# Patient Record
Sex: MALE | Race: WHITE | NOT HISPANIC OR LATINO | Employment: OTHER | URBAN - METROPOLITAN AREA
[De-identification: names, ages, dates, MRNs, and addresses within clinical notes are randomized per-mention and may not be internally consistent; named-entity substitution may affect disease eponyms.]

---

## 2018-04-26 ENCOUNTER — HOSPITAL ENCOUNTER (EMERGENCY)
Facility: HOSPITAL | Age: 63
Discharge: HOME | End: 2018-04-26
Attending: EMERGENCY MEDICINE | Admitting: EMERGENCY MEDICINE
Payer: COMMERCIAL

## 2018-04-26 ENCOUNTER — APPOINTMENT (EMERGENCY)
Dept: RADIOLOGY | Facility: HOSPITAL | Age: 63
End: 2018-04-26
Payer: COMMERCIAL

## 2018-04-26 VITALS
SYSTOLIC BLOOD PRESSURE: 124 MMHG | DIASTOLIC BLOOD PRESSURE: 67 MMHG | BODY MASS INDEX: 21.81 KG/M2 | TEMPERATURE: 98.4 F | WEIGHT: 152.34 LBS | OXYGEN SATURATION: 95 % | RESPIRATION RATE: 17 BRPM | HEIGHT: 70 IN | HEART RATE: 77 BPM

## 2018-04-26 DIAGNOSIS — S52.509A DISTAL RADIAL FRACTURE: ICD-10-CM

## 2018-04-26 DIAGNOSIS — M25.532 LEFT WRIST PAIN: Primary | ICD-10-CM

## 2018-04-26 PROCEDURE — 99283 EMERGENCY DEPT VISIT LOW MDM: CPT

## 2018-04-26 PROCEDURE — 73110 X-RAY EXAM OF WRIST: CPT

## 2018-04-26 RX ORDER — ACETAMINOPHEN 325 MG/1
975 TABLET ORAL ONCE
Status: DISCONTINUED | OUTPATIENT
Start: 2018-04-26 | End: 2018-04-26

## 2018-04-26 RX ORDER — NAPROXEN 500 MG/1
500 TABLET ORAL 2 TIMES DAILY PRN
Qty: 20 TABLET | Refills: 0 | Status: SHIPPED | OUTPATIENT
Start: 2018-04-26

## 2018-04-26 NOTE — DISCHARGE INSTRUCTIONS
Wrist Fracture in Adults   WHAT YOU NEED TO KNOW:   A wrist fracture is a break in one or more of the bones in your wrist    DISCHARGE INSTRUCTIONS:   Return to the emergency department if:   · Your pain gets worse or does not get better after you take pain medicine  · Your cast or splint breaks, gets wet, or is damaged  · Your hand or fingers feel numb or cold  · Your hand or fingers turn white or blue  · Your splint or cast feels too tight  · You have more pain or swelling after the cast or splint is put on  Contact your healthcare provider if:   · You have a fever  · There is a foul smell or blood coming from under the cast     · You have questions or concerns about your condition or care  Medicines:   · Prescription pain medicine  may be given  Ask your healthcare provider how to take this medicine safely  Some prescription pain medicines contain acetaminophen  Do not take other medicines that contain acetaminophen without talking to your healthcare provider  Too much acetaminophen may cause liver damage  Prescription pain medicine may cause constipation  Ask your healthcare provider how to prevent or treat constipation  · NSAIDs , such as ibuprofen, help decrease swelling, pain, and fever  NSAIDs can cause stomach bleeding or kidney problems in certain people  If you take blood thinner medicine, always ask your healthcare provider if NSAIDs are safe for you  Always read the medicine label and follow directions  · Acetaminophen  decreases pain and fever  It is available without a doctor's order  Ask how much to take and how often to take it  Follow directions  Read the labels of all other medicines you are using to see if they also contain acetaminophen, or ask your doctor or pharmacist  Acetaminophen can cause liver damage if not taken correctly  Do not use more than 4 grams (4,000 milligrams) total of acetaminophen in one day  · Take your medicine as directed    Contact your healthcare provider if you think your medicine is not helping or if you have side effects  Tell him or her if you are allergic to any medicine  Keep a list of the medicines, vitamins, and herbs you take  Include the amounts, and when and why you take them  Bring the list or the pill bottles to follow-up visits  Carry your medicine list with you in case of an emergency  Self-care:   · Rest  as much as possible  Do not play contact sports until the healthcare provider says it is okay  · Apply ice  on your wrist for 15 to 20 minutes every hour or as directed  Use an ice pack, or put crushed ice in a plastic bag  Cover it with a towel before you place it on your skin  Ice helps prevent tissue damage and decreases swelling and pain  · Elevate  your wrist above the level of your heart as often as possible  This will help decrease swelling and pain  Prop your wrist on pillows or blankets to keep it elevated comfortably  Cast or splint care:   · You may take a bath or shower as directed  Do not let your cast or splint get wet  Before bathing, cover the cast or splint with 2 plastic trash bags  Tape the bags to your skin above the cast or splint to seal out the water  Keep your arm out of the water in case the bag breaks  If a plaster cast gets wet and soft, call your healthcare provider  · Check the skin around the cast or splint every day  You may put lotion on any red or sore areas  · Do not push down or lean on the cast or brace because it may break  · Do not  scratch the skin under the cast by putting a sharp or pointed object inside the cast   Go to physical therapy as directed: You may need physical therapy after your wrist heals and the cast is removed  A physical therapist can teach you exercises to help improve movement and strength and to decrease pain  Follow up with your healthcare provider or bone specialist as directed: You may need to return to have your cast removed   You may also need an x-ray to check how well the bone has healed  Write down your questions so you remember to ask them during your visits  © 2017 [address] [employer] Information is for End User's use only and may not be sold, redistributed or otherwise used for commercial purposes  All illustrations and images included in CareNotes® are the copyrighted property of A D [employer] M [employer]  or [employer]  The above information is an  only  It is not intended as medical advice for individual conditions or treatments  Talk to your doctor, nurse or pharmacist before following any medical regimen to see if it is safe and effective for you  Splint Care   WHAT YOU NEED TO KNOW:   Splint care is important to help protect your splint until it comes off  Some splints are made of fiberglass or plaster that will need to dry and harden  Splint care will help the splint dry and harden correctly  Even after your splint hardens, it can be damaged  DISCHARGE INSTRUCTIONS:   Return to the emergency department if:   · You have increased pain  · Your fingers or toes are numb or tingling  · You feel burning or stinging around your injury  · Your nails, fingers, or toes turn pale, blue, or gray, and feel cold  · You have new or increased trouble moving your fingers or toes  · Your swelling gets worse  · The skin under your splint is bleeding or leaking pus  Contact your healthcare provider if:   · Your hard splint gets wet or is damaged  · You have a fever  · Your splint feels tighter  · You have itchy, dry skin under your splint that is getting worse  · The skin under your splint is red, or you have a new sore  · You notice a bad smell coming from your splint  · You have questions or concerns about your condition or care  How to care for your splint:   · Wait for your hard splint to harden completely    You may have to wait up to 3 days before you can walk on a plaster splint  · Check your splint and the skin around it each day  Check your splint for damage, such as cracks and breaks  Check your skin for redness, increased swelling, and sores  Loosen the elastic bandage around your splint if it feels too tight  · Keep your splint clean and dry  Keep dirt out of your splint  Before you bathe, wrap your hard splint with 2 layers of plastic  Then put a plastic bag over it  Keep the plastic bag tightly sealed  You can also ask your healthcare provider about waterproof shields  Do not put your hard splint in the water , even with a plastic bag over it  A wet splint can make your skin itchy, and may lead to infection  · Do not put powders or deodorants inside your splint  These can dry your skin and increase itching  · Do not try to scratch the skin inside your hard splint with sharp objects  Sharp objects can break off inside your splint or hurt your skin  · Do not pull the padding out of your splint  The padding inside your splint protects your skin  You may develop a sore on your skin if you take out the padding  Follow up with your healthcare provider as directed within 1 to 2 weeks:  Write down your questions so you remember to ask them during your visits  © 2017 [address] [employer] Information is for End User's use only and may not be sold, redistributed or otherwise used for commercial purposes  All illustrations and images included in CareNotes® are the copyrighted property of A D [employer] M [employer] Inc  or [employer] Jones  The above information is an  only  It is not intended as medical advice for individual conditions or treatments  Talk to your doctor, nurse or pharmacist before following any medical regimen to see if it is safe and effective for you

## 2018-04-26 NOTE — ED PROVIDER NOTES
History  Chief Complaint   Patient presents with    Wrist Injury     Pt presents to ED after injuring wrist after dog pulled while on leash  Some swelling noted  History of Present Illness   58 y o  male presents to the ED after his dog pulled on the leash, injuring his left wrist   Patient notes lateral pain at the base of the 1st digit  Patient denies striking his head and denies any loss of consciousness  Patient states the injury occurred yesterday (about 30 hours ago)  Patient currently complains of only left wrist pain  ROS: Patient denies any headache, abdominal pain, chest pain, other extremity pain, fever/chils, nausea/vomiting, visual changes, diarrhea, dyspnea, cough, arthralgia, dysuria  All other systems reviewed and are negative  PHYSICAL EXAM:   Constitutional: No acute distress  HENT: Normocephalic and atraumatic  Normal pharyngeal exam  No raccoon eyes or Abad sign  Eyes: No hyphema  PERRL  Neck: No midline tenderness, supple  CV: Regular rate  Respiratory: Normal effort  Abdomen: non-distended  Back: No vertebral tenderness  Skin: Normal color, warm and dry   Extremities (other than left wrist): Non-tender, no deformities  Left wrist:   Contusion noted at the posterior aspect of the 1st digit  There is tenderness to palpation in this area  Neuro: Awake, alert, no gross sensory or motor deficits     Medical Decision Making     Left wrist pain with a broad differential   Considering patient's potential traumatic injury, will obtain plain film imaging to evaluate for potential fracture  Patient has declined analgesia, he took naproxen prior to coming to the emergency room and states that his pain has substantially improved since then  I discussed continued analgesia with the naproxen and following with Orthopedics        X-ray imaging was concerning for potential dislocation of the ulna however I discussed with Radiology feels more likely the angle of film as it is only in 1 view  Concerns for potential nondisplaced distal radius fracture  Patient placed in volar splint by technician, post check neurovascular exam is  Intact as completed by myself  I discussed again follow-up with Orthopedics, splint care, symptomatic management and return precautions  Wrist Injury - Major   Location:  Wrist  Wrist location:  L wrist  Injury: yes    Time since incident:  1 day  Pain details:     Radiates to:  Does not radiate    Severity:  Moderate    Onset quality:  Gradual    Duration:  1 day    Timing:  Constant    Progression:  Worsening  Handedness:  Right-handed  Prior injury to area:  Yes  Relieved by:  NSAIDs  Worsened by:  Nothing  Ineffective treatments:  None tried  Associated symptoms: decreased range of motion    Associated symptoms: no back pain, no fatigue, no fever, no muscle weakness, no neck pain, no numbness, no stiffness, no swelling and no tingling        None       History reviewed  No pertinent past medical history  History reviewed  No pertinent surgical history  History reviewed  No pertinent family history  I have reviewed and agree with the history as documented  Social History   Substance Use Topics    Smoking status: Current Every Day Smoker     Packs/day: 1 00    Smokeless tobacco: Never Used    Alcohol use No        Review of Systems   Constitutional: Negative for fatigue and fever  Musculoskeletal: Negative for back pain, neck pain and stiffness  All other systems reviewed and are negative        Physical Exam  ED Triage Vitals [04/26/18 0223]   Temperature Pulse Respirations Blood Pressure SpO2   98 4 °F (36 9 °C) 77 17 124/67 95 %      Temp Source Heart Rate Source Patient Position - Orthostatic VS BP Location FiO2 (%)   Oral Monitor Sitting Right arm --      Pain Score       5           Orthostatic Vital Signs  Vitals:    04/26/18 0223   BP: 124/67   Pulse: 77   Patient Position - Orthostatic VS: Sitting       Physical Exam    ED Medications  Medications - No data to display    Diagnostic Studies  Results Reviewed     None                 XR wrist 3+ views LEFT   ED Interpretation by Nickie Moreau MD (04/26 3723)   Abnormal   Isolated dislocation of DRUJ? Final Result by Helena Weeks DO (04/26 9875)      Questionable nondisplaced fracture of the distal radius  Workstation performed: [id number]                    Procedures  Procedures       Phone Contacts  ED Phone Contact    ED Course                               MDM  CritCare Time    Disposition  Final diagnoses:   Left wrist pain   Distal radial fracture     Time reflects when diagnosis was documented in both MDM as applicable and the Disposition within this note     Time User Action Codes Description Comment    4/26/2018  3:41 AM Ino Maxon Add [M25 532] Left wrist pain     4/26/2018  3:41 AM Ino Maxon Add [L66 028L] Distal radial fracture     4/26/2018  3:41 AM Ino Maxon Remove [U33 601G] Distal radial fracture     4/26/2018  3:42 AM Ino Maxon Add [K19 188J] Distal radial fracture       ED Disposition     ED Disposition Condition Comment    Discharge  Jose Mendoza discharge to home/self care  Condition at discharge: Stable        Follow-up Information     Follow up With Specialties Details Why [address] Orthopedic Surgery Schedule an appointment as soon as possible for a visit in 1 week   Follow-up on left wrist fracture  [address] [facility] [address]  [phone]        Patient's Medications   Discharge Prescriptions    NAPROXEN (NAPROSYN) 500 MG TABLET    Take 1 tablet (500 mg total) by mouth 2 (two) times a day as needed for mild pain (take with food) for up to 20 doses       Start Date: 4/26/2018 End Date: --       Order Dose: 500 mg       Quantity: 20 tablet    Refills: 0     No discharge procedures on file      ED Provider  Electronically Signed by           Tiki Connors MD  04/26/18 0777

## 2018-05-01 NOTE — TELEPHONE ENCOUNTER
I called patient to schedule appt for L wrist fx and he stated that he was good he no longer needed an appt

## 2018-05-27 ENCOUNTER — APPOINTMENT (EMERGENCY)
Dept: RADIOLOGY | Facility: HOSPITAL | Age: 63
End: 2018-05-27
Payer: COMMERCIAL

## 2018-05-27 ENCOUNTER — HOSPITAL ENCOUNTER (EMERGENCY)
Facility: HOSPITAL | Age: 63
Discharge: HOME | End: 2018-05-27
Payer: COMMERCIAL

## 2018-05-27 VITALS
DIASTOLIC BLOOD PRESSURE: 74 MMHG | TEMPERATURE: 98.3 F | WEIGHT: 165 LBS | HEART RATE: 70 BPM | OXYGEN SATURATION: 99 % | HEIGHT: 71 IN | BODY MASS INDEX: 23.1 KG/M2 | SYSTOLIC BLOOD PRESSURE: 146 MMHG | RESPIRATION RATE: 20 BRPM

## 2018-05-27 DIAGNOSIS — S52.209A ULNAR FRACTURE: ICD-10-CM

## 2018-05-27 DIAGNOSIS — S41.151A DOG BITE OF RIGHT ARM, INITIAL ENCOUNTER: Primary | ICD-10-CM
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
## 2018-05-27 PROCEDURE — 90471 IMMUNIZATION ADMIN: CPT

## 2018-05-27 PROCEDURE — 73110 X-RAY EXAM OF WRIST: CPT

## 2018-05-27 PROCEDURE — 99283 EMERGENCY DEPT VISIT LOW MDM: CPT

## 2018-05-27 PROCEDURE — 90715 TDAP VACCINE 7 YRS/> IM: CPT | Performed by: PHYSICIAN ASSISTANT

## 2018-05-27 PROCEDURE — 96365 THER/PROPH/DIAG IV INF INIT: CPT

## 2018-05-27 RX ORDER — OXYCODONE HYDROCHLORIDE 5 MG/1
5 TABLET ORAL EVERY 4 HOURS PRN
Qty: 5 TABLET | Refills: 0 | Status: SHIPPED | OUTPATIENT
Start: 2018-05-27

## 2018-05-27 RX ORDER — AMOXICILLIN AND CLAVULANATE POTASSIUM 875; 125 MG/1; MG/1
1 TABLET, FILM COATED ORAL EVERY 12 HOURS SCHEDULED
Qty: 14 TABLET | Refills: 0 | Status: SHIPPED | OUTPATIENT
Start: 2018-05-27 | End: 2018-06-03

## 2018-05-27 RX ADMIN — CEFAZOLIN SODIUM 2000 MG: 2 SOLUTION INTRAVENOUS at 16:29

## 2018-05-27 RX ADMIN — TETANUS TOXOID, REDUCED DIPHTHERIA TOXOID AND ACELLULAR PERTUSSIS VACCINE, ADSORBED 0.5 ML: 5; 2.5; 8; 8; 2.5 SUSPENSION INTRAMUSCULAR at 14:49

## 2018-05-27 NOTE — ED PROVIDER NOTES
History  Chief Complaint   Patient presents with    Dog Bite     pt presents with a dog bite on right arm  Rajani Alicia is a 58 y o  male w PMH none significant who presents for evaluation of dog bite  Patient presents with his wife  He was bit by a dog today  Bit on the right arm  It was the patient's and off you recently underwent procedure at the vet  The patient's wife is trying to dress the wound and the patient was trying to assist with this when the dog got scared, was in pain with the wound care and subsequently but the patient  His tetanus shot is not up-to-date  He has no numbness or paresthesias of the arm  He noticed swelling to the under aspect of the upper arm  No AC / AP  Prior to Admission Medications   Prescriptions Last Dose Informant Patient Reported? Taking?   naproxen (NAPROSYN) 500 mg tablet   No No   Sig: Take 1 tablet (500 mg total) by mouth 2 (two) times a day as needed for mild pain (take with food) for up to 20 doses      Facility-Administered Medications: None       History reviewed  No pertinent past medical history  History reviewed  No pertinent surgical history  History reviewed  No pertinent family history  I have reviewed and agree with the history as documented  Social History   Substance Use Topics    Smoking status: Current Every Day Smoker     Packs/day: 1 00    Smokeless tobacco: Never Used    Alcohol use No      Comment: occ        Review of Systems   Constitutional: Negative for activity change, chills, diaphoresis, fatigue and fever  HENT: Negative for congestion and rhinorrhea  Eyes: Negative for pain  Respiratory: Negative for cough, chest tightness, shortness of breath and wheezing  Cardiovascular: Negative for chest pain and palpitations  Gastrointestinal: Negative for abdominal distention, constipation, diarrhea, nausea and vomiting  Genitourinary: Negative for difficulty urinating and dysuria  Musculoskeletal: Negative for arthralgias and myalgias  Skin: Positive for wound  Neurological: Negative for dizziness, weakness, light-headedness and headaches  Psychiatric/Behavioral: The patient is not nervous/anxious  Physical Exam  Physical Exam   Constitutional: He is oriented to person, place, and time  He appears well-developed and well-nourished  No distress  HENT:   Head: Normocephalic and atraumatic  Eyes: Pupils are equal, round, and reactive to light  Neck: Neck supple  No tracheal deviation present  Cardiovascular: Normal rate, regular rhythm and intact distal pulses  Exam reveals no gallop and no friction rub  No murmur heard  Pulmonary/Chest: Effort normal and breath sounds normal  No respiratory distress  He has no wheezes  He has no rales  Abdominal: Soft  Bowel sounds are normal  He exhibits no distension and no mass  There is no tenderness  There is no guarding  Musculoskeletal: He exhibits no edema or deformity  Neurological: He is alert and oriented to person, place, and time  Skin: Skin is warm and dry  He is not diaphoretic  Multiple punctate puncture wounds to the right arm  There is an area of hematoma to the right upper arm but the compartments are soft  Distal motor nerve function and motor sensation is intact and strength is 5/5 with normal cap refill in distal pulses  Only slight oozing from wounds  Psychiatric: He has a normal mood and affect  His behavior is normal    Nursing note and vitals reviewed        Vital Signs  ED Triage Vitals [05/27/18 1338]   Temperature Pulse Respirations Blood Pressure SpO2   98 3 °F (36 8 °C) 70 20 146/74 99 %      Temp Source Heart Rate Source Patient Position - Orthostatic VS BP Location FiO2 (%)   Oral Monitor Sitting Left arm --      Pain Score       6           Vitals:    05/27/18 1338   BP: 146/74   Pulse: 70   Patient Position - Orthostatic VS: Sitting       Visual Acuity      ED Medications  Medications ceFAZolin (ANCEF) IVPB (premix) 2,000 mg (not administered)   tetanus-diphtheria-acellular pertussis (BOOSTRIX) IM injection 0 5 mL (0 5 mL Intramuscular Given 5/27/18 1449)       Diagnostic Studies  Results Reviewed     None                 XR wrist 3+ views RIGHT   Final Result by Audie Freeman MD (05/27 8323)      Ulnar fracture  Puncture wounds  Soft tissue swelling            Workstation performed: [id number]                    Procedures  Procedures       Phone Contacts  ED Phone Contact    ED Course  ED Course as of May 27 1626   Sun May 27, 2018   1624 Ulnar gutter splint was placed by ED tech Sarah Rack  Examined by me post splint placement  Splint is in good position and neurovascular exam intact after splint placement  1625 After fx detected on xr did give dose of ancef IV here  MDM  Number of Diagnoses or Management Options  Dog bite of right arm, initial encounter:   Ulnar fracture:   Diagnosis management comments: DDX includes but not ltd to:   Dog bite   All punctate lacs, no lacerations which necessitate suture repair   Does not need rabies series - can monitor dog throughout the week but it seems 2/2 to dogs anxiety w wound care    Plan is to obtain:  XR of affected joint(s) for acute osseous abnormality   Animal bite paperwork  Tetanus   Wound cleansing  augmentin     Based on results:  fx of distal ulna  Cleaned wounds copiously w normal sterile saline and hydrogen peroxide  The pucnture wounds were bandaged w surgifoam for some continued oozing and then vaseline gauze, kerlix and splint placed over this  Pain controlled here and offered meds but denied need for them  Return parameters discussed  Pt requires f/u as an outpt  Pt expresses understanding w above treatment plan  All questions answered prior to d/c      Portions of the record may have been created with voice recognition software   Occasional wrong word or "sound a like" substitutions may have occurred due to the inherent limitations of voice recognition software   Read the chart carefully and recognize, using context, where substitutions have occurred  Return parameters provided regarding infecetion / compartment syndrome development        CritCare Time    Disposition  Final diagnoses:   Dog bite of right arm, initial encounter   Ulnar fracture     Time reflects when diagnosis was documented in both MDM as applicable and the Disposition within this note     Time User Action Codes Description Comment    5/27/2018  2:29 PM Genevie Angel Luis Add [employer]  0XXA] Dog bite of right arm, initial encounter     5/27/2018  4:18 PM Genevie Angel Luis Add [S52 209A] Ulnar fracture       ED Disposition     ED Disposition Condition Comment    Discharge  Doherty Mersmith discharge to home/self care      Condition at discharge: Good        Follow-up Information     Follow up With Specialties Details Why Contact Info Additional Information    [address] [facility] Emergency Medicine  If symptoms worsen [address] [facility]  [phone] [place] ED, [address] [place] [address], [address] [facility] Orthopedic Surgery Call in 1 day  [address] [place] [address] Khadijah Baltazar [address]  [phone]           Patient's Medications   Discharge Prescriptions    AMOXICILLIN-CLAVULANATE (AUGMENTIN) 875-125 MG PER TABLET    Take 1 tablet by mouth every 12 (twelve) hours for 7 days       Start Date: 5/27/2018 End Date: 6/3/2018       Order Dose: 1 tablet       Quantity: 14 tablet    Refills: 0    OXYCODONE (ROXICODONE) 5 MG IMMEDIATE RELEASE TABLET    Take 1 tablet (5 mg total) by mouth every 4 (four) hours as needed for moderate pain for up to 5 doses Max Daily Amount: 30 mg       Start Date: 5/27/2018 End Date: --       Order Dose: 5 mg       Quantity: 5 tablet    Refills: 0     No discharge procedures on file      ED Provider  Electronically Signed by           Sridhar Alanis PA-C  05/27/18 3826

## 2018-05-27 NOTE — DISCHARGE INSTRUCTIONS
Animal Bite   WHAT YOU NEED TO KNOW:   Animal bite injuries range from shallow cuts to deep, life-threatening wounds  An animal can cut or puncture the skin when it bites  Your skin may be torn from your body  Your skin may swell or bruise even if the bite does not break the skin  Animal bites occur more often on the hands, arms, legs, and face  Bites from dogs and cats are the most common injuries  DISCHARGE INSTRUCTIONS:   Return to the emergency department if:   · You have a fever  · Your wound is red, swollen, and draining pus  · You see red streaks on the skin around the wound  · You can no longer move the bitten area  · Your heartbeat and breathing are much faster than usual     · You feel dizzy and confused  Contact your healthcare provider if:   · Your pain does not get better, even after you take pain medicine  · You have nightmares or flashbacks about the animal bite  · You have questions or concerns about your condition or care  Medicines: You may need any of the following:  · Antibiotics  prevent or treat a bacterial infection  · Prescription pain medicine  may be given  Ask how to take this medicine safely  · A tetanus vaccine  may be needed to prevent tetanus  Tetanus is a life-threatening bacterial infection that affects the nerves and muscles  The bacteria can be spread through animal bites  · A rabies vaccine  may be needed to prevent rabies  Rabies is a life-threatening viral infection  The virus can be spread through animal bites  · Take your medicine as directed  Contact your healthcare provider if you think your medicine is not helping or if you have side effects  Tell him of her if you are allergic to any medicine  Keep a list of the medicines, vitamins, and herbs you take  Include the amounts, and when and why you take them  Bring the list or the pill bottles to follow-up visits  Carry your medicine list with you in case of an emergency    Follow up with your healthcare provider in 1 to 2 days: You may need to return to have your stitches removed  Write down your questions so you remember to ask them during your visits  Self-care:   · Apply antibiotic ointment as directed  This helps prevent infection in minor skin wounds  It is available without a doctor's order  · Keep the wound clean and covered  Wash the wound every day with soap and water or germ-killing cleanser  Ask your healthcare provider about the kinds of bandages to use  · Apply ice on your wound  Ice helps decrease swelling and pain  Ice may also help prevent tissue damage  Use an ice pack, or put crushed ice in a plastic bag  Cover it with a towel and place it on your wound for 15 to 20 minutes every hour or as directed  · Elevate the wound area  Raise your wound above the level of your heart as often as you can  This will help decrease swelling and pain  Prop your wound on pillows or blankets to keep it elevated comfortably  Prevent another animal bite:   · Learn to recognize the signs of a scared or angry pet  Avoid quick, sudden movements  · Do not step between animals that are fighting  · Do not leave a pet alone with a young child  · Do not disturb an animal while it eats, sleeps, or cares for its young  · Do not approach an animal you do not know, especially one that is tied up or caged  · Stay away from animals that seem sick or act strangely  · Do not feed or capture wild animals  © 2017 [address] Information is for End User's use only and may not be sold, redistributed or otherwise used for commercial purposes  All illustrations and images included in CareNotes® are the copyrighted property of [employer] , [employer] properties and services  or Jean Pierre Jones  The above information is an  only  It is not intended as medical advice for individual conditions or treatments   Talk to your doctor, nurse or pharmacist before following any medical regimen to see if it is safe and effective for you

## 2020-03-11 ENCOUNTER — APPOINTMENT (OUTPATIENT)
Dept: LAB | Facility: HOSPITAL | Age: 65
End: 2020-03-11
Attending: INTERNAL MEDICINE
Payer: COMMERCIAL

## 2020-03-11 ENCOUNTER — TRANSCRIBE ORDERS (OUTPATIENT)
Dept: ADMINISTRATIVE | Facility: HOSPITAL | Age: 65
End: 2020-03-11

## 2020-03-11 ENCOUNTER — HOSPITAL ENCOUNTER (OUTPATIENT)
Dept: RADIOLOGY | Facility: HOSPITAL | Age: 65
Discharge: HOME | End: 2020-03-11
Attending: INTERNAL MEDICINE
Payer: COMMERCIAL

## 2020-03-11 DIAGNOSIS — E55.9 VITAMIN D DEFICIENCY: ICD-10-CM

## 2020-03-11 DIAGNOSIS — Z91.89 CARDIOVASCULAR RISK FACTOR: ICD-10-CM

## 2020-03-11 DIAGNOSIS — E11.9 DIABETES MELLITUS WITHOUT COMPLICATION (HCC): ICD-10-CM

## 2020-03-11 DIAGNOSIS — R09.89 ABNORMAL CHEST SOUNDS: ICD-10-CM

## 2020-03-11 DIAGNOSIS — E78.2 MIXED HYPERLIPIDEMIA: ICD-10-CM

## 2020-03-11 DIAGNOSIS — Z11.59 SPECIAL SCREENING EXAMINATION FOR UNSPECIFIED VIRAL DISEASE: ICD-10-CM

## 2020-03-11 DIAGNOSIS — E78.2 MIXED HYPERLIPIDEMIA: Primary | ICD-10-CM

## 2020-03-11 DIAGNOSIS — Z11.59 NEED FOR HEPATITIS C SCREENING TEST: Primary | ICD-10-CM

## 2020-03-11 DIAGNOSIS — D64.9 ANEMIA, UNSPECIFIED TYPE: ICD-10-CM

## 2020-03-11 LAB
25(OH)D3 SERPL-MCNC: 19.7 NG/ML (ref 30–100)
ALBUMIN SERPL BCP-MCNC: 3.5 G/DL (ref 3.5–5)
ALP SERPL-CCNC: 89 U/L (ref 46–116)
ALT SERPL W P-5'-P-CCNC: 17 U/L (ref 12–78)
ANION GAP SERPL CALCULATED.3IONS-SCNC: 3 MMOL/L (ref 4–13)
AST SERPL W P-5'-P-CCNC: 10 U/L (ref 5–45)
ATRIAL RATE: 73 BPM
BILIRUB SERPL-MCNC: 0.5 MG/DL (ref 0.2–1)
BUN SERPL-MCNC: 16 MG/DL (ref 5–25)
CALCIUM SERPL-MCNC: 8.8 MG/DL (ref 8.3–10.1)
CHLORIDE SERPL-SCNC: 105 MMOL/L (ref 100–108)
CHOLEST SERPL-MCNC: 149 MG/DL (ref 50–200)
CO2 SERPL-SCNC: 32 MMOL/L (ref 21–32)
CREAT SERPL-MCNC: 0.82 MG/DL (ref 0.6–1.3)
ERYTHROCYTE [DISTWIDTH] IN BLOOD BY AUTOMATED COUNT: 13.7 % (ref 11.6–15.1)
EST. AVERAGE GLUCOSE BLD GHB EST-MCNC: 117 MG/DL
GFR SERPL CREATININE-BSD FRML MDRD: 93 ML/MIN/1.73SQ M
GLUCOSE P FAST SERPL-MCNC: 90 MG/DL (ref 65–99)
HBA1C MFR BLD: 5.7 %
HCT VFR BLD AUTO: 50.7 % (ref 36.5–49.3)
HCV AB SER QL: NORMAL
HDLC SERPL-MCNC: 45 MG/DL
HGB BLD-MCNC: 16.4 G/DL (ref 12–17)
LDLC SERPL CALC-MCNC: 93 MG/DL (ref 0–100)
MCH RBC QN AUTO: 30.8 PG (ref 26.8–34.3)
MCHC RBC AUTO-ENTMCNC: 32.3 G/DL (ref 31.4–37.4)
MCV RBC AUTO: 95 FL (ref 82–98)
NONHDLC SERPL-MCNC: 104 MG/DL
P AXIS: 86 DEGREES
PLATELET # BLD AUTO: 321 THOUSANDS/UL (ref 149–390)
PMV BLD AUTO: 10.5 FL (ref 8.9–12.7)
POTASSIUM SERPL-SCNC: 4.5 MMOL/L (ref 3.5–5.3)
PR INTERVAL: 164 MS
PROT SERPL-MCNC: 7.1 G/DL (ref 6.4–8.2)
QRS AXIS: -85 DEGREES
QRSD INTERVAL: 112 MS
QT INTERVAL: 400 MS
QTC INTERVAL: 440 MS
RBC # BLD AUTO: 5.32 MILLION/UL (ref 3.88–5.62)
SODIUM SERPL-SCNC: 140 MMOL/L (ref 136–145)
T WAVE AXIS: 35 DEGREES
TRIGL SERPL-MCNC: 55 MG/DL
VENTRICULAR RATE: 73 BPM
WBC # BLD AUTO: 12.29 THOUSAND/UL (ref 4.31–10.16)

## 2020-03-11 PROCEDURE — 80053 COMPREHEN METABOLIC PANEL: CPT

## 2020-03-11 PROCEDURE — 36415 COLL VENOUS BLD VENIPUNCTURE: CPT

## 2020-03-11 PROCEDURE — 93010 ELECTROCARDIOGRAM REPORT: CPT | Performed by: INTERNAL MEDICINE

## 2020-03-11 PROCEDURE — 93005 ELECTROCARDIOGRAM TRACING: CPT

## 2020-03-11 PROCEDURE — 85027 COMPLETE CBC AUTOMATED: CPT

## 2020-03-11 PROCEDURE — 82306 VITAMIN D 25 HYDROXY: CPT

## 2020-03-11 PROCEDURE — 86803 HEPATITIS C AB TEST: CPT

## 2020-03-11 PROCEDURE — 71046 X-RAY EXAM CHEST 2 VIEWS: CPT

## 2020-03-11 PROCEDURE — 83036 HEMOGLOBIN GLYCOSYLATED A1C: CPT

## 2020-03-11 PROCEDURE — 80061 LIPID PANEL: CPT

## 2021-01-13 ENCOUNTER — TRANSCRIBE ORDERS (OUTPATIENT)
Dept: ADMINISTRATIVE | Facility: HOSPITAL | Age: 66
End: 2021-01-13

## 2021-01-13 DIAGNOSIS — R41.82 ALTERED MENTAL STATUS, UNSPECIFIED ALTERED MENTAL STATUS TYPE: Primary | ICD-10-CM

## 2021-01-27 ENCOUNTER — TRANSCRIBE ORDERS (OUTPATIENT)
Dept: ADMINISTRATIVE | Facility: HOSPITAL | Age: 66
End: 2021-01-27

## 2021-01-27 ENCOUNTER — LAB (OUTPATIENT)
Dept: LAB | Facility: HOSPITAL | Age: 66
End: 2021-01-27
Attending: INTERNAL MEDICINE
Payer: COMMERCIAL

## 2021-01-27 DIAGNOSIS — E03.9 HYPOTHYROIDISM, ADULT: ICD-10-CM

## 2021-01-27 DIAGNOSIS — E11.9 DIABETES MELLITUS WITHOUT COMPLICATION (HCC): ICD-10-CM

## 2021-01-27 DIAGNOSIS — R41.3 MEMORY LOSS: ICD-10-CM

## 2021-01-27 DIAGNOSIS — D64.9 ANEMIA, UNSPECIFIED TYPE: ICD-10-CM

## 2021-01-27 DIAGNOSIS — E11.9 DIABETES MELLITUS WITHOUT COMPLICATION (HCC): Primary | ICD-10-CM

## 2021-01-27 LAB
ALBUMIN SERPL BCP-MCNC: 3.6 G/DL (ref 3.5–5)
ALP SERPL-CCNC: 95 U/L (ref 46–116)
ALT SERPL W P-5'-P-CCNC: 28 U/L (ref 12–78)
AMMONIA PLAS-SCNC: 43 UMOL/L (ref 11–35)
ANION GAP SERPL CALCULATED.3IONS-SCNC: 4 MMOL/L (ref 4–13)
AST SERPL W P-5'-P-CCNC: 16 U/L (ref 5–45)
BILIRUB SERPL-MCNC: 0.7 MG/DL (ref 0.2–1)
BUN SERPL-MCNC: 15 MG/DL (ref 5–25)
CALCIUM SERPL-MCNC: 9 MG/DL (ref 8.3–10.1)
CHLORIDE SERPL-SCNC: 102 MMOL/L (ref 100–108)
CO2 SERPL-SCNC: 34 MMOL/L (ref 21–32)
CREAT SERPL-MCNC: 0.9 MG/DL (ref 0.6–1.3)
ERYTHROCYTE [DISTWIDTH] IN BLOOD BY AUTOMATED COUNT: 13.9 % (ref 11.6–15.1)
EST. AVERAGE GLUCOSE BLD GHB EST-MCNC: 117 MG/DL
GFR SERPL CREATININE-BSD FRML MDRD: 89 ML/MIN/1.73SQ M
GLUCOSE P FAST SERPL-MCNC: 104 MG/DL (ref 65–99)
HBA1C MFR BLD: 5.7 %
HCT VFR BLD AUTO: 50.1 % (ref 36.5–49.3)
HGB BLD-MCNC: 16.1 G/DL (ref 12–17)
MCH RBC QN AUTO: 29.9 PG (ref 26.8–34.3)
MCHC RBC AUTO-ENTMCNC: 32.1 G/DL (ref 31.4–37.4)
MCV RBC AUTO: 93 FL (ref 82–98)
PLATELET # BLD AUTO: 323 THOUSANDS/UL (ref 149–390)
PMV BLD AUTO: 10.1 FL (ref 8.9–12.7)
POTASSIUM SERPL-SCNC: 4.4 MMOL/L (ref 3.5–5.3)
PROT SERPL-MCNC: 7.5 G/DL (ref 6.4–8.2)
RBC # BLD AUTO: 5.39 MILLION/UL (ref 3.88–5.62)
RPR SER QL: NORMAL
SODIUM SERPL-SCNC: 140 MMOL/L (ref 136–145)
TSH SERPL DL<=0.05 MIU/L-ACNC: 1.81 UIU/ML (ref 0.36–3.74)
VIT B12 SERPL-MCNC: 403 PG/ML (ref 100–900)
WBC # BLD AUTO: 9.28 THOUSAND/UL (ref 4.31–10.16)

## 2021-01-27 PROCEDURE — 86618 LYME DISEASE ANTIBODY: CPT

## 2021-01-27 PROCEDURE — 84443 ASSAY THYROID STIM HORMONE: CPT

## 2021-01-27 PROCEDURE — 85027 COMPLETE CBC AUTOMATED: CPT

## 2021-01-27 PROCEDURE — 86592 SYPHILIS TEST NON-TREP QUAL: CPT

## 2021-01-27 PROCEDURE — 86617 LYME DISEASE ANTIBODY: CPT

## 2021-01-27 PROCEDURE — 80053 COMPREHEN METABOLIC PANEL: CPT

## 2021-01-27 PROCEDURE — 36415 COLL VENOUS BLD VENIPUNCTURE: CPT

## 2021-01-27 PROCEDURE — 82140 ASSAY OF AMMONIA: CPT

## 2021-01-27 PROCEDURE — 83036 HEMOGLOBIN GLYCOSYLATED A1C: CPT

## 2021-01-27 PROCEDURE — 82607 VITAMIN B-12: CPT

## 2021-01-28 LAB — B BURGDOR IGG+IGM SER-ACNC: 224

## 2021-01-29 LAB

## 2021-02-11 ENCOUNTER — HOSPITAL ENCOUNTER (OUTPATIENT)
Dept: MRI IMAGING | Facility: HOSPITAL | Age: 66
Discharge: HOME | End: 2021-02-11
Attending: INTERNAL MEDICINE
Payer: COMMERCIAL

## 2021-02-11 DIAGNOSIS — R41.82 ALTERED MENTAL STATUS, UNSPECIFIED ALTERED MENTAL STATUS TYPE: ICD-10-CM

## 2021-02-11 PROCEDURE — G1004 CDSM NDSC: HCPCS

## 2021-02-11 PROCEDURE — 70553 MRI BRAIN STEM W/O & W/DYE: CPT

## 2021-02-11 PROCEDURE — A9585 GADOBUTROL INJECTION: HCPCS | Performed by: RADIOLOGY

## 2021-02-11 RX ADMIN — GADOBUTROL 7 ML: 604.72 INJECTION INTRAVENOUS at 08:11

## 2021-02-25 ENCOUNTER — TELEPHONE (OUTPATIENT)
Dept: NEUROLOGY | Facility: CLINIC | Age: 66
End: 2021-02-25

## 2021-02-25 NOTE — TELEPHONE ENCOUNTER
Best contact number for patient:  Carlota Diop at [phone]  Not on comm cons    Emergency Contact name and number:  same    Referring provider and telephone number:  Dr Angie Gregorio     Primary Care Provider Name and if affiliated with [facility]'s:   Same - not  - private    Reason for Appointment/Dx:  Abn MRI and Degenerative changes    Have you seen and followed up with a pediatric Neurologist for this disease in the past?    n/a   (If yes ok to schedule with Dr Kacy Faria)    Neurology Location patient would like to be seen:  [place] [facility] [place]    Order received? Yes - ref in media from 2/15                                                Records Received? NO    Have you ever seen another Neurologist?      Not sure    Insurance Information    Insurance Name:     Isaura Crenshaw W Cheikh Luz PPO    ID/Policy #:    Secondary Insurance:    ID/Policy#: Workman's Comp/ Accident/ School  Information      Workman's Comp/Accident/School related?          NO    If yes name of Insurance company:    Date of Injury:    Type of Injury:    [address] Name and Telephone Number:    Notes:                   Appointment date:     Sched for   Monday 4/26/21  1p  Dr Ruth Fisher NP packet    Put on [facility] for all 3 [place] rosalba

## 2021-03-02 ENCOUNTER — CONSULT (OUTPATIENT)
Dept: NEUROLOGY | Facility: CLINIC | Age: 66
End: 2021-03-02
Payer: COMMERCIAL

## 2021-03-02 VITALS
DIASTOLIC BLOOD PRESSURE: 60 MMHG | HEIGHT: 70 IN | SYSTOLIC BLOOD PRESSURE: 112 MMHG | BODY MASS INDEX: 23.79 KG/M2 | HEART RATE: 67 BPM | WEIGHT: 166.2 LBS

## 2021-03-02 DIAGNOSIS — F02.80 EARLY ONSET ALZHEIMER'S DEMENTIA WITHOUT BEHAVIORAL DISTURBANCE (HCC): ICD-10-CM

## 2021-03-02 DIAGNOSIS — G30.0 EARLY ONSET ALZHEIMER'S DEMENTIA WITHOUT BEHAVIORAL DISTURBANCE (HCC): ICD-10-CM

## 2021-03-02 DIAGNOSIS — R90.89 ABNORMAL BRAIN MRI: Primary | ICD-10-CM

## 2021-03-02 PROCEDURE — 99204 OFFICE O/P NEW MOD 45 MIN: CPT | Performed by: PSYCHIATRY & NEUROLOGY

## 2021-03-02 RX ORDER — DONEPEZIL HYDROCHLORIDE 10 MG/1
10 TABLET, FILM COATED ORAL
Qty: 30 TABLET | Refills: 5 | Status: SHIPPED | OUTPATIENT
Start: 2021-03-02 | End: 2021-07-06 | Stop reason: SDUPTHER

## 2021-03-02 RX ORDER — DONEPEZIL HYDROCHLORIDE 5 MG/1
5 TABLET, FILM COATED ORAL DAILY
COMMUNITY
Start: 2021-02-15 | End: 2021-07-06 | Stop reason: ALTCHOICE

## 2021-03-02 NOTE — PROGRESS NOTES
Ceci Kern is a 76 y o  male  Chief Complaint   Patient presents with    Advice Only     Abnormal MRI, degenerative changes       Assessment:  1  Abnormal brain MRI    2  Early onset Alzheimer's dementia without behavioral disturbance ([address] )        Plan:   increase Aricept to 10 mg once a day after finishing Aricept 5 mg once a day for 1 month    continue with mentally stimulating exercises  to be under constant supervision  neuropsych testing  could refer patient for a 2nd opinion to [facility]  follow-up in 2 months    Discussion:   differential diagnosis discussed with the patient and his wife MRI findings were discussed with them,  His MRI of the brain was reported as volume loss slightly greater than would be expected for age and prominent brain iron accumulation suggestive of neuro degenerative processes, he does not have any signs and symptoms of Parkinson's disease or MS, possibilities include early Alzheimer's dementia, he also had family history of dementia and his dad in his early 76s, I did discuss with family that we should have him go for a detailed neuropsych testing his recent blood work was unremarkable, he could go for a 2nd opinion to [facility] would like to hold off on that for now, he has been started on Aricept 5 mg once a day by his family physician 2 weeks back I have advised him to take that for a month and then a if he is tolerating it to increase it to 10 mg a day, he may need to go on Namenda also in the future, he was advised to be under constant supervision to take fall and safety precautions, to keep blood pressure cholesterol and sugar under control, to go to the hospital if has any worsening symptoms and call me otherwise to see me back in 2months and follow up with the other physicians      Subjective:    HPI    patient is here for evaluation of memory difficulties, he is accompanied with his wife,  According to her he has been having difficulty with memory for the last 1 year initially he noticed that he was having issues with his driving and then with short-term memory issues, denies having any headaches there is no dizziness no vision or speech difficulty, no focal weakness, he is able to do his ADLs his wife takes care of the finances and he is not driving, his mood is good, sleep is good, no focal weakness, appetite is good weight has been stable, he was recently started on Aricept 5 mg once a day 2 weeks back and is tolerating it well, his dad had dementia and his late 62s and early 76s, no tremor, no freezing episodes, no other complaints  Vitals:    03/02/21 1323   BP: 112/60   BP Location: Left arm   Patient Position: Sitting   Cuff Size: Standard   Pulse: 67   Weight: 75 4 kg (166 lb 3 2 oz)   Height: 5' 10" (1 778 m)       Current Medications    Current Outpatient Medications:     donepezil (ARICEPT) 5 mg tablet, Take 5 mg by mouth daily, Disp: , Rfl:     naproxen (NAPROSYN) 500 mg tablet, Take 1 tablet (500 mg total) by mouth 2 (two) times a day as needed for mild pain (take with food) for up to 20 doses, Disp: 20 tablet, Rfl: 0    oxyCODONE (ROXICODONE) 5 mg immediate release tablet, Take 1 tablet (5 mg total) by mouth every 4 (four) hours as needed for moderate pain for up to 5 doses Max Daily Amount: 30 mg, Disp: 5 tablet, Rfl: 0      Allergies  Patient has no known allergies  Past Medical History  No past medical history on file  Past Surgical History:  No past surgical history on file  Family History:  No family history on file  Social History:   reports that he has been smoking  He has been smoking about 1 00 pack per day  He has never used smokeless tobacco  He reports that he does not drink alcohol or use drugs  I have reviewed the past medical history, surgical history, social and family history, current medications, allergies vitals, review of systems, and updated this information as appropriate today  Objective:    Physical Exam    Neurological Exam      GENERAL:  Cooperative in no acute distress  Well-developed and well-nourished    HEAD and NECK   Head is atraumatic normocephalic with no lesions or masses  Neck is supple with full range of motion    CARDIOVASCULAR  Carotid Arteries-no carotid bruits  NEUROLOGIC:  Mental Status-the patient is awake alert and oriented without aphasia or apraxia, [place] is 13/30  Cranial Nerves: Visual fields are full to confrontation  Visual acuity is 20/20 with hand-held chart Extraocular movements are full without nystagmus  Pupils are 2-1/2 mm and reactive  Face is symmetrical to light touch  Movements of facial expression move symmetrically  Hearing is normal to finger rub bilaterally  Soft palate lifts symmetrically  Shoulder shrug is symmetrical  Tongue is midline without atrophy  Motor: No drift is noted on arm extension  Strength is full in the upper and lower extremities with normal bulk and tone  Sensory: Intact to temperature and vibratory sensation in the upper and lower extremities bilaterally  Cortical function is intact  Coordination: Finger to nose testing is performed accurately  Romberg is negative  Gait reveals a normal base with symmetrical arm swing  Tandem walk is normal   Reflexes:       2+ and symmetrical Toes are downgoing        ROS:  Review of Systems   Constitutional: Negative  Negative for appetite change and fever  HENT: Negative  Negative for hearing loss, tinnitus, trouble swallowing and voice change  Eyes: Negative  Negative for photophobia and pain  Respiratory: Negative  Negative for shortness of breath  Cardiovascular: Negative  Negative for palpitations  Gastrointestinal: Negative  Negative for nausea and vomiting  Endocrine: Negative  Negative for cold intolerance  Genitourinary: Negative  Negative for dysuria, frequency and urgency  Musculoskeletal: Negative  Negative for myalgias and neck pain     Skin: Negative  Negative for rash  Neurological: Negative  Negative for dizziness, tremors, seizures, syncope, facial asymmetry, speech difficulty, weakness, light-headedness, numbness and headaches  Hematological: Bruises/bleeds easily (Bruises easily)  Psychiatric/Behavioral: Positive for confusion (Sometimes)  Negative for hallucinations and sleep disturbance

## 2021-03-03 ENCOUNTER — LAB (OUTPATIENT)
Dept: LAB | Facility: HOSPITAL | Age: 66
End: 2021-03-03
Attending: INTERNAL MEDICINE
Payer: COMMERCIAL

## 2021-03-03 ENCOUNTER — TRANSCRIBE ORDERS (OUTPATIENT)
Dept: ADMINISTRATIVE | Facility: HOSPITAL | Age: 66
End: 2021-03-03

## 2021-03-03 DIAGNOSIS — D75.1 POLYCYTHEMIA, SECONDARY: ICD-10-CM

## 2021-03-03 DIAGNOSIS — D75.1 POLYCYTHEMIA, SECONDARY: Primary | ICD-10-CM

## 2021-03-03 LAB — FERRITIN SERPL-MCNC: 205 NG/ML (ref 8–388)

## 2021-03-03 PROCEDURE — 36415 COLL VENOUS BLD VENIPUNCTURE: CPT

## 2021-03-03 PROCEDURE — 82728 ASSAY OF FERRITIN: CPT

## 2021-03-11 ENCOUNTER — TELEPHONE (OUTPATIENT)
Dept: NEUROLOGY | Facility: CLINIC | Age: 66
End: 2021-03-11

## 2021-05-25 ENCOUNTER — TELEPHONE (OUTPATIENT)
Dept: NEUROLOGY | Facility: CLINIC | Age: 66
End: 2021-05-25

## 2021-05-25 NOTE — TELEPHONE ENCOUNTER
Pt's wife called in asking for results of pt's memory test  We do not have these results  I asked her to call back with the information of where pt had the neuropsych testing done and we can obtain the records for Dr Colette Marie to review

## 2021-05-26 NOTE — TELEPHONE ENCOUNTER
Patient's wife called with the number for nelson Kemp  [phone]  Had to leave a  requesting their office call us back and provided our fax number to send the report to us

## 2021-05-27 ENCOUNTER — TELEPHONE (OUTPATIENT)
Dept: NEUROLOGY | Facility: CLINIC | Age: 66
End: 2021-05-27

## 2021-05-27 NOTE — TELEPHONE ENCOUNTER
KAVIN ji's neuropsychology report can now be found in the media tab   Pt has an appt scheduled for tomorrow with Dr Carl Wing

## 2021-05-28 ENCOUNTER — OFFICE VISIT (OUTPATIENT)
Dept: NEUROLOGY | Facility: CLINIC | Age: 66
End: 2021-05-28
Payer: COMMERCIAL

## 2021-05-28 VITALS
HEIGHT: 70 IN | HEART RATE: 76 BPM | WEIGHT: 166 LBS | DIASTOLIC BLOOD PRESSURE: 68 MMHG | SYSTOLIC BLOOD PRESSURE: 118 MMHG | BODY MASS INDEX: 23.77 KG/M2

## 2021-05-28 DIAGNOSIS — G30.0 EARLY ONSET ALZHEIMER'S DEMENTIA WITHOUT BEHAVIORAL DISTURBANCE (HCC): Primary | ICD-10-CM

## 2021-05-28 DIAGNOSIS — F02.80 EARLY ONSET ALZHEIMER'S DEMENTIA WITHOUT BEHAVIORAL DISTURBANCE (HCC): Primary | ICD-10-CM

## 2021-05-28 PROCEDURE — 99214 OFFICE O/P EST MOD 30 MIN: CPT | Performed by: PSYCHIATRY & NEUROLOGY

## 2021-05-28 RX ORDER — MEMANTINE HYDROCHLORIDE 10 MG/1
10 TABLET ORAL 2 TIMES DAILY
Qty: 60 TABLET | Refills: 5 | Status: SHIPPED | OUTPATIENT
Start: 2021-05-28 | End: 2021-10-29

## 2021-05-28 RX ORDER — MEMANTINE HYDROCHLORIDE 5 MG-10 MG
KIT ORAL
Qty: 1 TABLET | Refills: 0 | Status: SHIPPED | OUTPATIENT
Start: 2021-05-28 | End: 2021-07-06 | Stop reason: ALTCHOICE

## 2021-05-28 NOTE — PROGRESS NOTES
Johann Byrne is a 78 y o  male  Assessment:  1  Early onset Alzheimer's dementia without behavioral disturbance (HCC)        Plan:  Continue with Aricept 10 mg once a day  Namenda titration pack followed by 10 mg twice a day  continue with mentally stimulating exercises  follow-up in 4 months    Discussion:     patient has early onset Alzheimer's dementia, his last [place] was 13/30, he recently went for neuropsych testing and they  Confirmed  that patient does have dementia, he is tolerating Aricept well, I advised them that he should go on Namenda titration pack followed by 10 mg twice a day side effects of the medication discussed with them in detail and they will call me and stop the medication if experiences any side effects, he was advised to be under constant supervision continue with mentally stimulating exercises to keep his blood pressure cholesterol and sugar under control, I am going to check a vitamin D level and an ammonia level as is vitamin D in the past was low and ammonia was slightly elevated, they will call me after the test to discuss the results, I have advised him to quit smoking, to go to the hospital if has any worsening symptoms and call me otherwise to see me back in 4 months and follow up with the other physicians  Subjective:    HPI    patient is here accompanied with his wife in follow-up for his memory difficulties and dementia, he has been having the symptoms for more than a year, since his last visit he seems to be doing okay he denies having any headache there is no dizziness no vision or speech difficulty no motor or sensory symptoms in upper or lower extremity, his appetite is good weight has been stable his mood and sleep is good, no history of seizures, he is tolerating the Aricept well, he recently had neuropsych testing that did confirm that he has  early-onset Alzheimer's dementia, no other complaints      Vitals:    05/28/21 1349   BP: 118/68   Pulse: 76   Weight: 75 3 kg (166 lb)   Height: 5' 10" (1 778 m)       Current Medications    Current Outpatient Medications:     donepezil (ARICEPT) 10 mg tablet, Take 1 tablet (10 mg total) by mouth daily at bedtime, Disp: 30 tablet, Rfl: 5    donepezil (ARICEPT) 5 mg tablet, Take 5 mg by mouth daily, Disp: , Rfl:     naproxen (NAPROSYN) 500 mg tablet, Take 1 tablet (500 mg total) by mouth 2 (two) times a day as needed for mild pain (take with food) for up to 20 doses (Patient not taking: Reported on 3/2/2021), Disp: 20 tablet, Rfl: 0    oxyCODONE (ROXICODONE) 5 mg immediate release tablet, Take 1 tablet (5 mg total) by mouth every 4 (four) hours as needed for moderate pain for up to 5 doses Max Daily Amount: 30 mg (Patient not taking: Reported on 3/2/2021), Disp: 5 tablet, Rfl: 0      Allergies  Patient has no known allergies  Past Medical History  History reviewed  No pertinent past medical history  Past Surgical History:  History reviewed  No pertinent surgical history  Family History:  History reviewed  No pertinent family history  Social History:   reports that he has been smoking  He has been smoking about 1 00 pack per day  He has never used smokeless tobacco  He reports that he does not drink alcohol or use drugs  I have reviewed the past medical history, surgical history, social and family history, current medications, allergies vitals, review of systems, and updated this information as appropriate today  Objective:    Physical Exam    Neurological Exam    GENERAL:  Cooperative in no acute distress  Well-developed and well-nourished    HEAD and NECK   Head is atraumatic normocephalic with no lesions or masses  Neck is supple with full range of motion    CARDIOVASCULAR  Carotid Arteries-no carotid bruits  NEUROLOGIC:  Mental Status-the patient is awake alert and oriented without aphasia or apraxia  Cranial Nerves: Visual fields are full to confrontation     Extraocular movements are full without nystagmus  Pupils are 2-1/2 mm and reactive  Face is symmetrical to light touch  Movements of facial expression move symmetrically  Hearing is normal to finger rub bilaterally  Soft palate lifts symmetrically  Shoulder shrug is symmetrical  Tongue is midline without atrophy  Motor: No drift is noted on arm extension  Strength is full in the upper and lower extremities with normal bulk and tone  Coordination: Finger to nose testing is performed accurately    Gait reveals a normal base with symmetrical arm swing  ROS:  Review of Systems   Constitutional: Negative  Negative for appetite change and fever  HENT: Negative  Negative for hearing loss, tinnitus, trouble swallowing and voice change  Eyes: Negative  Negative for photophobia and pain  Respiratory: Negative  Negative for shortness of breath  Cardiovascular: Negative  Negative for palpitations  Gastrointestinal: Negative  Negative for nausea and vomiting  Endocrine: Negative  Negative for cold intolerance  Genitourinary: Negative  Negative for dysuria, frequency and urgency  Musculoskeletal: Negative  Negative for myalgias and neck pain  Skin: Negative  Negative for rash  Neurological: Negative  Negative for dizziness, tremors, seizures, syncope, facial asymmetry, speech difficulty, weakness, light-headedness, numbness and headaches  Hematological: Negative  Does not bruise/bleed easily  Psychiatric/Behavioral: Negative  Negative for confusion, hallucinations and sleep disturbance

## 2021-06-10 ENCOUNTER — TELEPHONE (OUTPATIENT)
Dept: NEUROLOGY | Facility: CLINIC | Age: 66
End: 2021-06-10

## 2021-06-10 NOTE — TELEPHONE ENCOUNTER
Patient's wife questioning if the patient is supposed to be on both the donepezil and namenda  Per office notes:  Plan:  Continue with Aricept 10 mg once a day  Namenda titration pack followed by 10 mg twice a day  continue with mentally stimulating exercises  follow-up in 4 months    Reviewed the plan with her and she verbalized understanding that patient is supposed to be on both meds

## 2021-07-06 DIAGNOSIS — G30.0 EARLY ONSET ALZHEIMER'S DEMENTIA WITHOUT BEHAVIORAL DISTURBANCE (HCC): ICD-10-CM

## 2021-07-06 DIAGNOSIS — F02.80 EARLY ONSET ALZHEIMER'S DEMENTIA WITHOUT BEHAVIORAL DISTURBANCE (HCC): ICD-10-CM

## 2021-07-06 RX ORDER — DONEPEZIL HYDROCHLORIDE 10 MG/1
10 TABLET, FILM COATED ORAL
Qty: 30 TABLET | Refills: 3 | Status: SHIPPED | OUTPATIENT
Start: 2021-07-06 | End: 2021-11-30

## 2021-09-29 ENCOUNTER — TELEPHONE (OUTPATIENT)
Dept: NEUROLOGY | Facility: CLINIC | Age: 66
End: 2021-09-29

## 2021-09-29 NOTE — TELEPHONE ENCOUNTER
RUI for patient to start face time visit, asked him to return call    Tried calling patient again DANA

## 2021-10-29 DIAGNOSIS — F02.80 EARLY ONSET ALZHEIMER'S DEMENTIA WITHOUT BEHAVIORAL DISTURBANCE (HCC): ICD-10-CM

## 2021-10-29 DIAGNOSIS — G30.0 EARLY ONSET ALZHEIMER'S DEMENTIA WITHOUT BEHAVIORAL DISTURBANCE (HCC): ICD-10-CM

## 2021-10-29 RX ORDER — MEMANTINE HYDROCHLORIDE 10 MG/1
TABLET ORAL
Qty: 180 TABLET | Refills: 2 | Status: SHIPPED | OUTPATIENT
Start: 2021-10-29 | End: 2022-01-14 | Stop reason: SDUPTHER

## 2021-11-30 DIAGNOSIS — F02.80 EARLY ONSET ALZHEIMER'S DEMENTIA WITHOUT BEHAVIORAL DISTURBANCE (HCC): ICD-10-CM

## 2021-11-30 DIAGNOSIS — G30.0 EARLY ONSET ALZHEIMER'S DEMENTIA WITHOUT BEHAVIORAL DISTURBANCE (HCC): ICD-10-CM

## 2021-11-30 RX ORDER — DONEPEZIL HYDROCHLORIDE 10 MG/1
TABLET, FILM COATED ORAL
Qty: 90 TABLET | Refills: 2 | Status: SHIPPED | OUTPATIENT
Start: 2021-11-30 | End: 2022-01-14 | Stop reason: SDUPTHER

## 2022-01-14 ENCOUNTER — TELEMEDICINE (OUTPATIENT)
Dept: NEUROLOGY | Facility: CLINIC | Age: 67
End: 2022-01-14
Payer: COMMERCIAL

## 2022-01-14 DIAGNOSIS — F02.80 EARLY ONSET ALZHEIMER'S DEMENTIA WITHOUT BEHAVIORAL DISTURBANCE (HCC): ICD-10-CM

## 2022-01-14 DIAGNOSIS — G30.0 EARLY ONSET ALZHEIMER'S DEMENTIA WITHOUT BEHAVIORAL DISTURBANCE (HCC): ICD-10-CM

## 2022-01-14 PROCEDURE — 99442 PR PHYS/QHP TELEPHONE EVALUATION 11-20 MIN: CPT | Performed by: PSYCHIATRY & NEUROLOGY

## 2022-01-14 RX ORDER — MEMANTINE HYDROCHLORIDE 10 MG/1
10 TABLET ORAL 2 TIMES DAILY
Qty: 180 TABLET | Refills: 2 | Status: SHIPPED | OUTPATIENT
Start: 2022-01-14 | End: 2022-06-06 | Stop reason: SDUPTHER

## 2022-01-14 RX ORDER — DONEPEZIL HYDROCHLORIDE 10 MG/1
10 TABLET, FILM COATED ORAL
Qty: 90 TABLET | Refills: 2 | Status: SHIPPED | OUTPATIENT
Start: 2022-01-14 | End: 2022-06-06 | Stop reason: SDUPTHER

## 2022-01-14 NOTE — PROGRESS NOTES
Virtual Regular Visit    Verification of patient location:    Patient is located in the following state in which I hold an active license [place]      Assessment/Plan:  1  Early onset Alzheimer's dementia without behavioral disturbance [facility])          Patient has early-onset Alzheimer's dementia, last [place] was 13/30, has had neuropsych testing in the past and if confirm the patient does have dementia, he is on Aricept and Namenda and is tolerating it well, patient and the wife was advised that he should continue with same to keep his blood pressure cholesterol and sugar under control to continue with mentally stimulating exercises to be under constant supervision he has already quit smoking to go to the hospital if has any worsening symptoms and call me otherwise to see me back in office in 3-4 months and follow up with his other physicians  They did not have video capability and hence a telephone conversation was done  Problem List Items Addressed This Visit        Nervous and Auditory    Early onset Alzheimer's dementia without behavioral disturbance ([facility] [address] )               Reason for visit is   Chief Complaint   Patient presents with    Virtual Regular Visit        Encounter provider Lorena Da Silva MD    Provider located at [address] [address]      Recent Visits  No visits were found meeting these conditions  Showing recent visits within past 7 days and meeting all other requirements  Future Appointments  No visits were found meeting these conditions  Showing future appointments within next 150 days and meeting all other requirements       The patient was identified by name and date of birth  Lexx Samaniego was informed that this is a telemedicine visit and that the visit is being conducted through Telephone  My office door was closed  No one else was in the room    He acknowledged consent and understanding of privacy and security of the video platform  The patient has agreed to participate and understands they can discontinue the visit at any time  Patient is aware this is a billable service  Subjective  Theresa Quiñones is a 78 y o  male in follow-up for his dementia, according to the wife since his last visit he is doing good he is on Aricept and Namenda and is tolerating well he denies having any headaches is no dizziness no vision or speech difficulty no motor or sensory symptoms in upper or lower extremities his appetite is good weight has been stable his mood and sleep is good no history of seizures no hallucinations he is able to do his ADLs he is under constant supervision and lives with his wife, his wife has the power of   , both the wife and the  were on the speak phone  Prabha Griffin HPI     History reviewed  No pertinent past medical history  History reviewed  No pertinent surgical history  Current Outpatient Medications   Medication Sig Dispense Refill    donepezil (ARICEPT) 10 mg tablet TAKE 1 TABLET BY MOUTH DAILY AT BEDTIME 90 tablet 2    memantine (NAMENDA) 10 mg tablet TAKE 1 TABLET BY MOUTH TWICE A  tablet 2    naproxen (NAPROSYN) 500 mg tablet Take 1 tablet (500 mg total) by mouth 2 (two) times a day as needed for mild pain (take with food) for up to 20 doses (Patient not taking: Reported on 1/14/2022 ) 20 tablet 0    oxyCODONE (ROXICODONE) 5 mg immediate release tablet Take 1 tablet (5 mg total) by mouth every 4 (four) hours as needed for moderate pain for up to 5 doses Max Daily Amount: 30 mg (Patient not taking: Reported on 1/14/2022 ) 5 tablet 0     No current facility-administered medications for this visit  No Known Allergies    Review of Systems   Constitutional: Negative  Negative for appetite change and fever  HENT: Negative  Negative for hearing loss, tinnitus, trouble swallowing and voice change  Eyes: Negative    Negative for photophobia and pain    Respiratory: Negative  Negative for shortness of breath  Cardiovascular: Negative  Negative for palpitations  Gastrointestinal: Negative  Negative for nausea and vomiting  Endocrine: Negative  Negative for cold intolerance  Genitourinary: Negative  Negative for dysuria, frequency and urgency  Musculoskeletal: Negative  Negative for myalgias and neck pain  Skin: Negative  Negative for rash  Neurological: Negative  Negative for dizziness, tremors, seizures, syncope, facial asymmetry, speech difficulty, weakness, light-headedness, numbness and headaches  Hematological: Negative  Does not bruise/bleed easily  Psychiatric/Behavioral: Negative  Negative for confusion, hallucinations and sleep disturbance  Video Exam    There were no vitals filed for this visit  Physical Exam     I spent 10 minutes directly with the patient during this visit    VIRTUAL VISIT Lauri Solo verbally agrees to participate in [employer]  Pt is aware that [employer] could be limited without vital signs or the ability to perform a full hands-on physical Lexx Sandhu understands he or the provider may request at any time to terminate the video visit and request the patient to seek care or treatment in person

## 2022-06-06 ENCOUNTER — OFFICE VISIT (OUTPATIENT)
Dept: NEUROLOGY | Facility: CLINIC | Age: 67
End: 2022-06-06
Payer: COMMERCIAL

## 2022-06-06 VITALS
OXYGEN SATURATION: 96 % | HEART RATE: 79 BPM | BODY MASS INDEX: 25.91 KG/M2 | TEMPERATURE: 98.4 F | DIASTOLIC BLOOD PRESSURE: 80 MMHG | SYSTOLIC BLOOD PRESSURE: 120 MMHG | WEIGHT: 181 LBS | HEIGHT: 70 IN

## 2022-06-06 DIAGNOSIS — F02.80 EARLY ONSET ALZHEIMER'S DEMENTIA WITHOUT BEHAVIORAL DISTURBANCE (HCC): Primary | ICD-10-CM

## 2022-06-06 DIAGNOSIS — G30.0 EARLY ONSET ALZHEIMER'S DEMENTIA WITHOUT BEHAVIORAL DISTURBANCE (HCC): Primary | ICD-10-CM

## 2022-06-06 PROCEDURE — 99214 OFFICE O/P EST MOD 30 MIN: CPT | Performed by: PSYCHIATRY & NEUROLOGY

## 2022-06-06 RX ORDER — DONEPEZIL HYDROCHLORIDE 10 MG/1
10 TABLET, FILM COATED ORAL
Qty: 90 TABLET | Refills: 2 | Status: SHIPPED | OUTPATIENT
Start: 2022-06-06

## 2022-06-06 RX ORDER — MEMANTINE HYDROCHLORIDE 10 MG/1
10 TABLET ORAL 2 TIMES DAILY
Qty: 180 TABLET | Refills: 2 | Status: SHIPPED | OUTPATIENT
Start: 2022-06-06

## 2022-06-06 NOTE — PROGRESS NOTES
Cornelia Weaver is a 75 y o  male  Chief Complaint   Patient presents with    Early onset Alzheimer's dementia without behavioral disturb       Assessment:  1  Early onset Alzheimer's dementia without behavioral disturbance ([facility] [address] )        Plan:  Continue with Aricept and Namenda  Follow-up in 6 months  Discussion:  Patient has early-onset Alzheimer's dementia, last Moore was 13/30 his Moore has decreased and is now 6/30 he is currently on Aricept and Namenda and is tolerating it well I have advised him to continue with same he was also advised to take a multivitamin to continue with mentally stimulating exercises I did offer the family in the past for patient to be seen at a [address] [place] [address] they are not too keen, he was advised mentally stimulating exercises to keep his blood pressure cholesterol and sugar under control to be under constant supervision to go to the hospital if has any worsening symptoms and call me he was advised to walk with his wife so that he can get some exercise, to go to the hospital if has any worsening symptoms and call me otherwise to see me back in 6 months and follow up with his other physicians    Subjective:    HPI   Patient is here in follow-up for his dementia, according to the wife since his last visit he is doing good, he is on Aricept and Namenda and is tolerating it well he denies having any headaches there is no dizziness no vision or speech difficulty no motor or sensory symptoms in upper or lower extremities his appetite is good he is able to do his ADLs his weight has been stable mood is good sleep is good no hallucinations no history of seizures he is under constant supervision and lives with his wife his wife has the power of , no other complaints      Vitals:    06/06/22 1533   BP: 120/80   BP Location: Right arm   Patient Position: Sitting   Cuff Size: Adult   Pulse: 79   Temp: 98 4 °F (36 9 °C)   TempSrc: Temporal   SpO2: 96%   Height: 5' 10" (1 778 m) Current Medications    Current Outpatient Medications:     donepezil (ARICEPT) 10 mg tablet, Take 1 tablet (10 mg total) by mouth daily at bedtime, Disp: 90 tablet, Rfl: 2    memantine (NAMENDA) 10 mg tablet, Take 1 tablet (10 mg total) by mouth 2 (two) times a day, Disp: 180 tablet, Rfl: 2    naproxen (NAPROSYN) 500 mg tablet, Take 1 tablet (500 mg total) by mouth 2 (two) times a day as needed for mild pain (take with food) for up to 20 doses (Patient not taking: Reported on 6/6/2022), Disp: 20 tablet, Rfl: 0    oxyCODONE (ROXICODONE) 5 mg immediate release tablet, Take 1 tablet (5 mg total) by mouth every 4 (four) hours as needed for moderate pain for up to 5 doses Max Daily Amount: 30 mg (Patient not taking: No sig reported), Disp: 5 tablet, Rfl: 0      Allergies  Patient has no known allergies  Past Medical History  History reviewed  No pertinent past medical history  Past Surgical History:  History reviewed  No pertinent surgical history  Family History:  History reviewed  No pertinent family history  Social History:   reports that he has been smoking  He has been smoking about 1 00 pack per day  He has never used smokeless tobacco  He reports that he does not drink alcohol and does not use drugs  I have reviewed the past medical history, surgical history, social and family history, current medications, allergies vitals, review of systems, and updated this information as appropriate today  Objective:    Physical Exam    Neurological Exam      GENERAL:  Cooperative in no acute distress  Well-developed and well-nourished    HEAD and NECK   Head is atraumatic normocephalic with no lesions or masses  Neck is supple with full range of motion    CARDIOVASCULAR  Carotid Arteries-no carotid bruits  NEUROLOGIC:  Mental Status-the patient is awake alert and oriented without aphasia or apraxia,W[place] is 6/30  Cranial Nerves: Visual fields are full to confrontation    Extraocular movements are full without nystagmus  Pupils are 2-1/2 mm and reactive  Face is symmetrical to light touch  Movements of facial expression move symmetrically  Hearing is normal to finger rub bilaterally  Soft palate lifts symmetrically  Shoulder shrug is symmetrical  Tongue is midline without atrophy  Motor: No drift is noted on arm extension  Strength is full in the upper and lower extremities with normal bulk and tone  Gait is unremarkable        ROS:  Review of Systems   Constitutional: Negative  Negative for appetite change and fever  HENT: Negative  Negative for hearing loss, tinnitus, trouble swallowing and voice change  Eyes: Negative  Negative for photophobia and pain  Respiratory: Negative  Negative for shortness of breath  Cardiovascular: Negative  Negative for palpitations  Gastrointestinal: Negative  Negative for nausea and vomiting  Endocrine: Negative  Negative for cold intolerance  Genitourinary: Positive for frequency and urgency  Negative for dysuria  Musculoskeletal: Negative  Negative for myalgias and neck pain  Skin: Negative  Negative for rash  Neurological: Negative  Negative for dizziness, tremors, seizures, syncope, facial asymmetry, speech difficulty, weakness, light-headedness, numbness and headaches  Hematological: Bruises/bleeds easily  Psychiatric/Behavioral: Negative  Negative for confusion, hallucinations and sleep disturbance

## 2022-12-06 ENCOUNTER — TELEPHONE (OUTPATIENT)
Dept: NEUROLOGY | Facility: CLINIC | Age: 67
End: 2022-12-06

## 2022-12-06 NOTE — TELEPHONE ENCOUNTER
Add on- 12/08/22 AT 2 PM with Lizzette Aj    Pt has called back and re-scheduled today appt to Thursday      E-Verified    Thank you,     Sherre Scheuermann

## 2022-12-06 NOTE — TELEPHONE ENCOUNTER
Left message informing that appt needs to be rs due to provider being out of the office     If pt calls back please r s

## 2022-12-07 ENCOUNTER — TELEPHONE (OUTPATIENT)
Dept: NEUROLOGY | Facility: CLINIC | Age: 67
End: 2022-12-07

## 2022-12-07 NOTE — TELEPHONE ENCOUNTER
LVM for patient to call office to reschedule Dec 8th appointment with provider as he is out of office sick

## 2023-01-10 ENCOUNTER — TELEPHONE (OUTPATIENT)
Dept: NEUROLOGY | Facility: CLINIC | Age: 68
End: 2023-01-10

## 2023-01-10 NOTE — TELEPHONE ENCOUNTER
patient wife called for a sooner appt  I offered her 1-11-23 at 1 pm with Dr Prema Cantrell and she accepted

## 2023-01-11 ENCOUNTER — OFFICE VISIT (OUTPATIENT)
Dept: NEUROLOGY | Facility: CLINIC | Age: 68
End: 2023-01-11

## 2023-01-11 VITALS
HEIGHT: 70 IN | BODY MASS INDEX: 25.77 KG/M2 | DIASTOLIC BLOOD PRESSURE: 74 MMHG | OXYGEN SATURATION: 98 % | SYSTOLIC BLOOD PRESSURE: 118 MMHG | HEART RATE: 76 BPM | RESPIRATION RATE: 18 BRPM | WEIGHT: 180 LBS

## 2023-01-11 DIAGNOSIS — G30.0 EARLY ONSET ALZHEIMER'S DEMENTIA WITHOUT BEHAVIORAL DISTURBANCE (HCC): Primary | ICD-10-CM

## 2023-01-11 DIAGNOSIS — F02.80 EARLY ONSET ALZHEIMER'S DEMENTIA WITHOUT BEHAVIORAL DISTURBANCE (HCC): Primary | ICD-10-CM

## 2023-01-11 RX ORDER — DONEPEZIL HYDROCHLORIDE 10 MG/1
10 TABLET, FILM COATED ORAL
Qty: 90 TABLET | Refills: 2 | Status: SHIPPED | OUTPATIENT
Start: 2023-01-11

## 2023-01-11 RX ORDER — MEMANTINE HYDROCHLORIDE 10 MG/1
10 TABLET ORAL 2 TIMES DAILY
Qty: 180 TABLET | Refills: 2 | Status: SHIPPED | OUTPATIENT
Start: 2023-01-11

## 2023-01-11 NOTE — PROGRESS NOTES
Marcellus Stone is a 75 y o  male  Chief Complaint   Patient presents with   • Dementia       Assessment:  1  Early onset Alzheimer's dementia without behavioral disturbance ([address] )        Plan:  MRI of the brain with neuro quant,  Refer to occupational therapy for cognitive behavioral therapy  Continue with Aricept and Namenda  Patient was given the option to be followed at the [facility] for second opinion  Fall and safety precautions  Follow-up in 6 months  Discussion:  Patient has early onset Alzheimer's dementia, his last MoCA was 6/30, he is on Aricept and Namenda, he was advised to continue with same in the past also patient was advised that he he could follow-up at [facility], patient's family is going to look into that, they are not very keen in the past, they will look into that, also would recommend patient to be seen by Occupational Therapy for cognitive behavioral therapy and was advised mentally stimulating exercises to keep his blood pressure cholesterol and sugar under control to be under constant supervision, we will repeat an MRI of the brain with neuro quant and blood work, family to call me after the test to discuss the results, to go to the hospital if has any worsening symptoms and call me otherwise to see me back in 4 months and follow-up with his other physicians      Subjective:    HPI   Patient is here in follow-up for his dementia, he is accompanied with his son and his wife, since the last visit he is about the same he is on Aricept and Namenda and is tolerating it well according to the wife he still continues to have short-term memory issues he denies having any headaches there is no dizziness no vision or speech difficulty no motor or sensory symptoms in upper or lower extremities his appetite is good he is able to do his ADLs his weight has been stable his mood is good sleep is reasonably good no hallucinations no history of seizures he is under constant supervision and lives with his wife and son wife has a power of , denies any alcohol use, his dad had dementia at a later age  He has not had any falls   no other complaints  Vitals:    01/11/23 1253   BP: 118/74   BP Location: Right arm   Patient Position: Sitting   Cuff Size: Standard   Pulse: 76   Resp: 18   SpO2: 98%   Weight: 81 6 kg (180 lb)   Height: 5' 10" (1 778 m)       Current Medications    Current Outpatient Medications:   •  donepezil (ARICEPT) 10 mg tablet, Take 1 tablet (10 mg total) by mouth daily at bedtime, Disp: 90 tablet, Rfl: 2  •  memantine (NAMENDA) 10 mg tablet, Take 1 tablet (10 mg total) by mouth 2 (two) times a day, Disp: 180 tablet, Rfl: 2  •  naproxen (NAPROSYN) 500 mg tablet, Take 1 tablet (500 mg total) by mouth 2 (two) times a day as needed for mild pain (take with food) for up to 20 doses (Patient not taking: Reported on 6/6/2022), Disp: 20 tablet, Rfl: 0  •  oxyCODONE (ROXICODONE) 5 mg immediate release tablet, Take 1 tablet (5 mg total) by mouth every 4 (four) hours as needed for moderate pain for up to 5 doses Max Daily Amount: 30 mg (Patient not taking: Reported on 1/14/2022), Disp: 5 tablet, Rfl: 0      Allergies  Patient has no known allergies  Past Medical History  History reviewed  No pertinent past medical history  Past Surgical History:  History reviewed  No pertinent surgical history  Family History:  History reviewed  No pertinent family history  Social History:   reports that he has quit smoking  His smoking use included cigarettes  He smoked an average of 1 pack per day  He has never used smokeless tobacco  He reports that he does not drink alcohol and does not use drugs  I have reviewed the past medical history, surgical history, social and family history, current medications, allergies vitals, review of systems, and updated this information as appropriate today     Objective:    Physical Exam    Neurological Exam     GENERAL:  Cooperative in no acute distress  Well-developed and well-nourished     HEAD and NECK   Head is atraumatic normocephalic with no lesions or masses  Neck is supple with full range of motion     CARDIOVASCULAR  Carotid Arteries-no carotid bruits  NEUROLOGIC:  Mental Status-the patient is awake alert and oriented to 2 and three-step commands, was unable to tell me the month year or the president but he was able to name simple objects like a person the pen but had difficulty in naming a phone without aphasia or apraxia  Cranial Nerves: Visual fields are full to confrontation  Extraocular movements are full without nystagmus  Pupils are 2-1/2 mm and reactive  Face is symmetrical to light touch  Movements of facial expression move symmetrically  Hearing is normal to finger rub bilaterally  Soft palate lifts symmetrically  Shoulder shrug is symmetrical  Tongue is midline without atrophy  Motor: No drift is noted on arm extension  Strength is full in the upper and lower extremities with normal bulk and tone  Gait is unremarkable  ROS:  Review of Systems   Constitutional: Negative  Negative for appetite change and fever  HENT: Negative  Negative for hearing loss, tinnitus, trouble swallowing and voice change  Eyes: Negative  Negative for photophobia, pain and visual disturbance  Respiratory: Negative  Negative for shortness of breath  Cardiovascular: Negative  Negative for palpitations  Gastrointestinal: Negative  Negative for nausea and vomiting  Endocrine: Negative  Negative for cold intolerance  Genitourinary: Negative  Negative for dysuria, frequency and urgency  Musculoskeletal: Negative  Negative for gait problem, myalgias and neck pain  Skin: Negative  Negative for rash  Allergic/Immunologic: Negative  Neurological: Negative  Negative for dizziness, tremors, seizures, syncope, facial asymmetry, speech difficulty, weakness, light-headedness, numbness and headaches     Hematological: Negative  Does not bruise/bleed easily  Psychiatric/Behavioral: Positive for confusion  Negative for hallucinations and sleep disturbance  Patients wife states there's days where he's more confused than others

## 2023-01-18 ENCOUNTER — EVALUATION (OUTPATIENT)
Dept: OCCUPATIONAL THERAPY | Facility: MEDICAL CENTER | Age: 68
End: 2023-01-18

## 2023-01-18 DIAGNOSIS — G30.0 EARLY ONSET ALZHEIMER'S DEMENTIA WITHOUT BEHAVIORAL DISTURBANCE (HCC): Primary | ICD-10-CM

## 2023-01-18 DIAGNOSIS — F02.80 EARLY ONSET ALZHEIMER'S DEMENTIA WITHOUT BEHAVIORAL DISTURBANCE (HCC): Primary | ICD-10-CM

## 2023-01-18 NOTE — PROGRESS NOTES
OCCUPATIONAL THERAPY INITIAL EVALUATION    1/18/2023  Flakitaloni Roche  1955  [id number]  Carla Tim MD   Diagnosis ICD-10-CM Associated Orders   1  Early onset Alzheimer's dementia without behavioral disturbance ([facility] [address] )  G30 0 Ambulatory Referral to Occupational Therapy    F02 80         POC START DATE: 1/18/2023  POC END DATE: 4/12/2023  NEXT PN DUE: 2/18/2023  FREQUENCY: 1-2x/wk for 12 weeks    Assessment/Plan    SKILLED ANALYSIS:    Pt presents to OT evaluation on 01/18/20203 secondary to Alzheimer's Diagnosis with caregiver and son presents at time of evaluation  Wife reports pt has had memory problems over the past two years and had to retire early due to difficulty with memory  Pt is independent with ADLs  Requires transportation and medical management fro wife  Pt was not oriented to day, place, time today  Correctly stated birthday  RBANS used to assess attention, memory, delayed recall, visuospatial/constructional  Pt falls within the extremely low range for all subtests today with <0 1 percentile  Pt requires OT services to address attention, memory, delayed recall, visuospatial/constructional  Will work on compensatory memory strategies, OA x4, and caregiver education  Pt was a MRI brain scheduled for February 4th  Pt would benefit from OT skilled therapy to address above deficits  Pt will be seen for OT services 1x a week for 8 weeks  POC was reviewed with with pt, caregiver and pt and caregiver understands and agrees with POC  SUBJECTIVE:    Pt presents to OT evaluation on 1/18/2023 secondary to Alzheimer's Diagnosis  Pt's wife and son are present at time of evaluation and provide some subjective information  Difficulty with memory over the past two years  MRI of the brain two years ago and has another scheduled for February 4th  Does occasional drifts out of conversation when gathering medical history  Month: states its January (correct)  Day of the week: Sunday (incorrect)   Date: 13th (incorrect)  Able to state birthday  Episodes of "in" and "out" cognitively  Wife reports that long term memory is intact  Remembers friends, family, and events from the past  Short term memory is difficult  "He won't remember yesterday " Wife reports that he gets frustrated  Wife reports, "He is good with numbers "    OT PROFILE:    ADLs: No difficulty with ADLs  Able to dress, bathe, shower  Standing in the shower  IADLS: No longer driving  Wife does medication management  Work: Retired (two years ago)  Department of [employer] works  Difficulty with memory so had to retire  Leisure: [employer]  Plays horse racing on the [employer]  Sleep: No issues  Pt's Goal: Caregiver's goal is making things easier at home  PAIN:    No      Assessments  The Repeatable Battery for the Assessment of Neuropsychological Status (RBANS) is a brief, individually-administered assessment which measures attention, language, visuospatial/constructional abilities, and immediate & delayed memory  The RBANS is intended for use with adolescents to adults, ages 15 to 80 years  The following results were obtained during the administration of the assessment  Form: A    Cognitive Domain/Subtest: Index Score: Percentile Rank: Classification: IE: Status:   IMMEDIATE MEMORY 40 <0 1%ile EXTREMELY LOW          1  List Learning (6/40)          2  Story Memory (0/24)           VISUOSPATIAL/  CONSTRUCTIONAL 50 <0 1%ile EXTREMELY LOW          3  Figure Copy (0/20)          4  Line Orientation (4/20)           LANGUAGE 40 <0 1%ile EXTREMELY LOW          5  Picture Naming (4/10)          6  Semantic Fluency (2/40)           ATTENTION 40 <0 1%ile EXTREMELY LOW          7  Digit Span (8/16)          8  Coding (0/89)           DELAYED MEMORY 40 <0 1%ile EXTREMELY LOW          9  List Recall (0/10)          10  List Recognition (12/20)          11  Story Recall (1/12)          12   Figure Recall (0/20)       Sum of Index Scores:  210      Total Score:  40      Percentile: <0 1%ile      Classification: EXTREMELY LOW        “IE” indicates the scores from the initial evaluation (1/18/2023)  Form: A    SHORT TERM GOALS: (within 4 weeks)     Pt will consistently recall telephone number, address, and birthday upon treatment sessions within 4 weeks  Pt will demonstrate OA x 4 upon treatment sessions with use of compensatory memory strategies (lists, calender, etc ) within 4 weeks    Pt and Caregiver will demonstrate G carryover of use of external memory strategy aides for improved independence of daily events and accuracy     Pt will increase immediate list memory recall being able to recall > 8/40 items on RBANS     Pt will increase immediate story memory recall being able to recall > 2/24 on RBANs    Pt will increase delayed list memory recall being able to recall > 2/10 on RBANS    Pt will increase delayed story memory recall being able to recall > 3/12 on RBANS    Pt will maintain attention to task for 5 minutes in semi modal environment for baseline performance,  improved role performance and to improve learning and to simulate return to IADLs and complete cooking/cleaning tasks  LONG TERM GOALS (at time of discharge)    Pt will consistently recall telephone number, address, and birthday upon treatment sessions within 12 weeks      Pt and Caregiver will demonstrate G carryover of use of external memory strategy aides for improved independence of daily events and accuracy     Pt will demonstrate OA x 4 upon treatment sessions with use of compensatory memory strategies (lists, calender, etc ) within 12 weeks    Pt will increase immediate list memory recall being able to recall > 9/40 items on RBANS     Pt will increase immediate story memory recall being able to recall >3/24 on RBANs    Pt will increase delayed list memory recall being able to recall >3/10 on RBANS    Pt will increase delayed story memory recall being able to recall >4/12 on RBANS    Pt will maintain attention to task for 10 minutes in semi modal environment for baseline performance,  improved role performance and to improve learning and to simulate return to IADLs and complete cooking/cleaning tasks       TIME SPENT 60 min for administration, documentation, interpretation, scoring adn POC development RBANS    PLANNED THERAPY INTERVENTIONS:  Internal and external memory aides  Hypersensitivity strategies education  Multi-modal environment  Sustained/alternating/divided attention  Temporal Awareness: Organize the Hour activities  Memory and mental manipulation  Auditory processing with immediate recall  Memory retention with immediate and delayed recall  Edu on cog/vision apps

## 2023-01-27 ENCOUNTER — APPOINTMENT (OUTPATIENT)
Dept: OCCUPATIONAL THERAPY | Facility: MEDICAL CENTER | Age: 68
End: 2023-01-27

## 2023-01-30 ENCOUNTER — OFFICE VISIT (OUTPATIENT)
Dept: OCCUPATIONAL THERAPY | Facility: MEDICAL CENTER | Age: 68
End: 2023-01-30

## 2023-01-30 DIAGNOSIS — F02.80 EARLY ONSET ALZHEIMER'S DEMENTIA WITHOUT BEHAVIORAL DISTURBANCE (HCC): Primary | ICD-10-CM

## 2023-01-30 DIAGNOSIS — G30.0 EARLY ONSET ALZHEIMER'S DEMENTIA WITHOUT BEHAVIORAL DISTURBANCE (HCC): Primary | ICD-10-CM

## 2023-01-30 NOTE — PROGRESS NOTES
Occupational Therapy Daily Note:    Today's date: 2023  Patient name: Cassandra Stoll  : 1955  MRN: [id number]  Referring provider: Mikal Barragan MD  Dx:   Encounter Diagnosis   Name Primary? • Early onset Alzheimer's dementia without behavioral disturbance ([facility] [address] ) Yes        POC START DATE: 2023  POC END DATE: 2023  NEXT PN DUE: 2023  FREQUENCY: 1-2x/wk for 12 weeks    Subjective: "I'm doing ok today "    Objective: Therapeutic Activity -     Caregiver & Pt Education  Memory Strategies  External, Attention & Compensatory   Time: 15 minutes     Attention, Visual Scanning  Visuospatial   40 Card Sort Matching  Max vc for accuracy and completion   Time: 15 minutes     Attention, Visual Scanning, Pattern Recognition  52 Cards - Suit Sorting  Max vc for accuracy   Time: 10 minutes     Assessment: Pt tolerated treatment well  OT session started with pt and family education about orientation strategies with calendar by bedside or in kitchen  Strategies to include crossing off each day on the calendar  Pt education about safety for orientation by keeping address and phone number on person in pocket for reference  Pt exhibited confusion with telling time on analog clock  Recommendation of digital clock for home  Pt and caregiver education about compensatory memory strategies to include external aids, and attention strategies  Pt demonstrated impaired awareness with delayed initiation of task during 40 Card Sort Matching after instructions were provided  Pt unable to follow 2-step directions  Downgraded task for success by handing pt one card at a time  Pt demonstrated impaired pattern recognition and visual scanning during activity  Required Max vc for accuracy and completion  Pt demonstrated impaired attention and visuospaitial awareness with 52 card suit sorting  Required max vc for accuracy   Pt was able to recall correct month after clue, "First month of the year," but unable to recall day or time stated at start of session  Patient would benefit from continued skilled OT  Plan: Future OT session to include strategies for orientation, attention, safety, and simple 2 step directions  Continued skilled OT per POC  Treatment conducted by student supervised and directed by Brook Tuttle MS, OTR/L, CSRS, ISI, DCAT  I agree with all treatment methods performed during this session by SHANA Mathew  Student was directly supervised by me during the entirety of session  Patient consent given to be treated by student

## 2023-02-03 ENCOUNTER — APPOINTMENT (OUTPATIENT)
Dept: OCCUPATIONAL THERAPY | Facility: MEDICAL CENTER | Age: 68
End: 2023-02-03

## 2023-02-03 ENCOUNTER — TELEPHONE (OUTPATIENT)
Dept: NEUROLOGY | Facility: CLINIC | Age: 68
End: 2023-02-03

## 2023-02-03 DIAGNOSIS — F02.80 EARLY ONSET ALZHEIMER'S DEMENTIA, UNSPECIFIED DEMENTIA SEVERITY, UNSPECIFIED WHETHER BEHAVIORAL, PSYCHOTIC, OR MOOD DISTURBANCE OR ANXIETY (HCC): Primary | ICD-10-CM

## 2023-02-03 DIAGNOSIS — G30.0 EARLY ONSET ALZHEIMER'S DEMENTIA, UNSPECIFIED DEMENTIA SEVERITY, UNSPECIFIED WHETHER BEHAVIORAL, PSYCHOTIC, OR MOOD DISTURBANCE OR ANXIETY (HCC): Primary | ICD-10-CM

## 2023-02-03 RX ORDER — LORAZEPAM 1 MG/1
TABLET ORAL
Qty: 1 TABLET | Refills: 0 | Status: SHIPPED | OUTPATIENT
Start: 2023-02-03

## 2023-02-03 NOTE — TELEPHONE ENCOUNTER
Ruthy Felty, MD Richardo Lame; Neurology [place] Clinical Team 6 1 hour ago (2:35 PM)     Ordered in the computer

## 2023-02-04 ENCOUNTER — HOSPITAL ENCOUNTER (OUTPATIENT)
Dept: MRI IMAGING | Facility: HOSPITAL | Age: 68
Discharge: HOME | End: 2023-02-04
Attending: PSYCHIATRY & NEUROLOGY

## 2023-02-04 DIAGNOSIS — F02.80 EARLY ONSET ALZHEIMER'S DEMENTIA WITHOUT BEHAVIORAL DISTURBANCE (HCC): ICD-10-CM

## 2023-02-04 DIAGNOSIS — G30.0 EARLY ONSET ALZHEIMER'S DEMENTIA WITHOUT BEHAVIORAL DISTURBANCE (HCC): ICD-10-CM

## 2023-02-06 ENCOUNTER — OFFICE VISIT (OUTPATIENT)
Dept: OCCUPATIONAL THERAPY | Facility: MEDICAL CENTER | Age: 68
End: 2023-02-06

## 2023-02-06 DIAGNOSIS — F02.80 EARLY ONSET ALZHEIMER'S DEMENTIA WITHOUT BEHAVIORAL DISTURBANCE (HCC): Primary | ICD-10-CM

## 2023-02-06 DIAGNOSIS — G30.0 EARLY ONSET ALZHEIMER'S DEMENTIA WITHOUT BEHAVIORAL DISTURBANCE (HCC): Primary | ICD-10-CM

## 2023-02-06 NOTE — PROGRESS NOTES
Occupational Therapy Daily Note:    Today's date: 2023  Patient name: Sarah Obregon  : 1955  MRN: [id number]  Referring provider: Scot Pettit MD  Dx:   Encounter Diagnosis   Name Primary? • Early onset Alzheimer's dementia without behavioral disturbance ([facility] [address] ) Yes     POC START DATE: 2023  POC END DATE: 2023  NEXT PN DUE: 2023  FREQUENCY: 1-2x/wk for 12 weeks    Subjective: "I'm doing ok today "    Objective: Therapeutic Activity -     Orientation, Problem Solving  Temporal Orientation Skills  Answered seven questions with mod vc throughout  Time: 10 minutes    Auditory Recall, Immediate Memory Task  Recall two digits, three digits, and four digits  Time: 5 minutes    Neuromuscular Re-education -     Sustained Attention, Sequencing, Visual scanning  Multimatrix Sequencing  Letters A-X, mod vc  Numbers 1-24, max vc  Time: 30 minutes    Assessment: Pt tolerated treatment well  OT session began with discussion with family on implementation of calender for orientation of day of the week, month, date, and year  Pt unable to recall date today when asked  Able to recall birthday, did not recall age  Pt session focused on attention, sequencing, and visual scanning with multimatrix cubes  Sequenced alphabet and numbers in order with max vc for numbers and mod vc for letters  Improved performance with sequencing multimatrix cubes towards end of task, as indicated by less vc required  Provided with letters and numbers for practice with sequencing of letters and numbers for HEP  Patient would benefit from continued skilled OT  Plan: Future OT session to include strategies for orientation, attention, safety, and simple 2 step directions  Continued skilled OT per POC  Treatment conducted by student supervised and directed by Greg Mccray MS, OTR/L, CSRS, ISI, DCAT  I agree with all treatment methods performed during this session by SHANA Amin   Student was directly supervised by me during the entirety of session  Patient consent given to be treated by student

## 2023-02-07 ENCOUNTER — TELEPHONE (OUTPATIENT)
Dept: NEUROLOGY | Facility: CLINIC | Age: 68
End: 2023-02-07

## 2023-02-07 NOTE — TELEPHONE ENCOUNTER
Dianne,     Pt's wife and pt have called requesting a call back with his MRI Brain results      Thank you,     Citlali Farah

## 2023-02-08 NOTE — TELEPHONE ENCOUNTER
Marline Jorgensen MD  You 8 minutes ago (1:57 PM)     Discussed with patient's wife MRI scan of the brain results, does show evidence of neurodegenerative etiology patient is already on Aricept and Namenda

## 2023-02-10 ENCOUNTER — OFFICE VISIT (OUTPATIENT)
Dept: OCCUPATIONAL THERAPY | Facility: MEDICAL CENTER | Age: 68
End: 2023-02-10

## 2023-02-10 DIAGNOSIS — F02.80 EARLY ONSET ALZHEIMER'S DEMENTIA WITHOUT BEHAVIORAL DISTURBANCE (HCC): Primary | ICD-10-CM

## 2023-02-10 DIAGNOSIS — G30.0 EARLY ONSET ALZHEIMER'S DEMENTIA WITHOUT BEHAVIORAL DISTURBANCE (HCC): Primary | ICD-10-CM

## 2023-02-10 NOTE — PROGRESS NOTES
Occupational Therapy Daily Note:    Today's date: 2/10/2023  Patient name: Erik Preston  : 1955  MRN: [id number]  Referring provider: Sandie Morton MD  Dx:   Encounter Diagnosis   Name Primary? • Early onset Alzheimer's dementia without behavioral disturbance ([facility] [address] ) Yes     POC START DATE: 2023  POC END DATE: 2023  NEXT PN DUE: 2023  FREQUENCY: 1-2x/wk for 12 weeks    Subjective: Reported by son: "We did the card activity at home "    Objective: Therapeutic Activity -     Pattern Recognition, Sustained Attention  Pegs into Pegboard   Max vc   Completed x 2  Time: 15 minutes    Sustained Attention, Visual Scanning  Bananagram Tiles   Max vc for visual scanning  Time: 15 minutes    Sustained Attention, Sorting  Sorting Colored Marbles into Tegile Systems  Max vc   Time: 15 minutes    Assessment: Pt tolerated treatment well  OT session began with discussion with son regarding completing card sorting task at home  Pt required max vc for all therapeutic activities today  Decreased understanding for vc words "under" "over" "after" "before" with pattern recognition and colored marble sorting  Max vc and downgrading task to few letters for visually scanning for letters of first and last name  Patient would benefit from continued skilled OT  Plan: Future OT session to include strategies for orientation, attention, safety, and simple 2 step directions  Continued skilled OT per POC  Treatment conducted by student supervised and directed by Janet He, MS, OTR/L, CSRS, ISI, DCAT  I agree with all treatment methods performed during this session by SHANA Prince  Student was directly supervised by me during the entirety of session  Patient consent given to be treated by student

## 2023-02-17 ENCOUNTER — OFFICE VISIT (OUTPATIENT)
Dept: OCCUPATIONAL THERAPY | Facility: MEDICAL CENTER | Age: 68
End: 2023-02-17

## 2023-02-17 DIAGNOSIS — F02.80 EARLY ONSET ALZHEIMER'S DEMENTIA WITHOUT BEHAVIORAL DISTURBANCE (HCC): Primary | ICD-10-CM

## 2023-02-17 DIAGNOSIS — G30.0 EARLY ONSET ALZHEIMER'S DEMENTIA WITHOUT BEHAVIORAL DISTURBANCE (HCC): Primary | ICD-10-CM

## 2023-02-17 NOTE — PROGRESS NOTES
Occupational Therapy Daily Note:    Today's date: 2023  Patient name: Oliver Willams  : 1955  MRN: [id number]  Referring provider: Maryellen Meigs, MD  Dx:   Encounter Diagnosis   Name Primary? • Early onset Alzheimer's dementia without behavioral disturbance ([facility] [address] ) Yes     POC START DATE: 2023  POC END DATE: 2023  NEXT PN DUE: 2023  FREQUENCY: 1-2x/wk for 12 weeks    Subjective: "He is getting good at the cards at home "    Objective: Therapeutic Activity -     Orientation Questions  Unable to identify date, day of the week, or year  Able to recall name and breed of dog  Time: 5 minutes    Visual Perception, Visual Scanning  Tangram Puzzle   Max A, task terminated  Time: 5 minutes    Ispy Dig In   Naming of Objects with category clues   Mod A for naming of objects  Poor visual scanning abilities  Time: 20 minutes    Assessment: Pt tolerated treatment well  Pt was unable to identify date, day of week  Able to recall name and breed of dog  Began with tangram puzzle, however, pt required max A so task was terminated  Mod A for category clues of naming objects  Poor visual scanning abilities  Patient would benefit from continued skilled OT  Plan: Future OT session to include strategies for orientation, attention, safety, and simple 2 step directions  Continued skilled OT per POC  Treatment conducted by student supervised and directed by Neil Bowden MS, OTR/L, CSRS, ISI, DCAT  I agree with all treatment methods performed during this session by SHANA Grimm  Student was directly supervised by me during the entirety of session  Patient consent given to be treated by student

## 2023-03-03 ENCOUNTER — EVALUATION (OUTPATIENT)
Dept: OCCUPATIONAL THERAPY | Facility: MEDICAL CENTER | Age: 68
End: 2023-03-03

## 2023-03-03 DIAGNOSIS — F02.80 EARLY ONSET ALZHEIMER'S DEMENTIA WITHOUT BEHAVIORAL DISTURBANCE (HCC): Primary | ICD-10-CM

## 2023-03-03 DIAGNOSIS — G30.0 EARLY ONSET ALZHEIMER'S DEMENTIA WITHOUT BEHAVIORAL DISTURBANCE (HCC): Primary | ICD-10-CM

## 2023-03-03 NOTE — PROGRESS NOTES
OCCUPATIONAL THERAPY DISCHARGE    1/18/2023  Bea Larose  [birth year]  [id number]  Kim East MD   Diagnosis ICD-10-CM Associated Orders   1  Early onset Alzheimer's dementia without behavioral disturbance ([facility] [address] )  G30 0     F02 80            POC START DATE: 1/18/2023  POC END DATE: 4/12/2023  NEXT PN DUE: 2/18/2023  FREQUENCY: 1-2x/wk for 12 weeks    Assessment/Plan    SKILLED ANALYSIS:    Pt presents to OT evaluation on 01/18/20203 secondary to Alzheimer's Diagnosis with caregiver and son presents at time of evaluation  Wife reports pt has had memory problems over the past two years and had to retire early due to difficulty with memory  Pt is independent with ADLs  Requires transportation and medical management fro wife  Pt was not oriented to day, place, time today  Correctly stated birthday  RBANS used to assess attention, memory, delayed recall, visuospatial/constructional  Pt falls within the extremely low range for all subtests today with <0 1 percentile  Pt requires OT services to address attention, memory, delayed recall, visuospatial/constructional  Will work on compensatory memory strategies, OA x4, and caregiver education  Pt was a MRI brain scheduled for February 4th  Pt would benefit from OT skilled therapy to address above deficits  Pt will be seen for OT services 1x a week for 8 weeks  POC was reviewed with with pt, caregiver and pt and caregiver understands and agrees with POC  Pt presents to OT discharge on 3/3/2023 secondary to Alzheimer's Diagnosis with wife and son present at time of re-evaluation  RBANS assessment performed for evaluation of attention, immediate memory, delayed memory, language and visuospatial skills indicates deficits  Pt scored 257 overall falling into the extremely low category <0 1% percentile compared to norms   For immediate memory, pt scored in the < 0 1% percentile falling into the extremely low category delayed memory score falls into the extremely low category < 0 1% percentile compared to norms  Pt improved in the area of attention with scoring falling into the borderline category 2% percentile compared to norms  Scores for language fell into the extremely low category, < 0 1% percentile compared to norms  Pt scored in the extremely low category <0 1% percentile for visuospatial/constructional skills compared to norms  Pt will be discharged at this time and transitioning and an HEP  Encouraged to contact therapist or doctor is cognitive skills decline  POC was reviewed with the pt and caregiver, caregiver agrees with POC  SUBJECTIVE:    Pt presents to OT evaluation on 1/18/2023 secondary to Alzheimer's Diagnosis  Pt's wife and son are present at time of evaluation and provide some subjective information  Difficulty with memory over the past two years  MRI of the brain two years ago and has another scheduled for February 4th  Does occasional drifts out of conversation when gathering medical history  Month: states its January (correct)  Day of the week: Sunday (incorrect)  Date: 13th (incorrect)  Able to state birthday  Episodes of "in" and "out" cognitively  Wife reports that long term memory is intact  Remembers friends, family, and events from the past  Short term memory is difficult  "He won't remember yesterday " Wife reports that he gets frustrated  Wife reports, "He is good with numbers "    Pt presents to OT re-evaluation on 3/3/2023 secondary to Alzheimer's Diagnosis  Therapist had conversation with caregivers today  States that they are continuing with cognitive exercises at home  They agree with taking a break from therapy and contacting therapist/doctor if skills are not maintaining  OT PROFILE:    ADLs: No difficulty with ADLs  Able to dress, bathe, shower  Standing in the shower  IADLS: No longer driving  Wife does medication management  Work: Retired (two years ago)  [employer]   Difficulty with memory so had to retire  Leisure: [employer]  Plays horse racing on the computer  Sleep: No issues  Pt's Goal: Caregiver's goal is making things easier at home  PAIN:    No      Assessments  The Repeatable Battery for the Assessment of Neuropsychological Status (RBANS) is a brief, individually-administered assessment which measures attention, language, visuospatial/constructional abilities, and immediate & delayed memory  The RBANS is intended for use with adolescents to adults, ages 15 to 80 years  The following results were obtained during the administration of the assessment  Form: B    Cognitive Domain/Subtest: Index Score: Percentile Rank: Classification: IE: Status:   IMMEDIATE MEMORY 44 <0 1%ile EXTREMELY LOW 40         1  List Learning (3/40)          2  Story Memory (6/24)           VISUOSPATIAL/  CONSTRUCTIONAL 50 <0 1%ile EXTREMELY LOW 50         3  Figure Copy (1/20)          4  Line Orientation (5/20)           LANGUAGE 51 <0 1%ile EXTREMELY LOW 40         5  Picture Naming (5/10)          6  Semantic Fluency (9/40)           ATTENTION 72 2%ile Borderline 40         7  Digit Span (10/16)          8  Coding (0/89)           DELAYED MEMORY 40 <0 1%ile EXTREMELY LOW 40         9  List Recall (0/10)          10  List Recognition (11/20)          11  Story Recall (1/12)          12  Figure Recall (1/20)       Sum of Index Scores:  257   210    Total Score:  47      Percentile: <0 1%ile      Classification: EXTREMELY LOW        “IE” indicates the scores from the initial evaluation (3/3/2023)  Form: B    SHORT TERM GOALS: (within 4 weeks)     Pt will consistently recall telephone number, address, and birthday upon treatment sessions within 4 weeks   - PROGRESSING    Pt will demonstrate OA x 4 upon treatment sessions with use of compensatory memory strategies (lists, calender, etc ) within 4 weeks - NOT MET (inconsistent)    Pt and Caregiver will demonstrate G carryover of use of external memory strategy aides for improved independence of daily events and accuracy - MET    Pt will increase immediate list memory recall being able to recall > 8/40 items on RBANS - NOT MET    Pt will increase immediate story memory recall being able to recall > 2/24 on RBANs - MET    Pt will increase delayed list memory recall being able to recall > 2/10 on RBANS - NOT MET    Pt will increase delayed story memory recall being able to recall > 3/12 on RBANS - NOT MET    Pt will maintain attention to task for 5 minutes in semi modal environment for baseline performance,  improved role performance and to improve learning and to simulate return to IADLs and complete cooking/cleaning tasks  - MET    LONG TERM GOALS (at time of discharge)    Pt will consistently recall telephone number, address, and birthday upon treatment sessions within 12 weeks  - NOT MET    Pt and Caregiver will demonstrate G carryover of use of external memory strategy aides for improved independence of daily events and accuracy - NOT MET    Pt will demonstrate OA x 4 upon treatment sessions with use of compensatory memory strategies (lists, calender, etc ) within 12 weeks - NOT MET    Pt will increase immediate list memory recall being able to recall > 9/40 items on RBANS - NOT MET    Pt will increase immediate story memory recall being able to recall >3/24 on RBANs - NOT MET    Pt will increase delayed list memory recall being able to recall >3/10 on RBANS - NOT MET    Pt will increase delayed story memory recall being able to recall >4/12 on RBANS - NOT MET    Pt will maintain attention to task for 10 minutes in semi modal environment for baseline performance,  improved role performance and to improve learning and to simulate return to IADLs and complete cooking/cleaning tasks   - MET    TIME SPENT 90 min for administration, documentation, interpretation, scoring adn POC development RBANS    PLANNED THERAPY INTERVENTIONS:  Internal and external memory aides  Hypersensitivity strategies education  Multi-modal environment  Sustained/alternating/divided attention  Temporal Awareness: Organize the Hour activities  Memory and mental manipulation  Auditory processing with immediate recall  Memory retention with immediate and delayed recall  Edu on cog/vision apps    Treatment conducted by student supervised and directed by Isaías Chisholm MS, OTR/L, CSRS, ISI, DCAT  I agree with all treatment methods performed during this session by SHANA Chavez  Student was directly supervised by me during the entirety of session  Patient consent given to be treated by student

## 2023-07-10 ENCOUNTER — TELEPHONE (OUTPATIENT)
Dept: NEUROLOGY | Facility: CLINIC | Age: 68
End: 2023-07-10

## 2023-07-10 NOTE — TELEPHONE ENCOUNTER
LVM for patient's wife to confirm upcoming appointment and reminded to get labs done prior to appointment.

## 2023-09-05 DIAGNOSIS — F02.80 EARLY ONSET ALZHEIMER'S DEMENTIA WITHOUT BEHAVIORAL DISTURBANCE (HCC): ICD-10-CM

## 2023-09-05 DIAGNOSIS — G30.0 EARLY ONSET ALZHEIMER'S DEMENTIA WITHOUT BEHAVIORAL DISTURBANCE (HCC): ICD-10-CM

## 2023-09-05 RX ORDER — DONEPEZIL HYDROCHLORIDE 10 MG/1
10 TABLET, FILM COATED ORAL
Qty: 90 TABLET | Refills: 2 | Status: SHIPPED | OUTPATIENT
Start: 2023-09-05

## 2023-09-15 ENCOUNTER — TELEPHONE (OUTPATIENT)
Dept: NEUROLOGY | Facility: CLINIC | Age: 68
End: 2023-09-15

## 2023-09-15 NOTE — TELEPHONE ENCOUNTER
Pts wife called asking about the Medical Records she requested and when she will be getting them. I do not see any notes about it but there is a MRO in scanned media that was signed on 9/8.

## 2023-10-06 ENCOUNTER — TELEPHONE (OUTPATIENT)
Dept: NEUROLOGY | Facility: CLINIC | Age: 68
End: 2023-10-06

## 2023-10-06 NOTE — TELEPHONE ENCOUNTER
I spoke with the patients wife and confirmed the upcoming appointment. I also reminded her of the labs that needs to be completed.

## 2023-10-17 ENCOUNTER — APPOINTMENT (OUTPATIENT)
Dept: LAB | Facility: HOSPITAL | Age: 68
End: 2023-10-17
Attending: PSYCHIATRY & NEUROLOGY
Payer: COMMERCIAL

## 2023-10-17 DIAGNOSIS — G30.0 EARLY ONSET ALZHEIMER'S DEMENTIA WITHOUT BEHAVIORAL DISTURBANCE (HCC): ICD-10-CM

## 2023-10-17 DIAGNOSIS — F02.80 EARLY ONSET ALZHEIMER'S DEMENTIA WITHOUT BEHAVIORAL DISTURBANCE (HCC): ICD-10-CM

## 2023-10-17 LAB
ALBUMIN SERPL BCP-MCNC: 4.2 G/DL (ref 3.5–5)
ALP SERPL-CCNC: 102 U/L (ref 34–104)
ALT SERPL W P-5'-P-CCNC: 13 U/L (ref 7–52)
ANION GAP SERPL CALCULATED.3IONS-SCNC: 5 MMOL/L
AST SERPL W P-5'-P-CCNC: 13 U/L (ref 13–39)
BASOPHILS # BLD AUTO: 0.03 THOUSANDS/ÂΜL (ref 0–0.1)
BASOPHILS NFR BLD AUTO: 0 % (ref 0–1)
BILIRUB SERPL-MCNC: 0.58 MG/DL (ref 0.2–1)
BUN SERPL-MCNC: 13 MG/DL (ref 5–25)
CALCIUM SERPL-MCNC: 9.6 MG/DL (ref 8.4–10.2)
CHLORIDE SERPL-SCNC: 101 MMOL/L (ref 96–108)
CO2 SERPL-SCNC: 33 MMOL/L (ref 21–32)
CREAT SERPL-MCNC: 0.85 MG/DL (ref 0.6–1.3)
EOSINOPHIL # BLD AUTO: 0.32 THOUSAND/ÂΜL (ref 0–0.61)
EOSINOPHIL NFR BLD AUTO: 4 % (ref 0–6)
ERYTHROCYTE [DISTWIDTH] IN BLOOD BY AUTOMATED COUNT: 13.6 % (ref 11.6–15.1)
FOLATE SERPL-MCNC: 19.5 NG/ML
GFR SERPL CREATININE-BSD FRML MDRD: 88 ML/MIN/1.73SQ M
GLUCOSE P FAST SERPL-MCNC: 98 MG/DL (ref 65–99)
HCT VFR BLD AUTO: 47.2 % (ref 36.5–49.3)
HGB BLD-MCNC: 15.7 G/DL (ref 12–17)
IMM GRANULOCYTES # BLD AUTO: 0.02 THOUSAND/UL (ref 0–0.2)
IMM GRANULOCYTES NFR BLD AUTO: 0 % (ref 0–2)
LYMPHOCYTES # BLD AUTO: 2 THOUSANDS/ÂΜL (ref 0.6–4.47)
LYMPHOCYTES NFR BLD AUTO: 22 % (ref 14–44)
MCH RBC QN AUTO: 30.2 PG (ref 26.8–34.3)
MCHC RBC AUTO-ENTMCNC: 33.3 G/DL (ref 31.4–37.4)
MCV RBC AUTO: 91 FL (ref 82–98)
MONOCYTES # BLD AUTO: 0.84 THOUSAND/ÂΜL (ref 0.17–1.22)
MONOCYTES NFR BLD AUTO: 9 % (ref 4–12)
NEUTROPHILS # BLD AUTO: 5.99 THOUSANDS/ÂΜL (ref 1.85–7.62)
NEUTS SEG NFR BLD AUTO: 65 % (ref 43–75)
NRBC BLD AUTO-RTO: 0 /100 WBCS
PLATELET # BLD AUTO: 319 THOUSANDS/UL (ref 149–390)
PMV BLD AUTO: 9.9 FL (ref 8.9–12.7)
POTASSIUM SERPL-SCNC: 3.8 MMOL/L (ref 3.5–5.3)
PROT SERPL-MCNC: 7.4 G/DL (ref 6.4–8.4)
RBC # BLD AUTO: 5.2 MILLION/UL (ref 3.88–5.62)
SODIUM SERPL-SCNC: 139 MMOL/L (ref 135–147)
TREPONEMA PALLIDUM IGG+IGM AB [PRESENCE] IN SERUM OR PLASMA BY IMMUNOASSAY: NORMAL
TSH SERPL DL<=0.05 MIU/L-ACNC: 2.8 UIU/ML (ref 0.45–4.5)
VIT B12 SERPL-MCNC: 566 PG/ML (ref 180–914)
WBC # BLD AUTO: 9.2 THOUSAND/UL (ref 4.31–10.16)

## 2023-10-17 PROCEDURE — 85025 COMPLETE CBC W/AUTO DIFF WBC: CPT

## 2023-10-17 PROCEDURE — 82607 VITAMIN B-12: CPT

## 2023-10-17 PROCEDURE — 84443 ASSAY THYROID STIM HORMONE: CPT

## 2023-10-17 PROCEDURE — 80053 COMPREHEN METABOLIC PANEL: CPT

## 2023-10-17 PROCEDURE — 86780 TREPONEMA PALLIDUM: CPT

## 2023-10-17 PROCEDURE — 36415 COLL VENOUS BLD VENIPUNCTURE: CPT

## 2023-10-17 PROCEDURE — 82746 ASSAY OF FOLIC ACID SERUM: CPT

## 2023-10-20 ENCOUNTER — OFFICE VISIT (OUTPATIENT)
Dept: NEUROLOGY | Facility: CLINIC | Age: 68
End: 2023-10-20

## 2023-10-20 VITALS
HEART RATE: 68 BPM | WEIGHT: 158.4 LBS | BODY MASS INDEX: 22.68 KG/M2 | OXYGEN SATURATION: 94 % | DIASTOLIC BLOOD PRESSURE: 60 MMHG | SYSTOLIC BLOOD PRESSURE: 112 MMHG | HEIGHT: 70 IN

## 2023-10-20 DIAGNOSIS — G30.0 EARLY ONSET ALZHEIMER'S DEMENTIA WITHOUT BEHAVIORAL DISTURBANCE (HCC): Primary | ICD-10-CM

## 2023-10-20 DIAGNOSIS — F02.80 EARLY ONSET ALZHEIMER'S DEMENTIA WITHOUT BEHAVIORAL DISTURBANCE (HCC): Primary | ICD-10-CM

## 2023-10-20 RX ORDER — MULTIVITAMIN
1 CAPSULE ORAL DAILY
COMMUNITY

## 2023-10-20 NOTE — PROGRESS NOTES
Jimenez Russell is a 70 y.o. male. Chief Complaint   Patient presents with    Early onset Alzheimer's dementia without behavioral disturba       Assessment:  1. Early onset Alzheimer's dementia without behavioral disturbance ([address])        Plan:  MRI scan of the brain suggesting mesial temporal related neurodegenerative process without any acute pathology  Continue with Aricept and Namenda. Continue with mentally stimulating exercises. To remain physically active as tolerated  To be under constant supervision. Patient's family was advised that they could take a second opinion at the [facility] they are going to think about that,. Also was advised to follow-up with an ophthalmologist  We discussed about Leqembi side effect profile family and  would like to wait for now  Follow-up in 6 months.         Subjective:    HPI   Patient is here in follow-up for dementia, according to the wife since her last visit patient is doing good he is on Aricept and Namenda and is tolerating it well he denies having any headaches there is no dizziness no speech difficulty, no motor or sensory symptoms in upper or lower extremities his appetite is good weight has been stable he is able to do his ADLs mood is stable sleep is good no hallucinations no history of seizures he is under constant supervision lives with his wife and his wife has a power of  no other complaints,    Vitals:    10/20/23 1239   BP: 112/60   BP Location: Left arm   Patient Position: Sitting   Cuff Size: Large   Pulse: 68   SpO2: 94%   Weight: 71.8 kg (158 lb 6.4 oz)   Height: 5' 10" (1.778 m)       Current Medications    Current Outpatient Medications:     Cholecalciferol (VITAMIN D-3 PO), Take by mouth in the morning, Disp: , Rfl:     donepezil (ARICEPT) 10 mg tablet, TAKE 1 TABLET BY MOUTH DAILY AT BEDTIME, Disp: 90 tablet, Rfl: 2    memantine (NAMENDA) 10 mg tablet, Take 1 tablet (10 mg total) by mouth 2 (two) times a day, Disp: 180 tablet, Rfl: 2    Multiple Vitamin (multivitamin) capsule, Take 1 capsule by mouth daily, Disp: , Rfl:     Omega-3 Fatty Acids (FISH OIL PO), Take by mouth in the morning, Disp: , Rfl:     TURMERIC PO, Take by mouth in the morning, Disp: , Rfl:     LORazepam (ATIVAN) 1 mg tablet, 1Tablet half an hour prior to the MRI (Patient not taking: Reported on 10/20/2023), Disp: 1 tablet, Rfl: 0    naproxen (NAPROSYN) 500 mg tablet, Take 1 tablet (500 mg total) by mouth 2 (two) times a day as needed for mild pain (take with food) for up to 20 doses, Disp: 20 tablet, Rfl: 0    oxyCODONE (ROXICODONE) 5 mg immediate release tablet, Take 1 tablet (5 mg total) by mouth every 4 (four) hours as needed for moderate pain for up to 5 doses Max Daily Amount: 30 mg, Disp: 5 tablet, Rfl: 0      Allergies  Patient has no known allergies. Past Medical History  History reviewed. No pertinent past medical history. Past Surgical History:  History reviewed. No pertinent surgical history. Family History:  History reviewed. No pertinent family history. Social History:   reports that he has quit smoking. His smoking use included cigarettes. He smoked an average of 1 pack per day. He has never used smokeless tobacco. He reports that he does not drink alcohol and does not use drugs. I have reviewed the past medical history, surgical history, social and family history, current medications, allergies vitals, review of systems, and updated this information as appropriate today. Objective:    Physical Exam    Neurological Exam     GENERAL:  Cooperative in no acute distress. Well-developed and well-nourished     HEAD and NECK   Head is atraumatic normocephalic with no lesions or masses. Neck is supple with full range of motion     CARDIOVASCULAR  Carotid Arteries-no carotid bruits.      NEUROLOGIC:  Mental Status-the patient is awake alert and oriented without aphasia or apraxia, patient follows simple commands unable to do complex commands, last Banner was 6/30  Cranial Nerves: Visual fields are full to confrontation. Extraocular movements are full without nystagmus. Pupils are 2-1/2 mm and reactive. Face is symmetrical to light touch. Movements of facial expression move symmetrically. Hearing is normal to finger rub bilaterally. Soft palate lifts symmetrically. Shoulder shrug is symmetrical. Tongue is midline without atrophy. Motor: No drift is noted on arm extension. Strength is full in the upper and lower extremities with normal bulk and tone. Gait is unremarkable. ROS:  Review of Systems   Constitutional:  Negative for appetite change, fatigue and fever. HENT: Negative. Negative for hearing loss, tinnitus, trouble swallowing and voice change. Eyes: Negative. Negative for photophobia, pain and visual disturbance. Respiratory: Negative. Negative for shortness of breath. Cardiovascular: Negative. Negative for palpitations. Gastrointestinal: Negative. Negative for nausea and vomiting. Endocrine: Negative. Negative for cold intolerance. Genitourinary: Negative. Negative for dysuria, frequency and urgency. Musculoskeletal:  Negative for back pain, gait problem, myalgias and neck pain. Skin: Negative. Negative for rash. Allergic/Immunologic: Negative. Neurological: Negative. Negative for dizziness, tremors, seizures, syncope, facial asymmetry, speech difficulty, weakness, light-headedness, numbness and headaches. Hematological: Negative. Does not bruise/bleed easily. Psychiatric/Behavioral:  Positive for confusion. Negative for hallucinations and sleep disturbance.

## 2023-11-06 DIAGNOSIS — G30.0 EARLY ONSET ALZHEIMER'S DEMENTIA WITHOUT BEHAVIORAL DISTURBANCE (HCC): ICD-10-CM

## 2023-11-06 DIAGNOSIS — F02.80 EARLY ONSET ALZHEIMER'S DEMENTIA WITHOUT BEHAVIORAL DISTURBANCE (HCC): ICD-10-CM

## 2023-11-06 RX ORDER — MEMANTINE HYDROCHLORIDE 10 MG/1
10 TABLET ORAL 2 TIMES DAILY
Qty: 180 TABLET | Refills: 2 | Status: SHIPPED | OUTPATIENT
Start: 2023-11-06

## 2023-11-07 NOTE — TELEPHONE ENCOUNTER
Pt's wife left a voice mail on 11/6/23 regarding this refill request. A refill was sent to the pharmacy yesterday. Called her back and made her aware.

## 2024-04-17 ENCOUNTER — TELEPHONE (OUTPATIENT)
Dept: NEUROLOGY | Facility: CLINIC | Age: 69
End: 2024-04-17

## 2024-04-17 NOTE — TELEPHONE ENCOUNTER
Spoke with patients wife to reschedule 5/24 appointment as provider will not be in the office. I offered tomorrow 4/18 @ 2PM and she accepted.

## 2024-04-18 ENCOUNTER — OFFICE VISIT (OUTPATIENT)
Dept: NEUROLOGY | Facility: CLINIC | Age: 69
End: 2024-04-18
Payer: COMMERCIAL

## 2024-04-18 VITALS
HEIGHT: 70 IN | HEART RATE: 70 BPM | WEIGHT: 155.2 LBS | DIASTOLIC BLOOD PRESSURE: 70 MMHG | SYSTOLIC BLOOD PRESSURE: 110 MMHG | BODY MASS INDEX: 22.22 KG/M2

## 2024-04-18 DIAGNOSIS — G30.0 EARLY ONSET ALZHEIMER'S DEMENTIA WITHOUT BEHAVIORAL DISTURBANCE (HCC): Primary | ICD-10-CM

## 2024-04-18 DIAGNOSIS — F02.80 EARLY ONSET ALZHEIMER'S DEMENTIA WITHOUT BEHAVIORAL DISTURBANCE (HCC): Primary | ICD-10-CM

## 2024-04-18 PROCEDURE — 99214 OFFICE O/P EST MOD 30 MIN: CPT | Performed by: PSYCHIATRY & NEUROLOGY

## 2024-04-18 RX ORDER — MEMANTINE HYDROCHLORIDE 10 MG/1
10 TABLET ORAL 2 TIMES DAILY
Qty: 180 TABLET | Refills: 2 | Status: SHIPPED | OUTPATIENT
Start: 2024-04-18

## 2024-04-18 RX ORDER — DONEPEZIL HYDROCHLORIDE 10 MG/1
10 TABLET, FILM COATED ORAL
Qty: 90 TABLET | Refills: 2 | Status: SHIPPED | OUTPATIENT
Start: 2024-04-18

## 2024-04-18 NOTE — PROGRESS NOTES
Jadon Ann is a 69 y.o. male.   Chief Complaint   Patient presents with    Early onset Alzheimer's dementia without behavioral disturb       Assessment:  1. Early onset Alzheimer's dementia without behavioral disturbance (HCC)         Plan:  Patient has an appointment to be seen at [facility] and was advised by them to have a repeat MRI scan of the brain, will order an MRI of the brain neuro quant without contrast patient's wife to call me after the test to discuss the results.    He was advised to continue with Aricept and Namenda and refills were given.    He was advised to go for cognitive therapy and to remain physically and mentally active and to be under constant supervision and to eat a healthy nutritious diet and to take multivitamin every day.    He was advised to follow-up with an audiologist to check his hearing and also to follow-up with his ophthalmologist.    To go to the hospital if has any worsening symptoms and call me otherwise to see me back in 6 months or sooner if needed and follow-up with the other physicians.          Subjective:    HPI     Patient is here in follow-up for dementia, accompanied with his wife since the last visit according to the wife he is about the same he is able to do his stuff at home taking a shower needs little help with his dressing, he is tolerating the Aricept and Namenda well denies having any denies having any side effects with no headaches no dizziness no speech difficulty no focal weakness he has not had any falls his appetite is good weight has been stable his mood is good sleep is good he had a cataract surgery done for his left eye he does not drive, he has an upcoming appointment in the next several months to be seen at [facility] for a second opinion regarding his dementia, no other complaints.,  Vitals:    04/18/24 1317   BP: 110/70   BP Location: Right arm   Patient Position: Sitting   Cuff Size: Large   Pulse: 70   Weight: 70.4 kg (155 lb 3.2  "oz)   Height: 5' 10\" (1.778 m)       Current Medications    Current Outpatient Medications:     Cholecalciferol (VITAMIN D-3 PO), Take by mouth in the morning, Disp: , Rfl:     donepezil (ARICEPT) 10 mg tablet, TAKE 1 TABLET BY MOUTH DAILY AT BEDTIME, Disp: 90 tablet, Rfl: 2    memantine (NAMENDA) 10 mg tablet, TAKE 1 TABLET BY MOUTH TWICE A DAY, Disp: 180 tablet, Rfl: 2    Multiple Vitamin (multivitamin) capsule, Take 1 capsule by mouth daily, Disp: , Rfl:     Omega-3 Fatty Acids (FISH OIL PO), Take by mouth in the morning, Disp: , Rfl:     TURMERIC PO, Take by mouth in the morning, Disp: , Rfl:     LORazepam (ATIVAN) 1 mg tablet, 1Tablet half an hour prior to the MRI (Patient not taking: Reported on 10/20/2023), Disp: 1 tablet, Rfl: 0    naproxen (NAPROSYN) 500 mg tablet, Take 1 tablet (500 mg total) by mouth 2 (two) times a day as needed for mild pain (take with food) for up to 20 doses (Patient not taking: Reported on 4/18/2024), Disp: 20 tablet, Rfl: 0    oxyCODONE (ROXICODONE) 5 mg immediate release tablet, Take 1 tablet (5 mg total) by mouth every 4 (four) hours as needed for moderate pain for up to 5 doses Max Daily Amount: 30 mg (Patient not taking: Reported on 4/18/2024), Disp: 5 tablet, Rfl: 0      Allergies  Patient has no known allergies.    Past Medical History  No past medical history on file.      Past Surgical History:  No past surgical history on file.      Family History:  No family history on file.    Social History:   reports that he has quit smoking. His smoking use included cigarettes. He has never used smokeless tobacco. He reports that he does not drink alcohol and does not use drugs.    I have reviewed the past medical history, surgical history, social and family history, current medications, allergies vitals, review of systems, and updated this information as appropriate today.   Objective:    Physical Exam    Neurological Exam     GENERAL:  Cooperative in no acute distress. Well-developed " and well-nourished     HEAD and NECK   Head is atraumatic normocephalic with no lesions or masses. Neck is supple with full range of motion     CARDIOVASCULAR  Carotid Arteries-no carotid bruits.     NEUROLOGIC:  Mental Status-the patient is awake alert and oriented without aphasia or apraxia patient having difficulty with telling me his children's names able to follow simple commands unable to do any complex commands unable to tell me the month or year.  Cranial Nerves:  Patient's eye exam is a little limited questionable left sided field cut. Extraocular movements are full without nystagmus. Pupils are 2-1/2 mm and reactive. Face is symmetrical to light touch. Movements of facial expression move symmetrically. Hearing is normal to finger rub bilaterally. Soft palate lifts symmetrically. Shoulder shrug is symmetrical. Tongue is midline without atrophy.  Motor: No drift is noted on arm extension. Strength is full in the upper and lower extremities with normal bulk and tone.  Gait is unremarkable  ROS:  Review of Systems   Constitutional:  Negative for appetite change, fatigue and fever.   HENT: Negative.  Negative for hearing loss, tinnitus, trouble swallowing and voice change.    Eyes: Negative.  Negative for photophobia, pain and visual disturbance.   Respiratory: Negative.  Negative for shortness of breath.    Cardiovascular: Negative.  Negative for palpitations.   Gastrointestinal: Negative.  Negative for nausea and vomiting.   Endocrine: Negative.  Negative for cold intolerance.   Genitourinary: Negative.  Negative for dysuria, frequency and urgency.   Musculoskeletal:  Negative for back pain, gait problem, myalgias, neck pain and neck stiffness.   Skin: Negative.  Negative for rash.   Allergic/Immunologic: Negative.    Neurological: Negative.  Negative for dizziness, tremors, seizures, syncope, facial asymmetry, speech difficulty, weakness, light-headedness, numbness and headaches.   Hematological: Negative.   Does not bruise/bleed easily.   Psychiatric/Behavioral:  Positive for confusion. Negative for hallucinations and sleep disturbance.

## 2024-07-08 ENCOUNTER — EVALUATION (OUTPATIENT)
Age: 69
End: 2024-07-08
Payer: COMMERCIAL

## 2024-07-08 DIAGNOSIS — G30.0 EARLY ONSET ALZHEIMER'S DEMENTIA WITHOUT BEHAVIORAL DISTURBANCE (HCC): ICD-10-CM

## 2024-07-08 DIAGNOSIS — R48.8 OTHER SYMBOLIC DYSFUNCTIONS: Primary | ICD-10-CM

## 2024-07-08 DIAGNOSIS — F02.80 EARLY ONSET ALZHEIMER'S DEMENTIA WITHOUT BEHAVIORAL DISTURBANCE (HCC): ICD-10-CM

## 2024-07-08 PROCEDURE — 96125 COGNITIVE TEST BY HC PRO: CPT

## 2024-07-08 NOTE — PROGRESS NOTES
Speech-Language Pathology Initial Evaluation    Today's date: 2024   Patient’s name: Jadon Ann  : 1955  MRN: [id number]  Safety measures: Alzheimer's  Referring provider: Abran Elizalde MD    Encounter Diagnosis     ICD-10-CM    1. Other symbolic dysfunctions  R48.8       2. Early onset Alzheimer's dementia without behavioral disturbance (HCC)  G30.0 Ambulatory Referral to Occupational Therapy    F02.80           Assessment:  Patient presents with a moderately severe cognitive-linguistic impairment c/b deficits with orientation (personal information, time, place), attention, memory, organization of thoughts, planning, sequencing, problem solving, following basic and multi-step directions, verbal fluency, and limited vocal output during today's evaluation. Patient was administered the SLUMS and obtained a total score of 3 out of a possible 30, which is suggestive of dementia . Patient required max cues to increase comprehension of expectations of task and follow basic directions. Patient's spouse reported he has a strong support system at home and would benefit from skilled ST 2/2 not receiving skilled ST services in the past. At this time, the pt was unable to recall important information during the patient interview, including age, address, day/date. Patient's spouse reports he needs mod assist with ADLs at home; he is not demonstrating major safety concerns at this time. Patient would benefit from outpatient skilled Speech Therapy services to increase functional recall with external supports, improved orientation with external supports, successful completion of daily tasks with cues, follow directions within functional activities, support positive communication interactions with both familiar and unfamiliar listeners, promote safety, facilitate overall improved quality of life, reduce caregiver burden, and complete caregiver education/training.      Short Term Goals  1. Patient and  "family/caregiver will be educated on dementia and related topics for improved prognosis of care, to be achieved in 4-6 weeks.   2. Patient will participate in continued therapeutic trials with 80% accuracy for continued assessment and recommendations for carryover and functional tasks at home, to be achieved in 4-6 weeks.  3. Patient will improve long term memory retrieval and recall of information during reminiscing tasks for creation of memory book with 80% accuracy, to be achieved in 4-6 weeks.   4. Patient and family will participate in creation and use of memory book with carry over in 90% of opportunities to facilitate improved overall function and quality of life, to be achieved in 4-6 weeks.    Long Term Goals  1. Patient will improve functional cognitive-linguistic skills with the implementation of cues and external aids, by discharge.   2. Patient will demonstrate adequate memory/reasoning/judgment to perform desired activities in a supervised environment by discharge.   3. Patient's family/caregiver will demonstrate implementation of memory book/aides into daily activities to assist with ADLS and improve quality of life.       Plan:  Patient would benefit from outpatient skilled Speech Therapy services: Cognitive-linguistic therapy    Frequency: 1-4x/weekly  Duration: 3 months    Intervention certification from: 7/8/2024  Intervention certification to: 10/8/2024      Subjective:  History of present illness: Patient is a 69 y.o. male who was referred to outpatient skilled Speech Therapy services for a cognitive-linguistic evaluation. PMH significant for Alzheimer's disease. No other pertinent medical hx reported.     Patient's goal(s): \"I don't know.\" As per patient's wife, \"to keep him the way it is\".     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 It is used to detect dementia and mild neurocognitive disorder (MNCD). During today’s administration of the test, pt achieved a total score of 3/30 points, which is suggestive of \"dementia\".             Visit Tracking:  POC expires Unit limit Auth Expiration date PT/OT + Visit Limit?   10/8/24 N/A  NO COG CODES 4/18/25 BOMN                           Visit/Unit Tracking  AUTH Status:  Date 7/8  IE              No Used 1               Remaining  0              Progress Update: 8/8/24              Intervention Comments:  - 45 minutes cognitive-linguistic evaluation    "

## 2024-07-17 ENCOUNTER — OFFICE VISIT (OUTPATIENT)
Age: 69
End: 2024-07-17
Payer: COMMERCIAL

## 2024-07-17 DIAGNOSIS — R48.8 OTHER SYMBOLIC DYSFUNCTIONS: Primary | ICD-10-CM

## 2024-07-17 DIAGNOSIS — G30.0 EARLY ONSET ALZHEIMER'S DEMENTIA WITHOUT BEHAVIORAL DISTURBANCE (HCC): ICD-10-CM

## 2024-07-17 DIAGNOSIS — F02.80 EARLY ONSET ALZHEIMER'S DEMENTIA WITHOUT BEHAVIORAL DISTURBANCE (HCC): ICD-10-CM

## 2024-07-17 PROCEDURE — 92507 TX SP LANG VOICE COMM INDIV: CPT

## 2024-07-17 NOTE — PROGRESS NOTES
Daily Speech Treatment Note    Today's date: 2024   Patient’s name: Jadon Ann  : 1955  MRN: [id number]  Safety measures: Alzheimer's   Referring provider: Abran Elizalde MD    Encounter Diagnosis     ICD-10-CM    1. Other symbolic dysfunctions  R48.8       2. Early onset Alzheimer's dementia without behavioral disturbance (HCC)  G30.0     F02.80           Visit Trackin  POC expires Unit limit Auth Expiration date PT/OT + Visit Limit?   10/8/24 N/A  NO COG CODES 25 BOMN                                           Visit/Unit Tracking  AUTH Status:  Date   IE                         No Used 1  2                         Remaining  0  8                          Subjective/Behavioral:  -Patient pleasant, engaged, and cooperative. Patient's wife in attendance for today's session. No new concerns reported for today's session. Patient's wife reported they will be attending a musical performance/show this weekend.     Objective/Assessment:  -Patient's family member/caregiver was present during today's session.  -Reviewed testing results and goals in plan care with patient. Patient is in agreement at this time.Patient eating and bathing unassisted at home; mod assist with dressing. Patient observed sitting with open mouth posture when not engaged directly.     Short-term goals:  1. Patient and family/caregiver will be educated on dementia and related topics for improved prognosis of care, to be achieved in 4-6 weeks.   24: Patient and spouse discussed dx of dementia/Alzheimer's and manifestation of symptoms dependant on stage of disease progression. Patient's spouse expressed verbal comprehension. No response noted from pt. Patient's wife recommended to inform SLP of any safety concerns that may be observed at home, if any become present. Wife denied safety concerns at this time.   2. Patient will participate in continued therapeutic trials with 80% accuracy for continued assessment  and recommendations for carryover and functional tasks at home, to be achieved in 4-6 weeks.  3. Patient will improve long term memory retrieval and recall of information during reminiscing tasks for creation of memory book with 80% accuracy, to be achieved in 4-6 weeks.   7/17/24: Interviewed patient about his likes regarding music and sports. Patient reported liking Lan Boyle and [employer], [employer] football fan.  Attempted to elicit LTM retrieval with lead-in questions regarding childhood, early marriage and children, work, traveling. Patient required max cues and prompting from his spouse to elicit responses.   4. Patient and family will participate in creation and use of memory book with carry over in 90% of opportunities to facilitate improved overall function and quality of life, to be achieved in 4-6 weeks.  7/17/24: Educated patient and support re: purpose/rationale of memory book and projected process of creation. Patient's spouse recommended to bring in a few pictures of immediate family, significant life events (e.g. marriage, graduation, etc.), or pictures from vacation to begin creation of memory book next session. Patient's wife expressed verbal comprehension; reciprocal agreement noted.      Long Term Goals  1. Patient will improve functional cognitive-linguistic skills with the implementation of cues and external aids, by discharge.   2. Patient will demonstrate adequate memory/reasoning/judgment to perform desired activities in a supervised environment by discharge.   3. Patient's family/caregiver will demonstrate implementation of memory book/aides into daily activities to assist with ADLS and improve quality of life.     Plan:  -Patient was provided with home exercises/activities to target goals in plan of care at the end of today's session.  -Continue with current plan of care.

## 2024-07-18 ENCOUNTER — OFFICE VISIT (OUTPATIENT)
Age: 69
End: 2024-07-18
Payer: COMMERCIAL

## 2024-07-18 DIAGNOSIS — G30.0 EARLY ONSET ALZHEIMER'S DEMENTIA WITHOUT BEHAVIORAL DISTURBANCE (HCC): ICD-10-CM

## 2024-07-18 DIAGNOSIS — F02.80 EARLY ONSET ALZHEIMER'S DEMENTIA WITHOUT BEHAVIORAL DISTURBANCE (HCC): ICD-10-CM

## 2024-07-18 DIAGNOSIS — R48.8 OTHER SYMBOLIC DYSFUNCTIONS: Primary | ICD-10-CM

## 2024-07-18 PROCEDURE — 92507 TX SP LANG VOICE COMM INDIV: CPT

## 2024-07-18 NOTE — PROGRESS NOTES
"Daily Speech Treatment Note    Today's date: 2024   Patient’s name: Jadon Ann  : 1955  MRN: [id number]  Safety measures: Alzheimer's   Referring provider: Abran Elizalde MD    Encounter Diagnosis     ICD-10-CM    1. Other symbolic dysfunctions  R48.8       2. Early onset Alzheimer's dementia without behavioral disturbance (HCC)  G30.0     F02.80             Visit Tracking: 3  POC expires Unit limit Auth Expiration date PT/OT + Visit Limit?   10/8/24 N/A  NO COG CODES 25 BOMN                                           Visit/Unit Tracking  AUTH Status:  Date   IE                       No Used 1  2  3                       Remaining  0  8  7                        Subjective/Behavioral:  -Patient pleasant, engaged, and cooperative. Patient's wife in attendance for today's session. No new concerns reported for today's session.     Objective/Assessment: Patient demonstrated visual inattention to the entire field/array of cards/puzzle pieces and reduced comprehension with handling jigsaw puzzle pieces. Max cues including hand-over-hand assist to engage in therapeutic trials today. Patient's wife reported patient was see by opthalmology and patient's visual acuity is WNL - no issues reported.     Short-term goals:  1. Patient and family/caregiver will be educated on dementia and related topics for improved prognosis of care, to be achieved in 4-6 weeks.     2. Patient will participate in continued therapeutic trials with 80% accuracy for continued assessment and recommendations for carryover and functional tasks at home, to be achieved in 4-6 weeks.  24: Patient engaged in card game, \"War\", with max cues including direct verbal instruction, immediate, direct verbal feedback, models, and hand-over-hand assist. Patient presented with reduced comprehension with symbol recognition, number sequences, and determining greater value between two numbers. Patient and clinician engaged in " completing a 16 piece jigsaw puzzle, which was too complicated for patient to complete with max cues. Patient required hand-over-hand assist in order to manipulate individual puzzle pieces and connect them.     3. Patient will improve long term memory retrieval and recall of information during reminiscing tasks for creation of memory book with 80% accuracy, to be achieved in 4-6 weeks.     4. Patient and family will participate in creation and use of memory book with carry over in 90% of opportunities to facilitate improved overall function and quality of life, to be achieved in 4-6 weeks.      Plan:  -Patient was provided with home exercises/activities to target goals in plan of care at the end of today's session.  -Continue with current plan of care.

## 2024-07-22 ENCOUNTER — OFFICE VISIT (OUTPATIENT)
Age: 69
End: 2024-07-22
Payer: COMMERCIAL

## 2024-07-22 DIAGNOSIS — F02.80 EARLY ONSET ALZHEIMER'S DEMENTIA WITHOUT BEHAVIORAL DISTURBANCE (HCC): ICD-10-CM

## 2024-07-22 DIAGNOSIS — R48.8 OTHER SYMBOLIC DYSFUNCTIONS: Primary | ICD-10-CM

## 2024-07-22 DIAGNOSIS — G30.0 EARLY ONSET ALZHEIMER'S DEMENTIA WITHOUT BEHAVIORAL DISTURBANCE (HCC): ICD-10-CM

## 2024-07-22 PROCEDURE — 92507 TX SP LANG VOICE COMM INDIV: CPT

## 2024-07-22 NOTE — PROGRESS NOTES
"Daily Speech Treatment Note    Today's date: 2024   Patient’s name: Jadon Ann  : 1955  MRN: [id number]  Safety measures: Alzheimer's   Referring provider: Abran Elizalde MD    Encounter Diagnosis     ICD-10-CM    1. Other symbolic dysfunctions  R48.8       2. Early onset Alzheimer's dementia without behavioral disturbance (HCC)  G30.0     F02.80               Visit Trackin  POC expires Unit limit Auth Expiration date PT/OT + Visit Limit?   10/8/24 N/A  NO COG CODES 25 BOMN                                           Visit/Unit Tracking  AUTH Status:  Date   IE                     No Used 1  2  3  4                     Remaining  0  8  7  6                      Subjective/Behavioral:  -Patient pleasant, engaged, and cooperative. Patient's wife in attendance for today's session. Wife reported patient is \"having an off day\".     Objective/Assessment: Patient demonstrated significant inattention to visual field of two pictures; required consistent verbal reminders to scan two photos; reduced field to one; asked pt to describe picture scene; minimal verbal output without max prompting/models/lead-in sentences.       Short-term goals:  1. Patient and family/caregiver will be educated on dementia and related topics for improved prognosis of care, to be achieved in 4-6 weeks.     2. Patient will participate in continued therapeutic trials with 80% accuracy for continued assessment and recommendations for carryover and functional tasks at home, to be achieved in 4-6 weeks.  24: Patient given a field of two pictures of present progressive verb scenes (e.g. a woman sweeping). Patient correctly identified present progressive verbs from a F.O. 2 with 60% accuracy, IND. Accuracy remained 60% given max cues including models. Attempted to facilitate music tx to stimulate LTM and expressive language via singing lyrics. Patient demonstrated flat affect; reduced " motivation/participation observed.     3. Patient will improve long term memory retrieval and recall of information during reminiscing tasks for creation of memory book with 80% accuracy, to be achieved in 4-6 weeks.   7/22/24: Patient's wife reported she will bring family pictures for creation of memory book in next session.   4. Patient and family will participate in creation and use of memory book with carry over in 90% of opportunities to facilitate improved overall function and quality of life, to be achieved in 4-6 weeks.      Plan:  -Patient was provided with home exercises/activities to target goals in plan of care at the end of today's session.  -Continue with current plan of care.

## 2024-07-24 ENCOUNTER — APPOINTMENT (OUTPATIENT)
Age: 69
End: 2024-07-24
Payer: COMMERCIAL

## 2024-07-25 ENCOUNTER — OFFICE VISIT (OUTPATIENT)
Age: 69
End: 2024-07-25
Payer: COMMERCIAL

## 2024-07-25 DIAGNOSIS — F02.80 EARLY ONSET ALZHEIMER'S DEMENTIA WITHOUT BEHAVIORAL DISTURBANCE (HCC): ICD-10-CM

## 2024-07-25 DIAGNOSIS — G30.0 EARLY ONSET ALZHEIMER'S DEMENTIA WITHOUT BEHAVIORAL DISTURBANCE (HCC): ICD-10-CM

## 2024-07-25 DIAGNOSIS — R48.8 OTHER SYMBOLIC DYSFUNCTIONS: Primary | ICD-10-CM

## 2024-07-25 PROCEDURE — 92507 TX SP LANG VOICE COMM INDIV: CPT

## 2024-07-25 NOTE — PROGRESS NOTES
"Daily Speech Treatment Note    Today's date: 2024   Patient’s name: Jadon Ann  : 1955  MRN: [id number]  Safety measures: Alzheimer's   Referring provider: Abran Elizalde MD    Encounter Diagnosis     ICD-10-CM    1. Other symbolic dysfunctions  R48.8       2. Early onset Alzheimer's dementia without behavioral disturbance (HCC)  G30.0     F02.80             Visit Trackin  POC expires Unit limit Auth Expiration date PT/OT + Visit Limit?   10/8/24 N/A  NO COG CODES 25 BOMN                                           Visit/Unit Tracking  AUTH Status:  Date   IE                   No Used 1  2  3  4  5                   Remaining  0  8  7  6  5                Progress Update: 24    Subjective/Behavioral:  -Patient pleasant and cooperative. Patient's wife in attendance for today's session. No new concerns reported.     Objective/Assessment: Patient's wife brought in pictures from home of family members and special events. Clinician, patient, and spouse engaged in reminiscence therapy to stimulate LTM and expressive language/verbal fluency. Patient required max cues to engage in conversation regarding pictures. Patient demonstrated poor recall for all family members. Patient relayed he was unable to see the objects/people in photos. Patient remains quiet for most of session unless given multiple prompts from his spouse to engage clinician or answer questions. Patient demonstrated reduced comprehension following basic 1-step direction (e.g. \"sit in the chair\").       Short-term goals:  1. Patient and family/caregiver will be educated on dementia and related topics for improved prognosis of care, to be achieved in 4-6 weeks.     2. Patient will participate in continued therapeutic trials with 80% accuracy for continued assessment and recommendations for carryover and functional tasks at home, to be achieved in 4-6 weeks.    3. Patient will improve long term memory " retrieval and recall of information during reminiscing tasks for creation of memory book with 80% accuracy, to be achieved in 4-6 weeks.   7/25/24: Patient's wife brought in family photos. Unsuccessful in stimulating expressive output or LTM with reduced recall for all people/places/events.     4. Patient and family will participate in creation and use of memory book with carry over in 90% of opportunities to facilitate improved overall function and quality of life, to be achieved in 4-6 weeks.  7/25/24: Patient and wife educated on purpose and design of memory book. Copies of example entries given to patient and spouse for rough draft of information they would like to include for future development of own memory book. Patient's spouse expressed verbal comprehension; reciprocal agreement noted.     Plan:  -Patient was provided with home exercises/activities to target goals in plan of care at the end of today's session.  -Continue with current plan of care.

## 2024-07-29 ENCOUNTER — APPOINTMENT (OUTPATIENT)
Age: 69
End: 2024-07-29
Payer: COMMERCIAL

## 2024-08-01 ENCOUNTER — APPOINTMENT (OUTPATIENT)
Age: 69
End: 2024-08-01
Payer: COMMERCIAL

## 2024-08-02 ENCOUNTER — APPOINTMENT (OUTPATIENT)
Age: 69
End: 2024-08-02
Payer: COMMERCIAL

## 2024-08-07 ENCOUNTER — APPOINTMENT (OUTPATIENT)
Age: 69
End: 2024-08-07
Payer: COMMERCIAL

## 2024-08-08 ENCOUNTER — APPOINTMENT (OUTPATIENT)
Age: 69
End: 2024-08-08
Payer: COMMERCIAL

## 2024-08-12 ENCOUNTER — OFFICE VISIT (OUTPATIENT)
Age: 69
End: 2024-08-12
Payer: COMMERCIAL

## 2024-08-12 DIAGNOSIS — R48.8 OTHER SYMBOLIC DYSFUNCTIONS: Primary | ICD-10-CM

## 2024-08-12 DIAGNOSIS — F02.80 EARLY ONSET ALZHEIMER'S DEMENTIA WITHOUT BEHAVIORAL DISTURBANCE (HCC): ICD-10-CM

## 2024-08-12 DIAGNOSIS — G30.0 EARLY ONSET ALZHEIMER'S DEMENTIA WITHOUT BEHAVIORAL DISTURBANCE (HCC): ICD-10-CM

## 2024-08-12 PROCEDURE — 92507 TX SP LANG VOICE COMM INDIV: CPT

## 2024-08-12 NOTE — PROGRESS NOTES
"Daily Speech Treatment Note    Today's date: 2024   Patient’s name: Jadon Ann  : 1955  MRN: [id number]  Safety measures: Alzheimer's   Referring provider: Abran Elizalde MD    Encounter Diagnosis     ICD-10-CM    1. Other symbolic dysfunctions  R48.8       2. Early onset Alzheimer's dementia without behavioral disturbance (HCC)  G30.0     F02.80               Visit Trackin  POC expires Unit limit Auth Expiration date PT/OT + Visit Limit?   10/8/24 N/A  NO COG CODES 25 BOMN                                           Visit/Unit Tracking  AUTH Status:  Date   IE                 No Used 1  2  3  4  5  6                 Remaining  0  8  7  6  5  4              Progress Update: 24    Subjective/Behavioral:  -Patient pleasant and cooperative. Patient's wife in attendance for today's session. As per wife, \"he's slowing down a lot physically\".     Objective/Assessment: Patient demonstrated increased engagement levels today compared to previous sessions; possibly 2/2 motivated by preferred topic of sports. Patient requires max cues including physical assist to follow basic commands (e.g. to sit in chair, to leave room, etc.).      Short-term goals:  1. Patient and family/caregiver will be educated on dementia and related topics for improved prognosis of care, to be achieved in 4-6 weeks.     2. Patient will participate in continued therapeutic trials with 80% accuracy for continued assessment and recommendations for carryover and functional tasks at home, to be achieved in 4-6 weeks.  24: Patient answered Y/N questions re: motivating topic (football) with 60% accuracy, IND. Accuracy remained 60% given max cues including models.   3. Patient will improve long term memory retrieval and recall of information during reminiscing tasks for creation of memory book with 80% accuracy, to be achieved in 4-6 weeks.   24: Attempted to engage in reminiscence task " regarding childhood foods and cultural influences in his family or neighborhood. Patient demonstrated increased difficulty answering open ended and forced choice questions (choice of 2). Patient relied heavily on spouse's feedback to engage in task.    4. Patient and family will participate in creation and use of memory book with carry over in 90% of opportunities to facilitate improved overall function and quality of life, to be achieved in 4-6 weeks.      Plan:  -Patient was provided with home exercises/activities to target goals in plan of care at the end of today's session.  -Continue with current plan of care.

## 2024-08-14 ENCOUNTER — APPOINTMENT (OUTPATIENT)
Age: 69
End: 2024-08-14
Payer: COMMERCIAL

## 2024-08-15 ENCOUNTER — OFFICE VISIT (OUTPATIENT)
Age: 69
End: 2024-08-15
Payer: COMMERCIAL

## 2024-08-15 DIAGNOSIS — G30.0 EARLY ONSET ALZHEIMER'S DEMENTIA WITHOUT BEHAVIORAL DISTURBANCE (HCC): ICD-10-CM

## 2024-08-15 DIAGNOSIS — F02.80 EARLY ONSET ALZHEIMER'S DEMENTIA WITHOUT BEHAVIORAL DISTURBANCE (HCC): ICD-10-CM

## 2024-08-15 DIAGNOSIS — R48.8 OTHER SYMBOLIC DYSFUNCTIONS: Primary | ICD-10-CM

## 2024-08-15 PROCEDURE — 92507 TX SP LANG VOICE COMM INDIV: CPT

## 2024-08-15 NOTE — PROGRESS NOTES
Daily Speech Treatment Note    Today's date: 8/15/2024   Patient’s name: Jadon Ann  : 1955  MRN: [id number]  Safety measures: Alzheimer's   Referring provider: Abran Elizalde MD    Encounter Diagnosis     ICD-10-CM    1. Other symbolic dysfunctions  R48.8       2. Early onset Alzheimer's dementia without behavioral disturbance (HCC)  G30.0     F02.80             Visit Trackin  POC expires Unit limit Auth Expiration date PT/OT + Visit Limit?   10/8/24 N/A  NO COG CODES 25 BOMN                                           Visit/Unit Tracking  AUTH Status:  Date   IE  7/17  7/18  7/22  7/25  8/12 8/15             No Used 1  2  3  4  5  6  7               Remaining  0  8  7  6  5  4  3            Progress Update: 24    Subjective/Behavioral:  -Patient pleasant and cooperative. Patient's wife in attendance for today's session. No new concerns reported.     Objective/Assessment: Discussed frequency recommendation with patient and wife, as current frequency of 3x/week has been difficult for family to maintain with frequent cancellations and no shows to appointments. After several sessions of therapeutic trials, patient demonstrates language skills congruent with later stages of cognitive-linguistic decline which requires that of a maintenance approach versus a restorative one. Recommendations to reduce frequency to 1x/week for ease of scheduling and attendance (given patient and spouse rely on transportation from their son) were discussed; patient and wife expressed verbal comprehension and reciprocal agreement noted.       Short-term goals:  1. Patient and family/caregiver will be educated on dementia and related topics for improved prognosis of care, to be achieved in 4-6 weeks.   8/15/24: Discussed purpose and rationale of skilled intervention with cognitive-linguistic skills of those living with dementia versus maintenance program and care. Educated patient's wife of local senior centers  "that provide community members with opportunities to socialize with same-aged/like peers and participate in various programs and events (e.g. chair yoga, \"BINGO\", etc.). Wife expressed she has already contacted a local center in [place] ([facility]) and has submitted the necessary paperwork, and will f/u with them to determine next steps for attending.   2. Patient will participate in continued therapeutic trials with 80% accuracy for continued assessment and recommendations for carryover and functional tasks at home, to be achieved in 4-6 weeks.  8/12/24: Administered therapeutic trials to determine patient's divergent naming skills. Patient IND stated 3 members to a given concrete category with 0% accuracy. Accuracy increased 75% given max cues including semantic cues, initial phoneme, initial letter, and models, administered in a least-to-most prompt hierarchy. Patient demonstrated significant difficulty with basic concrete categories (e.g. fruit, vegetables, desserts) as well as highly motivating ones (e.g. names of football teams)  3. Patient will improve long term memory retrieval and recall of information during reminiscing tasks for creation of memory book with 80% accuracy, to be achieved in 4-6 weeks.   4. Patient and family will participate in creation and use of memory book with carry over in 90% of opportunities to facilitate improved overall function and quality of life, to be achieved in 4-6 weeks.  8/15/24: Patient and spouse verbally expressed desire to go through with development of memory book. Wife reported she is currently awaiting childhood photos from family to incorporate into book. Patient wife stated she will bring as soon as she receives them.       Plan:  -Patient was provided with home exercises/activities to target goals in plan of care at the end of today's session.  -Continue with current plan of care.    "

## 2024-08-19 ENCOUNTER — APPOINTMENT (OUTPATIENT)
Age: 69
End: 2024-08-19
Payer: COMMERCIAL

## 2024-08-21 ENCOUNTER — APPOINTMENT (OUTPATIENT)
Age: 69
End: 2024-08-21
Payer: COMMERCIAL

## 2024-08-22 ENCOUNTER — EVALUATION (OUTPATIENT)
Age: 69
End: 2024-08-22
Payer: COMMERCIAL

## 2024-08-22 DIAGNOSIS — F02.80 EARLY ONSET ALZHEIMER'S DEMENTIA WITHOUT BEHAVIORAL DISTURBANCE (HCC): ICD-10-CM

## 2024-08-22 DIAGNOSIS — R48.8 OTHER SYMBOLIC DYSFUNCTIONS: Primary | ICD-10-CM

## 2024-08-22 DIAGNOSIS — G30.0 EARLY ONSET ALZHEIMER'S DEMENTIA WITHOUT BEHAVIORAL DISTURBANCE (HCC): ICD-10-CM

## 2024-08-22 PROCEDURE — 92507 TX SP LANG VOICE COMM INDIV: CPT

## 2024-08-22 NOTE — PROGRESS NOTES
"Speech-Language Pathology Progress Update    Today's date: 2024   Patient’s name: Jadon Ann  : 1955  MRN: [id number]  Safety measures: Alzheimer's   Referring provider: Abran Elizalde MD    Encounter Diagnosis     ICD-10-CM    1. Other symbolic dysfunctions  R48.8       2. Early onset Alzheimer's dementia without behavioral disturbance (HCC)  G30.0     F02.80           Assessment:   Patient presents with progressing moderately-severe/severe cognitive-linguistic impairment c/b deficits with orientation (personal information, time, place), attention, memory, organization of thoughts, planning, sequencing, problem solving, following basic and multi-step directions, verbal fluency, and limited participation in conversation. Patient requires consistent encouragement from clinician and spouse in order to engage socially, or he tends to isolate and shut down d/t communication difficulties. At this time, patient is demonstrating significant cognitive-linguistic impairment during therapeutic trials with impaired semantic fluency, difficulty answering basic y/n questions; difficulty answering questions regarding a picture given a fixed choice of 2.  Patient has presented with increased congestion and \"junky\" cough x2 weeks. Spouse denies any problems with eating and/or drinking at this time. Patient's spouse recommended to monitor for s/sx of aspiration/penetration/dysphagia. Spouse expressed verbal comprehension. Patient also consistently observed exhibiting difficulty recognizing action of sitting in a chair. Patient's wife has requested a script for OP PT to determine pt's current PLOF and address weaknesses as necessary. It is recommended the pt receive a script for PT at this time in order to receive comprehensive care. Patient's wife has been informed that skilled OP PT in the home may be a more appropriate setting to functionally address ADLs. Patient's wife expressed verbal comprehension. " Patient's wife reported patient is also demonstrating difficulty remaining seated during mealtimes. Patient's wife continually educated on different phases/stages of the disease process and has been provided handouts outlining strategies that can be employed to decrease distractions and increase appropriate participation in meals. Patient's wife expressed verbal comprehension of information with reciprocal agreement noted. It is suspected that patient would benefit from continuation of outpatient skilled Speech Therapy services to target goals to maintain his current level of cognitive-linguistic functioning. Patient was in agreement with this plan. Goals in patient’s plan of care reflect a shift from a restorative approach to a maintenance approach. Solely, an HEP is not appropriate at this time as he continues to require the guidance and clinical decision making from a skilled clinician. Without the skills of a clinician providing these necessary services, patient’s status may decline (e.g., decreased functional recall, reduced attempts with verbal expression, decreased comprehension, socially withdrawn, reduced quality of life, decreased safety, increased frustration, caregiver burden).       Short Term Goals  1. Patient and family/caregiver will be educated on dementia and related topics for improved prognosis of care, to be achieved in 4-6 weeks. ONGOING  2. Patient will participate in continued therapeutic trials with 80% accuracy for continued assessment and recommendations for carryover and functional tasks at home, to be achieved in 4-6 weeks. ONGOING  3. Patient will improve long term memory retrieval and recall of information during reminiscing tasks for creation of memory book with 80% accuracy, to be achieved in 4-6 weeks. ONGOING  4. Patient and family will participate in creation and use of memory book with carry over in 90% of opportunities to facilitate improved overall function and quality of life,  "to be achieved in 4-6 weeks. ONGOING     Long Term Goals  1. Patient will improve functional cognitive-linguistic skills with the implementation of cues and external aids, by discharge. ONGOING  2. Patient will demonstrate adequate memory/reasoning/judgment to perform desired activities in a supervised environment by discharge. ONGOING  3. Patient's family/caregiver will demonstrate implementation of memory book/aides into daily activities to assist with ADLS and improve quality of life. ONGOING      Plan:  Patient would benefit from outpatient skilled Speech Therapy services: Maintenance therapy program    Frequency: 1x weekly  Duration:  10-12 weeks    Intervention certification from: 8/22/2024  Intervention certification to: 10/8/2024      Subjective:  Patient pleasant and cooperative. Patient's wife in attendance. No new concerns reported.     Patient's goal(s): \"I want it to be straight when I talk.\" As per spouse, \"he wants to be able to communicate better\".       Objective (testing):  No formal testing administered as today's session serves as a progress update.       Treatment:  Patient and wife educated on dysphagia and dementia comorbidity; disease process on swallowing function. Patient and wife provided educational handouts addressing normal swallow vs aspiration swallow, aspiration PNA, aspiration precautions, implementing oral care, guidelines for safe swallowing, and monitoring for s/sx of aspiration/penetration/dysphagia. Patient's wife expressed verbal comprehension with reciprocal agreement noted. Patient's wife instructed to obtain pt's temperature tonight and tomorrow. If patient presents with a low grade fever, patient's wife instructed to go to ER/urgent care to r/o presence/absence of aspiration PNA. Patient's wife expressed verbal agreement with instructions/recommendations. Patient's wife also recommended to f/u with PCP regarding increased congestion and cough x2 weeks. Patient's wife in " agreement.         Visit Trackin  POC expires Unit limit Auth Expiration date PT/OT + Visit Limit?   10/8/24 N/A  NO COG CODES 25 BOMN                                           Visit/Unit Tracking  AUTH Status:  Date   IE  7/17  7/18  7/22  7/25  8/12 8/15  8/22  PU           No Used 1  2  3  4  5  6  7  8             Remaining  0  8  7  6  5  4  3  2                 Intervention comments:  - 45 minutes speech/language tx

## 2024-08-26 ENCOUNTER — TELEPHONE (OUTPATIENT)
Age: 69
End: 2024-08-26

## 2024-08-26 ENCOUNTER — APPOINTMENT (OUTPATIENT)
Age: 69
End: 2024-08-26
Payer: COMMERCIAL

## 2024-08-26 NOTE — TELEPHONE ENCOUNTER
08/26/24    Patient wife Miss. Greer called office and requested if Dr. Elizalde can write patient a letter that states that patient can do Exercise.     Miss. Greer Sated that patient is going to start working out with a person, but they are requesting for him to be cleared with a letter by his Neurologist.    Patient wife also shared that she wants patient to Exercise because she has notice that Patient mobility is slowing down and just working out at home is not the same of having a professional guiding you.       Any questions, please contact Miss. Greer.   Thank You.

## 2024-08-27 DIAGNOSIS — R26.2 AMBULATORY DYSFUNCTION: Primary | ICD-10-CM

## 2024-08-27 NOTE — TELEPHONE ENCOUNTER
Abran Elizalde MD  Neurology Epilepsy Team 1; Delores Thompson MA3 minutes ago (9:52 AM)       Discussed with patient's wife advised patient to go for physical therapy and they can evaluate to see if patient can do exercises I do not think he can do on his own given his history of dementia

## 2024-08-28 ENCOUNTER — APPOINTMENT (OUTPATIENT)
Age: 69
End: 2024-08-28
Payer: COMMERCIAL

## 2024-08-29 ENCOUNTER — OFFICE VISIT (OUTPATIENT)
Age: 69
End: 2024-08-29
Payer: COMMERCIAL

## 2024-08-29 DIAGNOSIS — F02.80 EARLY ONSET ALZHEIMER'S DEMENTIA WITHOUT BEHAVIORAL DISTURBANCE (HCC): ICD-10-CM

## 2024-08-29 DIAGNOSIS — R48.8 OTHER SYMBOLIC DYSFUNCTIONS: Primary | ICD-10-CM

## 2024-08-29 DIAGNOSIS — G30.0 EARLY ONSET ALZHEIMER'S DEMENTIA WITHOUT BEHAVIORAL DISTURBANCE (HCC): ICD-10-CM

## 2024-08-29 PROCEDURE — 92507 TX SP LANG VOICE COMM INDIV: CPT

## 2024-08-29 NOTE — PROGRESS NOTES
Daily Speech Treatment Note  Skilled Maintenance Program    Today's date: 2024   Patient’s name: Jadon Ann  : 1955  MRN: [id number]  Safety measures: Alzheimer's   Referring provider: Abran Elizalde MD    Encounter Diagnosis     ICD-10-CM    1. Other symbolic dysfunctions  R48.8       2. Early onset Alzheimer's dementia without behavioral disturbance (HCC)  G30.0     F02.80           Visit Trackin  POC expires Unit limit Auth Expiration date PT/OT + Visit Limit?   10/8/24 N/A  NO COG CODES 25 BOMN                                           Visit/Unit Tracking  AUTH Status:  Date   IE  7/17  7/18  7/22  7/25  8/12 8/15  8/22  PU           No Used 1  2  3  4  5  6  7  8  1           Remaining  0  8  7  6  5  4  3  2  9               Subjective/Behavioral:  -Patient pleasant and cooperative. Patient's wife in attendance for today's session. Patient's wife reported neurologist called and returned her message regarding started a physical exercise regimen and neurologist recommends attending skilled OP PT. Patient scheduled for PT IE next Wednesday, 24.     Objective/Assessment:  -Patient's family member/caregiver was present during today's session.  -Reviewed testing results and goals in plan care with patient. Patient is in agreement at this time.    Short-term goals:  1. Patient and family/caregiver will be educated on dementia and related topics for improved prognosis of care, to be achieved in 4-6 weeks.   2. Patient will participate in continued therapeutic trials with 80% accuracy for continued assessment and recommendations for carryover and functional tasks at home, to be achieved in 4-6 weeks.   24: Patient followed basic 1-step commands containing body parts with 93% accuracy, IND. Patient answered basic Y/N questions with 80% accuracy, IND. Patient answered questions about a picture scene with forced choice answers (choice of two) with 69% accuracy. Accuracy  increased to 92% given max cues including models. Patient IND named 12/12 months of the year over 1 trial.   3. Patient will improve long term memory retrieval and recall of information during reminiscing tasks for creation of memory book with 80% accuracy, to be achieved in 4-6 weeks.   4. Patient and family will participate in creation and use of memory book with carry over in 90% of opportunities to facilitate improved overall function and quality of life, to be achieved in 4-6 weeks.     Plan:  -Continue with current plan of care.

## 2024-09-05 ENCOUNTER — APPOINTMENT (OUTPATIENT)
Age: 69
End: 2024-09-05
Payer: COMMERCIAL

## 2024-09-12 ENCOUNTER — OFFICE VISIT (OUTPATIENT)
Age: 69
End: 2024-09-12
Payer: COMMERCIAL

## 2024-09-12 DIAGNOSIS — G30.0 EARLY ONSET ALZHEIMER'S DEMENTIA WITHOUT BEHAVIORAL DISTURBANCE (HCC): ICD-10-CM

## 2024-09-12 DIAGNOSIS — F02.80 EARLY ONSET ALZHEIMER'S DEMENTIA WITHOUT BEHAVIORAL DISTURBANCE (HCC): ICD-10-CM

## 2024-09-12 DIAGNOSIS — R48.8 OTHER SYMBOLIC DYSFUNCTIONS: Primary | ICD-10-CM

## 2024-09-12 PROCEDURE — 92507 TX SP LANG VOICE COMM INDIV: CPT

## 2024-09-12 NOTE — PROGRESS NOTES
"Daily Speech Treatment Note  Skilled Maintenance Program    Today's date: 2024   Patient’s name: Jadon Ann  : 1955  MRN: [id number]  Safety measures: Alzheimer's   Referring provider: Abran Elizalde MD    Encounter Diagnosis     ICD-10-CM    1. Other symbolic dysfunctions  R48.8       2. Early onset Alzheimer's dementia without behavioral disturbance (HCC)  G30.0     F02.80             Visit Tracking: 10  POC expires Unit limit Auth Expiration date PT/OT + Visit Limit?   10/8/24 N/A  NO COG CODES 25 BOMN                                           Visit/Unit Tracking  AUTH Status:  Date   IE  7/17  7/18  7/22  7/25  8/12 8/15  8/22  PU         No Used 1  2  3  4  5  6  7  8  1  2         Remaining  0  8  7  6  5  4  3  2  9  8             Subjective/Behavioral:  -Patient pleasant and cooperative. Patient's wife in attendance for today's session.  Patient reported, \"getting slow\". Patient's spouse reported he is slow moving; script is in for skilled OP PT.    Objective/Assessment: Patient demonstrated improved processing speed today while following basic commands and receptively identifying body parts. Patient has PT evaluation in one week; clinician attempting to prepare patient for complex command following that will occur during PT evaluation.       Short-term goals:  1. Patient and family/caregiver will be educated on dementia and related topics for improved prognosis of care, to be achieved in 4-6 weeks.   2. Patient will participate in continued therapeutic trials with 80% accuracy for continued assessment and recommendations for carryover and functional tasks at home, to be achieved in 4-6 weeks.   24: Patient receptively identified body parts with 80% accuracy, IND. Accuracy increased to 100% given max hand-over-hand assistance. Patient then followed basic one-step commands with 47% accuracy, IND. Accuracy increased to 93% given max cues including models and " hand-over-hand physical assist. Patient also imitated gestures modeled by the clinician with 90% accuracy (given max cues/models), in order to supplement verbal expression to facilitate effective communication. Gestures and 1-step commands given as HEP.  3. Patient will improve long term memory retrieval and recall of information during reminiscing tasks for creation of memory book with 80% accuracy, to be achieved in 4-6 weeks.   4. Patient and family will participate in creation and use of memory book with carry over in 90% of opportunities to facilitate improved overall function and quality of life, to be achieved in 4-6 weeks.   9/12/24: Patient's spouse brought in color photos of family members and patient during his childhood and over the course of his lifespan. Pictures to be scanned and uploaded to print for pages for memory book. Patient and spouse expressed verbal comprehension and agreement to scan and upload documents for printing to patient's book.    Plan:  -Patient was provided with home exercises/activities to target goals in plan of care at the end of today's session.  -Continue with current plan of care.

## 2024-09-19 ENCOUNTER — APPOINTMENT (OUTPATIENT)
Age: 69
End: 2024-09-19
Payer: COMMERCIAL

## 2024-09-23 ENCOUNTER — EVALUATION (OUTPATIENT)
Age: 69
End: 2024-09-23
Payer: COMMERCIAL

## 2024-09-23 DIAGNOSIS — R26.2 AMBULATORY DYSFUNCTION: Primary | ICD-10-CM

## 2024-09-23 PROCEDURE — 97530 THERAPEUTIC ACTIVITIES: CPT

## 2024-09-23 PROCEDURE — 97161 PT EVAL LOW COMPLEX 20 MIN: CPT

## 2024-09-23 NOTE — PROGRESS NOTES
PT Evaluation          POC expires Unit limit Auth Expiration date PT/OT + Visit Limit? Co-Insurance   24                                   Visit/Unit Tracking  AUTH Status:  Date                Used                Remaining                           Today's date: 2024  Patient name: Jadon Ann  : 1955  MRN: [id number]  Referring provider: Abran Elizalde MD  Dx:   Encounter Diagnosis     ICD-10-CM    1. Ambulatory dysfunction  R26.2 Ambulatory Referral to Physical Therapy            Assessment  Assessment details: Patient is a 69 y.o. Male who presents to skilled outpatient PT with ambulatory dysfunction. Significant PMHx of dementia. Wife present throughout evaluation, and aided in history intake. Pt currently receiving cognitive therapy. Upon initial evaluation, pt presents with decreased activity tolerance, poor sequencing with activities- improved with one step commands, occasional hand over hand cueing required. Difficult to assess sensation, though pt reports mild tingling in B/L feet. Unable to assess coordination. Gait deviation towards the L with ambulation and dec step length B/L. Per testing, he is below age related norms and is at risk for falls. Significant amount of time spent completing patient and family education on benefits to aerobic exercise, HEP, consistency of therapy, and prognosis. Pt and wife aware and agreeable. Initiated with STSs this session, patient able to complete without UE assist. Continue NV.     Impairments: Abnormal coordination, Abnormal gait, Abnormal muscle tone, Abnormal or restricted ROM, Activity intolerance, Impaired balance, Impaired physical strength, Lacks appropriate HEP, Poor posture, Poor body mechanics, Pain with function, Safety issue, Weight-bearing intolerance, Abnormal movement, Difficulty understanding, Abnormal muscle firing  Understanding of Dx/Px/POC:  Good  Prognosis: Good    Patient verbalized understanding of POC.       Please contact me if you have any questions or recommendations. Thank you for the referral and the opportunity to share in Jadon Ann's care.    Plan  Plan details: Will 1-2x/week  Patient would benefit from: PT Eval, Skilled PT, OT Eval, Skilled OT, Speech Eval, and Skilled Speech Therapy  Planned modality interventions: Biofeedback, Cryotherapy, TENS, Thermotherapy  Planned therapy interventions: Abdominal trunk stabilization, ADL training, Balance, Balance/WB training, Breathing training, Body mechanics training, Coordination, Functional ROM exercises, Gait training, HEP, Joint Mobilization, Manual Therapy, Dior taping, Motor coordination training, Neuromuscular re-education, Patient education, Postural training, Strengthening, Stretching, Therapeutic activities, Therapeutic exercises, Therapeutic training, Transfer training, Activity modification, Work reintegration  Frequency:1- 2x/wk  Duration in weeks: 12  Plan of Care beginning date: 9/23/24  Plan of Care expiration date: 3 months - 12/23/2024  Treatment plan discussed with: Patient and Family    Goals  Short Term Goals (4 weeks):    - Patient will improve time on TUG by 2.9 seconds to facilitate improved safety in all ambulation  - Patient will be independent in basic HEP 2-3 weeks  - Patient will improve 5xSTS score by 2.3 seconds to promote improved LE functional strength needed for ADLs    Long Term Goals (12 weeks):  - Patient will be independent in a comprehensive home exercise program  - Patient will improve gait speed by 0.18 m/s to improve safety with community ambulation  - Patient will improve LERMA by 6 points in order to improve static balance and reduce risk for falls  - Patient will be able to demonstrate HT in gait without veering  - Patient will improve 6 Minute Walk Test score by 190 feet to promote improved cardiovascular endurance  - Patient will report 50%  reduction in near falls in order to improve safety with functional tasks and reduce his risk for falls  - Patient will report going on walks at least 3 days per week to promote independence and improved cardiovascular endurance  - Patient will be able to ascend/descend stairs reciprocally with 1 UE assist to promote independence and safety with ADLs  - Patient will report 50% reduction in near falls when ambulating on uneven terrain    Cut off score  All date taken from [facility] Neuro Section or Rehab Measures    Dejesus/56  MDC: 6 pts  Age Norms:  60-69: M - 55   F - 55  70-79: M - 54   F - 53  80-89: M - 53   F - 50 5xSTS: Amina et al 2010  MDC: 2.3 sec  Age Norms:  60-69: 11.4 sec  70-79: 12.6 sec  80-89: 14.8 sec   TUG  MDC: 4.14 sec  Cut off score:  >13.5 sec community dwelling adults  >32.2 frail elderly  <20 I for basic transfers  >30 dependent on transfers 10 Meter Walk Test: Ahmet et al 2011  MDC: 0.18 m/s  20-29: M - 1.35 m   F - 1.34 m  30-39: M - 1.43 m   F - 1.34 m  40-49: M - 1.43 m   F - 1.39 m  50-59: M - 1.43 m   F - 1.31 m  60-69: M - 1.34 m   F - 1.24 m  70-79: M - 1.26 m   F - 1.13 m  80-89: M - 0.97 m   F - 0.94 m    Household Ambulator < 0.4 m/s  Limited Community Ambulator 0.4 - 0.8 m/s  Community Ambulator 0.8 - 1.2 m/s  Safely cross the street > 1.2 m/s   FGA  MCID: 4 pts  Geriatrics/community < 22/30 fall risk  Geriatrics/community < 20/30 unexplained falls    DGI  MDC: vestibular - 4 pts  MDC: geriatric/community - 3 pts  Falls risk <19/24 mCTSIB  Norm: 20-60 yrs  Eyes open firm: norm sway 0.21-0.48  Eyes closed firm: norm sway 0.48-0.99  Eyes open foam: norm sway 0.38-0.71  Eyes closed foam: norm sway 0.70-2.22   6 Minute Walk Test  MDC: 190.98 ft  MCID: 164 ft    Age Norms  60-69: M - 1876 ft (571.80 m)  F - 1765 ft (537.98 m)  70-79: M - 1729 ft (527.00 m)  F - 1545 ft (470.92 m)  80-89: M - 1368 ft (416.97 m)  F - 1286 ft (391.97 m) ABC: Candi & Haseeb, 2003  <67%  increased risk for falls   Camp-Tammie Monofilaments  Evaluator Size:        Force (grams):          Hand/Dorsal Thresholds:        Plantar Thresholds:  - 1.65                       - 0.008                       - Normal                                 - Normal  - 2.36                       - 0.02                         - Normal                                 - Normal  - 2.44                       - 0.04                         - Normal                                 - Normal  - 2.83                       - 0.07                         - Normal                                 - Normal  - 3.22                       - 0.16                         - Diminished light touch          - Normal  - 3.61                       - 0.40                         - Diminished light touch          - Normal  - 3.84                       - 0.60                         - Diminished protective           - Diminished light touch  - 4.08                       - 1.00                         - Diminished protective           - Diminished light touch  - 4.17                       - 1.40                         - Diminished protective           - Diminished light touch  - 4.31                       - 2.00                         - Diminished protective           - Diminished light touch  - 4.56                       - 4.00                         - Loss of protective sense      - Diminished protective  - 4.74                       - 6.00                         - Loss of protective sense      - Diminished protective  - 4.93                       - 8.00                         - Loss of protective sense      - Diminished protective  - 5.07                       - 10.0                         - Loss of protective sense     - Loss of protective sense  - 5.18                       - 15.0                         - Loss of protective sense     - Loss of protective sense  - 5.46                       - 26.0                         - Loss of  protective sense     - Loss of protective sense  - 5.88                       - 60.0                         - Loss of protective sense     - Loss of protective sense  - 6.10                       - 100                          - Loss of protective sense     - Loss of protective sense  - 6.45                       - 180                          - Loss of protective sense     - Loss of protective sense  - 6.65                       - 300                          - Deep pressure sense only  - Deep pressure sense only         Subjective    History of Present Illness  - Mechanism of injury: has bike at home- interested in going to the gym but is concerned for safety. Has sig PMHx of dementia. Wife present and assisted with history. He requires assist for ADLs and iADLs. Difficulty with sequencing and has FOF.    - Primary AD: none    Patient goal: improve confidence with walking    Pain  - Current pain ratin/10    Social Support  - Steps to enter house: 4 JAMARI   - Stairs in house: 2 story- has first floor set up has FF with HR   - Lives with: wife  - Hand dominance: R    Objective  LE MMT  - R Hip Flexion: 4/5   L Hip Flexion: 4/5  - R Knee Extension: 4/5  L Knee Extension: 4/5  - R Knee Flexion: 4-/5   L Knee Flexion: 4-/5  - R Ankle DF: 4/5   L Ankle DF: 4/5  - R Ankle PF: 4/5   L Ankle PF: 4/5    Sensation  - Light touch: wfl    - Temperature: subjective yes    Coordination  - Heel to Shin: unable to complete  - Alternate Toe Taps: unable to complete    Postural Screen  - Observation: moderate forward head and rounded shoulders      Gait  - Abnormalities: dec step length B/L, with Lateral deviation towrads the L       Outcome Measures Initial Eval          5xSTS 31.61 sec no UE        TUG  - Regular  - Cognitive   25.53 sec        10 meter 0.62 m/s        LERMA         mCTSIB  - FTEO (firm)  - FTEC (firm)  - FTEO (foam)  - FTEC (foam)   30 sec  30 sec  NT        6MWT 800 ft GB        ABC 35.63%                         Precautions: dementia  No past medical history on file.

## 2024-09-25 ENCOUNTER — OFFICE VISIT (OUTPATIENT)
Age: 69
End: 2024-09-25
Payer: COMMERCIAL

## 2024-09-25 DIAGNOSIS — G30.0 EARLY ONSET ALZHEIMER'S DEMENTIA WITHOUT BEHAVIORAL DISTURBANCE (HCC): ICD-10-CM

## 2024-09-25 DIAGNOSIS — R26.2 AMBULATORY DYSFUNCTION: Primary | ICD-10-CM

## 2024-09-25 DIAGNOSIS — R48.8 OTHER SYMBOLIC DYSFUNCTIONS: Primary | ICD-10-CM

## 2024-09-25 DIAGNOSIS — F02.80 EARLY ONSET ALZHEIMER'S DEMENTIA WITHOUT BEHAVIORAL DISTURBANCE (HCC): ICD-10-CM

## 2024-09-25 PROCEDURE — 97112 NEUROMUSCULAR REEDUCATION: CPT

## 2024-09-25 PROCEDURE — 92507 TX SP LANG VOICE COMM INDIV: CPT

## 2024-09-25 NOTE — PROGRESS NOTES
Daily Speech Treatment Note  Skilled Maintenance Program    Today's date: 2024   Patient’s name: Jadon Ann  : 1955  MRN: [id number]  Safety measures: Alzheimer's   Referring provider: Abran Elizalde MD    Encounter Diagnosis     ICD-10-CM    1. Other symbolic dysfunctions  R48.8       2. Early onset Alzheimer's dementia without behavioral disturbance (HCC)  G30.0     F02.80               Visit Tracking:   POC expires Unit limit Auth Expiration date PT/OT + Visit Limit?   10/8/24 N/A  NO COG CODES 25 BOMN                                           Visit/Unit Tracking  AUTH Status:  Date   IE  7/17  7/18  7/22  7/25  8/12 8/15  8/22  PU       No Used 1  2  3  4  5  6  7  8  1  2  3       Remaining  0  8  7  6  5  4  3  2  9  8  7           Subjective/Behavioral:  -Patient pleasant and cooperative. Patient's wife in attendance for today's session. No new concerns reported.     Objective/Assessment: Patient currently being seen for OP skilled PT; PT clinician reported patient required hand-over-hand assist and very simple, one-step commands to follow along in PT sessions. Patient demonstrating difficulty scanning visual field to recognize objects not in his immediate line of sight. Patient receptively identified and labeled colors with 100% accuracy, IND (small field of targets).       Short-term goals:  1. Patient and family/caregiver will be educated on dementia and related topics for improved prognosis of care, to be achieved in 4-6 weeks.   2. Patient will participate in continued therapeutic trials with 80% accuracy for continued assessment and recommendations for carryover and functional tasks at home, to be achieved in 4-6 weeks.   24: Patient completed confrontational naming tasks with real objects with 50% accuracy, IND. Accuracy increased to 83% given max cues including models (least-to-most prompt hierarchy utilized). Patient followed basic one-step commands  with real objects with 46% accuracy, IND. Accuracy increased to 100% given mod-mas cues including direct verbal instruction with immediate direct verbal feedback, verbal shaping of movements, and clinician models.   3. Patient will improve long term memory retrieval and recall of information during reminiscing tasks for creation of memory book with 80% accuracy, to be achieved in 4-6 weeks.   9/25/24: Attempted to engage in LTM reminiscence tasks with pictures of childhood/family. Patient unable to recognize himself as a child, nor recognize immediate family members in these pictures   4. Patient and family will participate in creation and use of memory book with carry over in 90% of opportunities to facilitate improved overall function and quality of life, to be achieved in 4-6 weeks.   9/25/24: Patient's photos were scanned/uploaded into Word document and patient, patient's wife, and clinician began documenting personal information to include in memory book.    Plan:  -Patient was provided with home exercises/activities to target goals in plan of care at the end of today's session.  -Continue with current plan of care.

## 2024-09-25 NOTE — PROGRESS NOTES
Daily Note     Today's date: 2024  Patient name: Jadon Ann  : 1955  MRN: [id number]  Referring provider: Abran Elizalde MD  Dx:   Encounter Diagnosis     ICD-10-CM    1. Ambulatory dysfunction  R26.2                      Subjective: Nothing new since IE.      Objective: See treatment diary below    NuStep 8'     Ankle weights donned 3# ea B/L  Walking 200ft x2  Fwd/bwd P MB 100ft x2  Ambulating wt beach ball 200ft x2   STS 20x with P MB   Speed ambulation 200ft x 2    Assessment: Tolerated treatment well. Max Verbal and tactile cueing required throughout and occasional hand over hand sequencing. Improved sequencing with increased repetition. Pt utilized gait belt at beginning of session then was uncomfortable and perseverated throughout despite cueing until doffed. Patient would benefit from continued PT      Plan: Continue per plan of care.      POC expires Unit limit Auth Expiration date PT/OT + Visit Limit? Co-Insurance   24                                   Visit/Unit Tracking  AUTH Status:  Date                Used                Remaining

## 2024-10-01 ENCOUNTER — OFFICE VISIT (OUTPATIENT)
Age: 69
End: 2024-10-01
Payer: COMMERCIAL

## 2024-10-01 DIAGNOSIS — R26.2 AMBULATORY DYSFUNCTION: Primary | ICD-10-CM

## 2024-10-01 PROCEDURE — 97112 NEUROMUSCULAR REEDUCATION: CPT

## 2024-10-03 ENCOUNTER — OFFICE VISIT (OUTPATIENT)
Age: 69
End: 2024-10-03
Payer: COMMERCIAL

## 2024-10-03 DIAGNOSIS — G30.0 EARLY ONSET ALZHEIMER'S DEMENTIA WITHOUT BEHAVIORAL DISTURBANCE (HCC): ICD-10-CM

## 2024-10-03 DIAGNOSIS — R26.2 AMBULATORY DYSFUNCTION: Primary | ICD-10-CM

## 2024-10-03 DIAGNOSIS — R48.8 OTHER SYMBOLIC DYSFUNCTIONS: Primary | ICD-10-CM

## 2024-10-03 DIAGNOSIS — F02.80 EARLY ONSET ALZHEIMER'S DEMENTIA WITHOUT BEHAVIORAL DISTURBANCE (HCC): ICD-10-CM

## 2024-10-03 PROCEDURE — 92507 TX SP LANG VOICE COMM INDIV: CPT

## 2024-10-03 PROCEDURE — 97112 NEUROMUSCULAR REEDUCATION: CPT

## 2024-10-03 NOTE — PROGRESS NOTES
Daily Note     Today's date: 10/3/2024  Patient name: Jadon Ann  : 1955  MRN: [id number]  Referring provider: Abran Elizalde MD  Dx:   Encounter Diagnosis     ICD-10-CM    1. Ambulatory dysfunction  R26.2                      Subjective: Nothing new since IE.      Objective: See treatment diary below    NuStep 1'    Ankle weights donned 3# ea B/L  Walking 200ft x2  Bouncing ball 300 ft   Kicking ball 8x   Tossing beach ball 10x   Squigz to mirror  Fwd/bwd P MB 100ft x2  Ambulating wth beach ball 200ft x2   STS 20x with P MB - trialed   Speed ambulation 200ft x 2    Assessment: Tolerated treatment well. Continued with Max Verbal and tactile cueing. Patient does well with hand over hand sequencing. Patient was able to kick ball after being shown multiple times Patient was able to pick bean bags off the floor. Patient had difficulty with squigz to mirror. Ended session with bike but deferred after a minute due to patient being frustrated. Patient would benefit from continued PT      Plan: Continue per plan of care.      POC expires Unit limit Auth Expiration date PT/OT + Visit Limit? Co-Insurance   24                                   Visit/Unit Tracking  AUTH Status:  Date                Used                Remaining

## 2024-10-03 NOTE — PROGRESS NOTES
"Daily Speech Treatment Note  Skilled Maintenance Program    Today's date: 10/3/2024   Patient’s name: Jadon Ann  : 1955  MRN: [id number]  Safety measures: Alzheimer's   Referring provider: Abran Elizalde MD    Encounter Diagnosis     ICD-10-CM    1. Other symbolic dysfunctions  R48.8       2. Early onset Alzheimer's dementia without behavioral disturbance (HCC)  G30.0     F02.80             Visit Tracking:   POC expires Unit limit Auth Expiration date PT/OT + Visit Limit?   10/8/24 N/A  NO COG CODES 25 BOMN                                           Visit/Unit Tracking  AUTH Status:  Date   IE  7/17  7/18  7/22  7/25  8/12 8/15  8/22  PU  8/29  9/12  9/25  10/3   No Used 1  2  3  4  5  6  7  8  1  2  3  4     Remaining  0  8  7  6  5  4  3  2  9  8  7  6         Subjective/Behavioral:  -Patient pleasant and cooperative. Patient's wife in attendance for today's session. Patient's wife reported, \"the other day he was off\".      Objective/Assessment: Discussed swallowing PLOF; patient's wife denies any c/o dysphagia/aspiration/penetration. Attempted matching tasks which were deferred 2/2 impairments with visual attention and font size on card.      Short-term goals:  1. Patient and family/caregiver will be educated on dementia and related topics for improved prognosis of care, to be achieved in 4-6 weeks.   2. Patient will participate in continued therapeutic trials with 80% accuracy for continued assessment and recommendations for carryover and functional tasks at home, to be achieved in 4-6 weeks.   10/3/24: Patient and clinician engaged in a card game of \"war\". Patient required max cues to understand expectations of task. Patient IND identified larger sized card (number/face card) with 50% accuracy.  3. Patient will improve long term memory retrieval and recall of information during reminiscing tasks for creation of memory book with 80% accuracy, to be achieved in 4-6 weeks.   10/3/24: " Engaged in LTM reminiscence tasks with questions regarding childhood. Patient demonstrated empty utterances to describe upbringing.  4. Patient and family will participate in creation and use of memory book with carry over in 90% of opportunities to facilitate improved overall function and quality of life, to be achieved in 4-6 weeks.       Plan:  -Patient was provided with home exercises/activities to target goals in plan of care at the end of today's session.  -Continue with current plan of care.

## 2024-10-07 ENCOUNTER — OFFICE VISIT (OUTPATIENT)
Age: 69
End: 2024-10-07
Payer: COMMERCIAL

## 2024-10-07 DIAGNOSIS — G30.0 EARLY ONSET ALZHEIMER'S DEMENTIA WITHOUT BEHAVIORAL DISTURBANCE (HCC): ICD-10-CM

## 2024-10-07 DIAGNOSIS — R48.8 OTHER SYMBOLIC DYSFUNCTIONS: Primary | ICD-10-CM

## 2024-10-07 DIAGNOSIS — F02.80 EARLY ONSET ALZHEIMER'S DEMENTIA WITHOUT BEHAVIORAL DISTURBANCE (HCC): ICD-10-CM

## 2024-10-07 DIAGNOSIS — R26.2 AMBULATORY DYSFUNCTION: Primary | ICD-10-CM

## 2024-10-07 PROCEDURE — 92507 TX SP LANG VOICE COMM INDIV: CPT

## 2024-10-07 PROCEDURE — 97112 NEUROMUSCULAR REEDUCATION: CPT

## 2024-10-07 NOTE — PROGRESS NOTES
"Daily Note     Today's date: 10/7/2024  Patient name: Jadon Ann  : 1955  MRN: [id number]  Referring provider: Abran Elizalde MD  Dx:   Encounter Diagnosis     ICD-10-CM    1. Ambulatory dysfunction  R26.2                      Subjective: Nothing new since IE. Patient arrived with wife who was present throughout entire session.      Objective: See treatment diary below    NuStep 6'  STS with Y MB 2x10    Ankle weights donned 3# ea B/L  Walking 200ft x2  Fwd/bwd P MB 100ft x2  Speed ambulation 200ft x 4  Stair 6\" in // bars up and over 10x  Bouncing ball 300 ft   Kicking ball 8x   Tossing beach ball 20x attempted forward/backward walking  Ambulating wth Y MB 200ft x2   Side stepping HHA B/L 100ft x2  MB  6# and \"slam\" 10x  Hurdles fwd 2 laps       Assessment: Tolerated treatment well. Initiated with NuStep this session which improved overall functioning and tolerance to exercises. One step commands with hand over hand placements and tactile cueing utilized throughout. Continue to progress NV as tolerated. Patient would benefit from continued PT      Plan: Continue per plan of care.      POC expires Unit limit Auth Expiration date PT/OT + Visit Limit? Co-Insurance   24                                   Visit/Unit Tracking  AUTH Status:  Date 9/23 10/7              Used                Remaining                         "

## 2024-10-07 NOTE — PROGRESS NOTES
Daily Speech Treatment Note  Skilled Maintenance Program    Today's date: 10/7/2024   Patient’s name: Jadon Ann  : 1955  MRN: [id number]  Safety measures: Alzheimer's   Referring provider: Abran Elizalde MD    Encounter Diagnosis     ICD-10-CM    1. Other symbolic dysfunctions  R48.8       2. Early onset Alzheimer's dementia without behavioral disturbance (HCC)  G30.0     F02.80               Visit Tracking:   POC expires Unit limit Auth Expiration date PT/OT + Visit Limit?   10/8/24 N/A  NO COG CODES 25 BOMN                                           Visit/Unit Tracking  AUTH Status:  Date   IE  7/17  7/18  7/22  7/25  8/12 8/15  8/22  PU  8/29  9/12  9/25  10/3 10/7    No Used 1  2  3  4  5  6  7  8  1  2  3  4 5      Remaining  0  8  7  6  5  4  3  2  9  8  7  6 5          Subjective/Behavioral:  -Patient pleasant and cooperative. Patient's wife in attendance for today's session. Patient reported he was tired following PT.    Objective/Assessment: Patient demonstrated increase emotional lability (crying) 2/2 frustration caused by communication impairment; suspected to be d/t too challenging of a language task. Played card game to redirect and de-escalate emotions.       Short-term goals:  1. Patient and family/caregiver will be educated on dementia and related topics for improved prognosis of care, to be achieved in 4-6 weeks.   2. Patient will participate in continued therapeutic trials with 80% accuracy for continued assessment and recommendations for carryover and functional tasks at home, to be achieved in 4-6 weeks.   10/7/24: Patient IND labeled common/functional items with 40% accuracy, IND. Accuracy increased to 100% given mod verbal cues including choice of 2 and verbal shaping of response. Patient stated the function correctly in 2/2 opportunities. Patient attempted divergent naming tasks (e.g. name 3 fruits) and experienced increase in emotional lability at this point in the  "task 2/2 too cognitively demanding. Redirected with card game. Patient and clinician engaged in a card game of \"war\". Faded max cues to understand expectations of task to mod verbal reminder of game rules. Patient IND identified larger sized card (number/face card) with 70% accuracy. Accuracy increased to 100% given mod verbal semantic and forced choice of two answers.   3. Patient will improve long term memory retrieval and recall of information during reminiscing tasks for creation of memory book with 80% accuracy, to be achieved in 4-6 weeks.     4. Patient and family will participate in creation and use of memory book with carry over in 90% of opportunities to facilitate improved overall function and quality of life, to be achieved in 4-6 weeks.       Plan:  -Continue with current plan of care.   "

## 2024-10-10 ENCOUNTER — OFFICE VISIT (OUTPATIENT)
Age: 69
End: 2024-10-10
Payer: COMMERCIAL

## 2024-10-10 DIAGNOSIS — R26.2 AMBULATORY DYSFUNCTION: Primary | ICD-10-CM

## 2024-10-10 PROCEDURE — 97112 NEUROMUSCULAR REEDUCATION: CPT

## 2024-10-10 NOTE — PROGRESS NOTES
"Daily Note     Today's date: 10/10/2024  Patient name: Jadon Ann  : 1955  MRN: [id number]  Referring provider: Abran Elizalde MD  Dx:   Encounter Diagnosis     ICD-10-CM    1. Ambulatory dysfunction  R26.2                      Subjective: Nothing new since IE. Patient arrived with wife who was present throughout entire session.      Objective: See treatment diary below    NuStep 6'  STS with Y MB 2x10  Ankle weights donned 3# ea B/L  Walking 200ft x2  Fwd/bwd P MB 100ft x2  Speed ambulation 200ft x 4  Tossing volleyball 20x attempted forward/backward walking  Bean bag  6# and \"toss\" 10x  Agility fwd 3 laps       Assessment: Tolerated treatment well. Continued with NuStep this session at beginning which improved overall functioning and tolerance to exercises. One step commands with hand over hand placements and tactile cueing utilized throughout. Fatigued by end of session. Patient would benefit from continued PT      Plan: Continue per plan of care.      POC expires Unit limit Auth Expiration date PT/OT + Visit Limit? Co-Insurance   24                                   Visit/Unit Tracking  AUTH Status:  Date 9/23 10/7 10/10             Used                Remaining                         "

## 2024-10-14 ENCOUNTER — APPOINTMENT (OUTPATIENT)
Age: 69
End: 2024-10-14
Payer: COMMERCIAL

## 2024-10-14 ENCOUNTER — OFFICE VISIT (OUTPATIENT)
Age: 69
End: 2024-10-14
Payer: COMMERCIAL

## 2024-10-14 DIAGNOSIS — R26.2 AMBULATORY DYSFUNCTION: Primary | ICD-10-CM

## 2024-10-14 PROCEDURE — 97112 NEUROMUSCULAR REEDUCATION: CPT

## 2024-10-14 NOTE — PROGRESS NOTES
"Daily Note     Today's date: 10/14/2024  Patient name: Jadon Ann  : 1955  MRN: [id number]  Referring provider: Abran Elizalde MD  Dx:   Encounter Diagnosis     ICD-10-CM    1. Ambulatory dysfunction  R26.2                      Subjective: Nothing new since IE. Patient arrived with wife who was present throughout entire session.      Objective: See treatment diary below    NuStep 6'  STS with Y MB 2x10  Ankle weights donned 4# ea B/L  Walking 200ft x2  Fwd/bwd P MB 100ft x2  Speed ambulation 200ft x 4  Tossing volleyball 20x attempted forward/backward walking  Bean bag  6# and \"toss\" 10x  Agility fwd 3 laps       Assessment: Tolerated treatment well. Continued with NuStep this session at beginning which improved overall functioning and tolerance to exercises. Patient became fatigued and progressively more resistant towards end of session requiring hand over hand  cueing from wife and PT for simple one step commands. Session ended, patient reports fatigue, denies dizziness, BP WFL. Continue as tolerated NV. Patient would benefit from continued PT      Plan: Continue per plan of care.      POC expires Unit limit Auth Expiration date PT/OT + Visit Limit? Co-Insurance   24                                   Visit/Unit Tracking  AUTH Status:  Date 9/23 10/7 10/10             Used                Remaining                         "

## 2024-10-16 ENCOUNTER — OFFICE VISIT (OUTPATIENT)
Age: 69
End: 2024-10-16
Payer: COMMERCIAL

## 2024-10-16 DIAGNOSIS — R48.8 OTHER SYMBOLIC DYSFUNCTIONS: Primary | ICD-10-CM

## 2024-10-16 DIAGNOSIS — F02.80 EARLY ONSET ALZHEIMER'S DEMENTIA WITHOUT BEHAVIORAL DISTURBANCE (HCC): ICD-10-CM

## 2024-10-16 DIAGNOSIS — G30.0 EARLY ONSET ALZHEIMER'S DEMENTIA WITHOUT BEHAVIORAL DISTURBANCE (HCC): ICD-10-CM

## 2024-10-16 PROCEDURE — 92507 TX SP LANG VOICE COMM INDIV: CPT

## 2024-10-16 NOTE — PROGRESS NOTES
"Daily Speech Treatment Note  Skilled Maintenance Program    Today's date: 10/16/2024   Patient’s name: Jadon Ann  : 1955  MRN: [id number]  Safety measures: Alzheimer's   Referring provider: Abran Elizalde MD    Encounter Diagnosis     ICD-10-CM    1. Other symbolic dysfunctions  R48.8       2. Early onset Alzheimer's dementia without behavioral disturbance (HCC)  G30.0     F02.80                 Visit Tracking:   POC expires Unit limit Auth Expiration date PT/OT + Visit Limit?   10/8/24 N/A  NO COG CODES 25 BOMN                                           Visit/Unit Tracking  AUTH Status:  Date   IE  7/17  7/18  7/22  7/25  8/12 8/15  8/22  PU  8/29  9/12  9/25  10/3 10/7 10/16   No Used 1  2  3  4  5  6  7  8  1  2  3  4 5 6     Remaining  0  8  7  6  5  4  3  2  9  8  7  6 5 4         Subjective/Behavioral:  -Patient pleasant and cooperative. Patient's wife in attendance for today's session. No new concerns reported.     Objective/Assessment: Patient appeared either distressed or sad compared to last time he was seen. Informal interview in discourse to determine if something was bothering pt; patient unable to express emotions/wants/needs.      Short-term goals:  2. Patient will participate in continued therapeutic trials with 80% accuracy for continued assessment and recommendations for carryover and functional tasks at home, to be achieved in 4-6 weeks.   10/16/24: Attempted to engage pt in music tx, which pt politely declined. Patient and clinician engaged in first-time attempt at GARIMA; patient required max cues to participate, with hand-over-hand assist to play same colored cards. Patient IND identified colors with 90% accuracy. Patient unable to follow simple one-step commands, e.g. \" the blue card\" or \"turn the card over\".   3. Patient will improve long term memory retrieval and recall of information during reminiscing tasks for creation of memory book with 80% accuracy, to be " achieved in 4-6 weeks.   10/16/24: Attempted to engage pt in LTM reminiscence tasks; patient unable to comment on fixed questions re: childhood, young adulthood, working, personality throughout life. Patient interjected with empty speech/telegraphic utterances x2.     4. Patient and family will participate in creation and use of memory book with carry over in 90% of opportunities to facilitate improved overall function and quality of life, to be achieved in 4-6 weeks.       Plan:  -Continue with current plan of care.

## 2024-10-17 ENCOUNTER — OFFICE VISIT (OUTPATIENT)
Age: 69
End: 2024-10-17
Payer: COMMERCIAL

## 2024-10-17 DIAGNOSIS — R26.2 AMBULATORY DYSFUNCTION: Primary | ICD-10-CM

## 2024-10-17 PROCEDURE — 97112 NEUROMUSCULAR REEDUCATION: CPT

## 2024-10-17 NOTE — PROGRESS NOTES
Daily Note     Today's date: 10/17/2024  Patient name: Jadon nAn  : 1955  MRN: [id number]  Referring provider: Abran Elizalde MD  Dx:   Encounter Diagnosis     ICD-10-CM    1. Ambulatory dysfunction  R26.2                      Subjective: Nothing new since IE. Patient arrived with wife who was present throughout entire session.      Objective: See treatment diary below    NuStep 7'  STS with Y MB 2x10  Ankle weights donned 4# ea B/L  Walking 200ft x2  Fwd/bwd P MB 100ft x2  Speed ambulation 200ft x 8  Tossing volleyball 20x attempted forward/backward walking  Stepping over SPCs on ground 4 laps      Assessment: Tolerated treatment well. Continued with NuStep this session at beginning which improved overall functioning and tolerance to exercises. With fatigue patient continues to require hand over hand placement for STS and transfers to chair. Patient would benefit from continued PT. NV re evaluation       Plan: Continue per plan of care.      POC expires Unit limit Auth Expiration date PT/OT + Visit Limit? Co-Insurance   24                                   Visit/Unit Tracking  AUTH Status:  Date 9/23 10/7 10/10             Used                Remaining

## 2024-10-21 ENCOUNTER — APPOINTMENT (OUTPATIENT)
Age: 69
End: 2024-10-21
Payer: COMMERCIAL

## 2024-10-24 ENCOUNTER — APPOINTMENT (OUTPATIENT)
Age: 69
End: 2024-10-24
Payer: COMMERCIAL

## 2024-10-28 ENCOUNTER — APPOINTMENT (OUTPATIENT)
Age: 69
End: 2024-10-28
Payer: COMMERCIAL

## 2024-10-31 ENCOUNTER — APPOINTMENT (OUTPATIENT)
Age: 69
End: 2024-10-31
Payer: COMMERCIAL

## 2024-11-04 ENCOUNTER — EVALUATION (OUTPATIENT)
Age: 69
End: 2024-11-04
Payer: COMMERCIAL

## 2024-11-04 ENCOUNTER — TELEPHONE (OUTPATIENT)
Age: 69
End: 2024-11-04

## 2024-11-04 ENCOUNTER — OFFICE VISIT (OUTPATIENT)
Age: 69
End: 2024-11-04
Payer: COMMERCIAL

## 2024-11-04 DIAGNOSIS — G30.0 EARLY ONSET ALZHEIMER'S DEMENTIA WITHOUT BEHAVIORAL DISTURBANCE (HCC): ICD-10-CM

## 2024-11-04 DIAGNOSIS — R26.2 AMBULATORY DYSFUNCTION: Primary | ICD-10-CM

## 2024-11-04 DIAGNOSIS — F02.80 EARLY ONSET ALZHEIMER'S DEMENTIA WITHOUT BEHAVIORAL DISTURBANCE (HCC): ICD-10-CM

## 2024-11-04 DIAGNOSIS — R48.8 OTHER SYMBOLIC DYSFUNCTIONS: Primary | ICD-10-CM

## 2024-11-04 PROCEDURE — 97112 NEUROMUSCULAR REEDUCATION: CPT

## 2024-11-04 PROCEDURE — 92507 TX SP LANG VOICE COMM INDIV: CPT

## 2024-11-04 NOTE — PROGRESS NOTES
PT Re-Evaluation          POC expires Unit limit Auth Expiration date PT/OT + Visit Limit? Co-Insurance   24                                   Visit/Unit Tracking  AUTH Status:  Date                Used                Remaining                           Today's date: 2024  Patient name: Jadon Ann  : 1955  MRN: [id number]  Referring provider: Abran Elizalde MD  Dx:   Encounter Diagnosis     ICD-10-CM    1. Ambulatory dysfunction  R26.2             Assessment  Assessment details: Patient is a 69 y.o. Male who presents to skilled outpatient PT with ambulatory dysfunction. Significant PMHx of dementia. Wife present throughout session. POC complicated by sister passing away. Since IE, patient demo significant improvement in all outcome measures. Continues to be limited by cueing, requiring hand over hand and one step, however able to complete without cueing with increased repetition. Despite improvements, pt continues to present with decreased activity tolerance, poor sequencing with activities- improved with one step commands, occasional hand over hand cueing required. Difficult to assess sensation, though pt reports mild tingling in B/L feet. Unable to assess coordination. Gait deviation towards the L with ambulation and dec step length B/L. He is still at risk for falls, and is below age related norms. He is making progress towards goals. Significant amount of time spent completing patient and family education on benefits to aerobic exercise, HEP, consistency of therapy, and prognosis. Pt and wife aware and agreeable to continue for another month and then reassess.     Impairments: Abnormal coordination, Abnormal gait, Abnormal muscle tone, Abnormal or restricted ROM, Activity intolerance, Impaired balance, Impaired physical strength, Lacks appropriate HEP, Poor posture, Poor body mechanics, Pain with function, Safety  issue, Weight-bearing intolerance, Abnormal movement, Difficulty understanding, Abnormal muscle firing  Understanding of Dx/Px/POC: Good  Prognosis: Good    Patient verbalized understanding of POC.       Please contact me if you have any questions or recommendations. Thank you for the referral and the opportunity to share in Jadon Ann's care.    Plan  Plan details: Will 1-2x/week  Patient would benefit from: PT Eval, Skilled PT, OT Eval, Skilled OT, Speech Eval, and Skilled Speech Therapy  Planned modality interventions: Biofeedback, Cryotherapy, TENS, Thermotherapy  Planned therapy interventions: Abdominal trunk stabilization, ADL training, Balance, Balance/WB training, Breathing training, Body mechanics training, Coordination, Functional ROM exercises, Gait training, HEP, Joint Mobilization, Manual Therapy, Dior taping, Motor coordination training, Neuromuscular re-education, Patient education, Postural training, Strengthening, Stretching, Therapeutic activities, Therapeutic exercises, Therapeutic training, Transfer training, Activity modification, Work reintegration  Frequency:1- 2x/wk  Duration in weeks: 12  Plan of Care beginning date: 9/23/24  Plan of Care expiration date: 3 months - 12/23/24  Treatment plan discussed with: Patient and Family    Goals  Short Term Goals (4 weeks):    - Patient will improve time on TUG by 2.9 seconds to facilitate improved safety in all ambulation  - Patient will be independent in basic HEP 2-3 weeks  - Patient will improve 5xSTS score by 2.3 seconds to promote improved LE functional strength needed for ADLs    Long Term Goals (12 weeks):  - Patient will be independent in a comprehensive home exercise program  - Patient will improve gait speed by 0.18 m/s to improve safety with community ambulation  - Patient will improve LERMA by 6 points in order to improve static balance and reduce risk for falls  - Patient will be able to demonstrate HT in gait without veering  -  Patient will improve 6 Minute Walk Test score by 190 feet to promote improved cardiovascular endurance  - Patient will report 50% reduction in near falls in order to improve safety with functional tasks and reduce his risk for falls  - Patient will report going on walks at least 3 days per week to promote independence and improved cardiovascular endurance  - Patient will be able to ascend/descend stairs reciprocally with 1 UE assist to promote independence and safety with ADLs  - Patient will report 50% reduction in near falls when ambulating on uneven terrain    Cut off score  All date taken from [facility] Neuro Section or Rehab Measures    Dejesus/56  MDC: 6 pts  Age Norms:  60-69: M - 55   F - 55  70-79: M - 54   F - 53  80-89: M - 53   F - 50 5xSTS: Amina et al 2010  MDC: 2.3 sec  Age Norms:  60-69: 11.4 sec  70-79: 12.6 sec  80-89: 14.8 sec   TUG  MDC: 4.14 sec  Cut off score:  >13.5 sec community dwelling adults  >32.2 frail elderly  <20 I for basic transfers  >30 dependent on transfers 10 Meter Walk Test: Ahmet et al 2011  MDC: 0.18 m/s  20-29: M - 1.35 m   F - 1.34 m  30-39: M - 1.43 m   F - 1.34 m  40-49: M - 1.43 m   F - 1.39 m  50-59: M - 1.43 m   F - 1.31 m  60-69: M - 1.34 m   F - 1.24 m  70-79: M - 1.26 m   F - 1.13 m  80-89: M - 0.97 m   F - 0.94 m    Household Ambulator < 0.4 m/s  Limited Community Ambulator 0.4 - 0.8 m/s  Community Ambulator 0.8 - 1.2 m/s  Safely cross the street > 1.2 m/s   FGA  MCID: 4 pts  Geriatrics/community < 22/30 fall risk  Geriatrics/community < 20/30 unexplained falls    DGI  MDC: vestibular - 4 pts  MDC: geriatric/community - 3 pts  Falls risk <19/24 mCTSIB  Norm: 20-60 yrs  Eyes open firm: norm sway 0.21-0.48  Eyes closed firm: norm sway 0.48-0.99  Eyes open foam: norm sway 0.38-0.71  Eyes closed foam: norm sway 0.70-2.22   6 Minute Walk Test  MDC: 190.98 ft  MCID: 164 ft    Age Norms  60-69: M - 1876 ft (571.80 m)  F - 1765 ft (537.98 m)  70-79: M - 1729 ft  (527.00 m)  F - 1545 ft (470.92 m)  80-89: M - 1368 ft (416.97 m)  F - 1286 ft (391.97 m) ABC: Candi & Haseeb, 2003  <67% increased risk for falls   [place] Monofilaments  Evaluator Size:        Force (grams):          Hand/Dorsal Thresholds:        Plantar Thresholds:  - 1.65                       - 0.008                       - Normal                                 - Normal  - 2.36                       - 0.02                         - Normal                                 - Normal  - 2.44                       - 0.04                         - Normal                                 - Normal  - 2.83                       - 0.07                         - Normal                                 - Normal  - 3.22                       - 0.16                         - Diminished light touch          - Normal  - 3.61                       - 0.40                         - Diminished light touch          - Normal  - 3.84                       - 0.60                         - Diminished protective           - Diminished light touch  - 4.08                       - 1.00                         - Diminished protective           - Diminished light touch  - 4.17                       - 1.40                         - Diminished protective           - Diminished light touch  - 4.31                       - 2.00                         - Diminished protective           - Diminished light touch  - 4.56                       - 4.00                         - Loss of protective sense      - Diminished protective  - 4.74                       - 6.00                         - Loss of protective sense      - Diminished protective  - 4.93                       - 8.00                         - Loss of protective sense      - Diminished protective  - 5.07                       - 10.0                         - Loss of protective sense     - Loss of protective sense  - 5.18                       - 15.0                         -  Loss of protective sense     - Loss of protective sense  - 5.46                       - 26.0                         - Loss of protective sense     - Loss of protective sense  - 5.88                       - 60.0                         - Loss of protective sense     - Loss of protective sense  - 6.10                       - 100                          - Loss of protective sense     - Loss of protective sense  - 6.45                       - 180                          - Loss of protective sense     - Loss of protective sense  - 6.65                       - 300                          - Deep pressure sense only  - Deep pressure sense only         Subjective    History of Present Illness  - Mechanism of injury: has bike at home- interested in going to the gym but is concerned for safety. Has sig PMHx of dementia. Wife present and assisted with history. He requires assist for ADLs and iADLs. Difficulty with sequencing and has FOF.    UPDATE 24: Pt reports they missed last week due to personal issues. They are staying busy at home.     - Primary AD: none    Patient goal: improve confidence with walking    Pain  - Current pain ratin/10    Social Support  - Steps to enter house: 4 JAMARI   - Stairs in house: 2 story- has first floor set up has FF with HR   - Lives with: wife  - Hand dominance: R    Objective  LE MMT  - R Hip Flexion: 4/5   L Hip Flexion: 4/5  - R Knee Extension: 4/5  L Knee Extension: 4/5  - R Knee Flexion: 4-/5   L Knee Flexion: 4-/5  - R Ankle DF: 4/5   L Ankle DF: 4/5  - R Ankle PF: 4/5   L Ankle PF: 4/5    Sensation  - Light touch: wfl    - Temperature: subjective yes    Coordination  - Heel to Shin: unable to complete  - Alternate Toe Taps: unable to complete    Postural Screen  - Observation: moderate forward head and rounded shoulders      Gait  - Abnormalities: dec step length B/L, with Lateral deviation towrads the L       Outcome Measures Initial Eval         5xSTS 31.61 sec no  UE 23.91s no UE       TUG  - Regular  - Cognitive   25.53 sec   22.83s        10 meter 0.62 m/s 0.6m/s       LERMA 31/56 42/56       mCTSIB  - FTEO (firm)  - FTEC (firm)  - FTEO (foam)  - FTEC (foam)   30 sec  30 sec  NT   30  30           6MWT 800 ft GB 900ft GB       ABC 35.63%                        Precautions: dementia  No past medical history on file.

## 2024-11-04 NOTE — TELEPHONE ENCOUNTER
11/04/24    Patient Wife Miss. Greer Called office and request to reschedule 11/04/24 appt due that Patient haves and early appt and might not make it on time to his Neurology Appt with Dr. Elizalde.     Appt Rescheduled for 11/05/24 tomorrow at 10:00 AM.      Any questions, please contact Patient Wife.  Thank You.

## 2024-11-04 NOTE — PROGRESS NOTES
Daily Speech Treatment Note  Skilled Maintenance Program    Today's date: 2024   Patient’s name: Jadon Ann  : 1955  MRN: [id number]  Safety measures: Alzheimer's   Referring provider: Abran Elizalde MD    Encounter Diagnosis     ICD-10-CM    1. Other symbolic dysfunctions  R48.8       2. Early onset Alzheimer's dementia without behavioral disturbance (HCC)  G30.0     F02.80             Visit Tracking:   POC expires Unit limit Auth Expiration date PT/OT + Visit Limit?   10/8/24 N/A  NO COG CODES 25 BOMN                                           Visit/Unit Tracking  AUTH Status:  Date    PU  8/29  9/12  9/25  10/3 10/7 10/16 11/4    No Used  8  1  2  3  4 5 6 7      Remaining   2  9  8  7  6 5 4 3          Subjective/Behavioral:  -Patient pleasant and cooperative. Patient's wife in attendance for today's session. Patient's wife reported she believes patient may be having hallucinations (e.g. seeing  dog).  Patient has f/u with neurology tomorrow.     Objective/Assessment: Monitoring for s/sx of dysphagia 2/2 dx of dementia in advanced stages of disease process - patient's wife denies any problems with eating or drinking at this time. Patient demonstrated increased participation in conversations today and was observed smiling and joking around more than in previous sessions. Wife reported consistently completing HEP with identification and labeling of common/functional items around the house.      Short-term goals:  2. Patient will participate in continued therapeutic trials with 80% accuracy for continued assessment and recommendations for carryover and functional tasks at home, to be achieved in 4-6 weeks.   10/16/24: Attempted to engage pt in music tx, which pt politely declined. Patient and clinician engaged in first-time attempt at GARIMA; patient required max cues to participate, with hand-over-hand assist to play same colored cards. Patient IND identified colors with 90%  "accuracy. Patient unable to follow simple one-step commands, e.g. \" the blue card\" or \"turn the card over\".   11/4/24: Patient receptively identified common objects by fx with 0% accuracy, IND. Accuracy increased to 100% given mod verbal cues including fixed choice of two. Given as HEP.     3. Patient will improve long term memory retrieval and recall of information during reminiscing tasks for creation of memory book with 80% accuracy, to be achieved in 4-6 weeks.   10/16/24: Attempted to engage pt in LTM reminiscence tasks; patient unable to comment on fixed questions re: childhood, young adulthood, working, personality throughout life. Patient interjected with empty speech/telegraphic utterances x2.   11/4/24: Attempted to engaged patient in LTM and reminiscence tx discussing favorite foods. Patient IND added 4 appropriate comments; increased communicative effectiveness given verbal shaping of responses noted.     4. Patient and family will participate in creation and use of memory book with carry over in 90% of opportunities to facilitate improved overall function and quality of life, to be achieved in 4-6 weeks.       Plan:  -Patient was provided with home exercises/activities to target goals in plan of care at the end of today's session.  -Continue with current plan of care.   "

## 2024-11-05 ENCOUNTER — OFFICE VISIT (OUTPATIENT)
Dept: NEUROLOGY | Facility: CLINIC | Age: 69
End: 2024-11-05
Payer: COMMERCIAL

## 2024-11-05 VITALS
WEIGHT: 150.8 LBS | OXYGEN SATURATION: 93 % | HEIGHT: 70 IN | HEART RATE: 66 BPM | BODY MASS INDEX: 21.59 KG/M2 | DIASTOLIC BLOOD PRESSURE: 60 MMHG | SYSTOLIC BLOOD PRESSURE: 110 MMHG

## 2024-11-05 DIAGNOSIS — G30.0 EARLY ONSET ALZHEIMER'S DEMENTIA WITHOUT BEHAVIORAL DISTURBANCE (HCC): Primary | ICD-10-CM

## 2024-11-05 DIAGNOSIS — F02.80 EARLY ONSET ALZHEIMER'S DEMENTIA WITHOUT BEHAVIORAL DISTURBANCE (HCC): Primary | ICD-10-CM

## 2024-11-05 PROCEDURE — 99214 OFFICE O/P EST MOD 30 MIN: CPT | Performed by: PSYCHIATRY & NEUROLOGY

## 2024-11-05 NOTE — PROGRESS NOTES
Neurology Ambulatory Visit  Name: Jadon Ann       : 1955       MRN: [id number]   Encounter Provider: Abran Elizalde MD   Encounter Date: 2024  Encounter department: [facility]      Jadon Ann is a 69 y.o. male.       Assessment & Plan  Early onset Alzheimer's dementia without behavioral disturbance (HCC)    Orders:    Ambulatory referral to Neuropsychology; Future    Comprehensive metabolic panel; Future    CBC and differential; Future    Patient is on Aricept and Namenda was advised to continue with same, he had been advised in the past to have a neuropsych testing and not sure how much relief that.  A few since patient does not follow much commands but I have advised him to keep that appointment and see if he can get any more info.    He does have an appointment to be seen at [facility], the family was not keen for patient to have a spinal tap or go on antiamyloid medications, he is under constant supervision was advised to continue with a healthy nutritious diet continue with cognitive therapy, to take a multivitamin every day to take fall and safety precautions, and aspiration precautions, to keep his blood pressure, cholesterol, sugar under control, to go to the hospital if has any worsening symptoms and call me otherwise to see me back in 6 months or sooner if needed and follow-up with the other physicians.    Subjective:  Chief Complaint   Patient presents with    Early onset Alzheimer's dementia without behavioral disturb       HISTORY OF PRESENT ILLNESS    Patient is here in follow-up for his dementia, he is accompanied with his wife and son since the last visit according to the wife and the son he is doing better, he can take a shower on his own the wife does help him to some extent dressing up, he is able to eat on his own, he is tolerating the Aricept and Namenda well, denies any side effects to the medication, no headaches, according to the  family he was seen by an ophthalmologist and was told his eye examination is normal he continues to be in,Occupational therapy and seems to be doing good, his mood is good sleep is good, he does not drive, he has an appointment to be seen at [facility] for second opinion and for other medication management for his dementia in December, he has not had any falls no other complaints.        Prior Work-up:   MRI:       Past Medical History:    History reviewed. No pertinent past medical history.  History reviewed. No pertinent surgical history.    Family History:  History reviewed. No pertinent family history.    Social History:  Social History     Tobacco Use    Smoking status: Former     Current packs/day: 1.00     Types: Cigarettes    Smokeless tobacco: Never   Vaping Use    Vaping status: Former   Substance Use Topics    Alcohol use: No     Comment: occ    Drug use: No      Living situation:    Work:      Allergies:  No Known Allergies    Medications:    Current Outpatient Medications:     Cholecalciferol (VITAMIN D-3 PO), Take by mouth in the morning, Disp: , Rfl:     donepezil (ARICEPT) 10 mg tablet, Take 1 tablet (10 mg total) by mouth daily at bedtime, Disp: 90 tablet, Rfl: 2    memantine (NAMENDA) 10 mg tablet, Take 1 tablet (10 mg total) by mouth 2 (two) times a day, Disp: 180 tablet, Rfl: 2    Multiple Vitamin (multivitamin) capsule, Take 1 capsule by mouth daily, Disp: , Rfl:     Omega-3 Fatty Acids (FISH OIL PO), Take by mouth in the morning, Disp: , Rfl:     TURMERIC PO, Take by mouth in the morning, Disp: , Rfl:     LORazepam (ATIVAN) 1 mg tablet, 1Tablet half an hour prior to the MRI (Patient not taking: Reported on 10/20/2023), Disp: 1 tablet, Rfl: 0    naproxen (NAPROSYN) 500 mg tablet, Take 1 tablet (500 mg total) by mouth 2 (two) times a day as needed for mild pain (take with food) for up to 20 doses (Patient not taking: Reported on 4/18/2024), Disp: 20 tablet, Rfl: 0    oxyCODONE (ROXICODONE) 5  "mg immediate release tablet, Take 1 tablet (5 mg total) by mouth every 4 (four) hours as needed for moderate pain for up to 5 doses Max Daily Amount: 30 mg (Patient not taking: Reported on 4/18/2024), Disp: 5 tablet, Rfl: 0    Objective:  /60 (BP Location: Left arm, Patient Position: Sitting, Cuff Size: Large)   Pulse 66   Ht 5' 10\" (1.778 m)   SpO2 93%   BMI 22.27 kg/m²      Labs  I have reviewed pertinent labs:  CBC:   Lab Results   Component Value Date    WBC 9.20 10/17/2023    RBC 5.20 10/17/2023    HGB 15.7 10/17/2023    HCT 47.2 10/17/2023    MCV 91 10/17/2023     10/17/2023    MCH 30.2 10/17/2023    MCHC 33.3 10/17/2023    RDW 13.6 10/17/2023    MPV 9.9 10/17/2023    NEUTROABS 5.99 10/17/2023     CMP:   Lab Results   Component Value Date    SODIUM 139 10/17/2023    K 3.8 10/17/2023     10/17/2023    CO2 33 (H) 10/17/2023    AGAP 5 10/17/2023    BUN 13 10/17/2023    CREATININE 0.85 10/17/2023    GLUF 98 10/17/2023    CALCIUM 9.6 10/17/2023    AST 13 10/17/2023    ALT 13 10/17/2023    ALKPHOS 102 10/17/2023    TP 7.4 10/17/2023    ALB 4.2 10/17/2023    TBILI 0.58 10/17/2023    EGFR 88 10/17/2023     Vitamin B12   Lab Results   Component Value Date    ULIAJFCF83 566 10/17/2023     Folate   Lab Results   Component Value Date    FOLATE 19.5 10/17/2023              General Exam  GENERAL APPEARANCE:  No distress, alert, interactive and cooperative.  CARDIOVASCULAR: Warm and well perfused  LUNGS: normal work of breathing on room air  EXTREMITIES: no peripheral edema     Neurologic Exam  MENTAL STATE:  He could follow just simple commands occasionally .could not tell me his wife or son's name, could not count fingers, could not tell me the month of the year  CRANIAL NERVES:    Patient's eye exam is little limited as he had difficulty in counting fingers I could not make out if he has any field cut, he is able to track in all directions pupils are 2.5 mm and reactive, face is symmetrical to light " touch no facial asymmetry, hearing intact to finger rub bilaterally, soft palate lift symmetrically, shoulder shrug is symmetrical, tongue is midline without atrophy.  MOTOR:  Motor exam was normal. Muscle bulk and tone were normal in both upper and lower extremities. Muscle strength was 5/5 in distal and proximal muscles in both upper and lower extremities. No fasciculations, tremor or other abnormal movements were present.     GAIT:  Was unremarkable but slow and cautious with ambulating with assistance with his wife  ROS:  Review of Systems   Constitutional:  Negative for appetite change, fatigue and fever.   HENT: Negative.  Negative for hearing loss, tinnitus, trouble swallowing and voice change.    Eyes: Negative.  Negative for photophobia, pain and visual disturbance.   Respiratory: Negative.  Negative for shortness of breath.    Cardiovascular: Negative.  Negative for palpitations.   Gastrointestinal: Negative.  Negative for nausea and vomiting.   Endocrine: Negative.  Negative for cold intolerance.   Genitourinary: Negative.  Negative for dysuria, frequency and urgency.   Musculoskeletal:  Negative for back pain, gait problem, myalgias, neck pain and neck stiffness.   Skin: Negative.  Negative for rash.   Allergic/Immunologic: Negative.    Neurological: Negative.  Negative for dizziness, tremors, seizures, syncope, facial asymmetry, speech difficulty, weakness, light-headedness, numbness and headaches.   Hematological: Negative.  Does not bruise/bleed easily.   Psychiatric/Behavioral:  Positive for confusion and hallucinations. Negative for sleep disturbance.        I have reviewed the past medical history, surgical history, social and family history, current medications, allergies vitals, review of systems, and updated this information as appropriate today.    Administrative Statements   The total amount of time spent with the patient and on chart review and documentation was 25 minutes. Issues addressed  during this clinic visit included overall management, medication counseling or monitoring, and counseling and coordination of care.         Abran Elizalde MD

## 2024-11-05 NOTE — TELEPHONE ENCOUNTER
11/05/24    Patient Wife called office to notify that they were stuck in traffic, and that they were on their way to the Office.     I informed that I would reach out to the [place] Office to notify them, but I DON'T MAKE ANY PROMISES.    Patient Wife UNDERSTOOD, AGREED and Expressed her Thank.     Patient called around nine forty something, but I was not able to reach the [place] office until 10:00 AM due to loosing connection.    Needless to say, called now, spoke to  Vick from the [place] Office, informed the Reason of my call, and she acknowledged.      Any questions, please contact Patient or the [place] Office.   Thank You.

## 2024-11-05 NOTE — ASSESSMENT & PLAN NOTE
Orders:    Ambulatory referral to Neuropsychology; Future    Comprehensive metabolic panel; Future    CBC and differential; Future

## 2024-11-06 ENCOUNTER — OFFICE VISIT (OUTPATIENT)
Age: 69
End: 2024-11-06
Payer: COMMERCIAL

## 2024-11-06 ENCOUNTER — TELEPHONE (OUTPATIENT)
Age: 69
End: 2024-11-06

## 2024-11-06 DIAGNOSIS — R26.2 AMBULATORY DYSFUNCTION: Primary | ICD-10-CM

## 2024-11-06 PROCEDURE — 97112 NEUROMUSCULAR REEDUCATION: CPT

## 2024-11-06 NOTE — PROGRESS NOTES
"Daily Note     Today's date: 2024  Patient name: Jadon Ann  : 1955  MRN: [id number]  Referring provider: Abran Elizalde MD  Dx:   No diagnosis found.                 Subjective: Nothing new since IE. Patient arrived with wife who was present throughout entire session.      Objective: See treatment diary below    NuStep 7'  STS with Y MB 10x  STS with 6# MB 10x  Amb holding 6# MB 200ft x3  Ankle weights donned 4# ea B/L  Walking 200ft x2  Fwd/bwd 200ft x2  Speed ambulation 200ft x 8  Picking up bean bags off floor and putting them away 10x  Tossing beachball 20x attempted forward/backward walking  Stepping over SPCs on ground 4 laps    Assessment: Tolerated treatment well. Continued with NuStep this session at beginning which improved overall functioning and tolerance to exercises. Continue to keep patient engaged by having patient put things \"away\" which requires minimal cueing Patient would benefit from continued PT.       Plan: Continue per plan of care.      POC expires Unit limit Auth Expiration date PT/OT + Visit Limit? Co-Insurance   24                                   Visit/Unit Tracking  AUTH Status:  Date 9/23 10/7 10/10             Used                Remaining                         "

## 2024-11-06 NOTE — TELEPHONE ENCOUNTER
Writer called and left VM for patient in regards to referral on file. Writer will send outside resources when patient returns my call. Writer is closing referral at this time due to not having a provider that can complete the requested service.  left call back # [phone].

## 2024-11-07 ENCOUNTER — OFFICE VISIT (OUTPATIENT)
Age: 69
End: 2024-11-07
Payer: COMMERCIAL

## 2024-11-07 DIAGNOSIS — R26.2 AMBULATORY DYSFUNCTION: Primary | ICD-10-CM

## 2024-11-07 PROCEDURE — 97112 NEUROMUSCULAR REEDUCATION: CPT

## 2024-11-07 NOTE — PROGRESS NOTES
Daily Note     Today's date: 2024  Patient name: Jadon Ann  : 1955  MRN: [id number]  Referring provider: Abran Elizalde MD  Dx:   No diagnosis found.                 Subjective: Nothing new since IE. Patient arrived with wife who was present throughout entire session.      Objective: See treatment diary below  Ankle weights donned 4# ea B/L  NuStep 8'  STS with Y MB 10x  Amb holding 6# MB 200ft x3  Walking 200ft x2  Fwd/bwd 200ft x2  Speed ambulation 200ft x 8  Tossing beachball 20x attempted forward/backward walking  Stepping over SPCs on ground 4 laps  Pushing wheelchair with Zoey in chair 100ft x 3 laps with 180 degree turn negotiations   Stair  5 laps 1UE    NP:  Picking up bean bags off floor and putting them away 10x    Assessment: Tolerated treatment well. Improved technique with stairs today minimal cueing required. Visual attention/processing seems limited as can catch beach ball at times- then unable at other times. Does well with hand over hand cueing and bringing item to hands for attention. NV perform floor transfer.  Patient would benefit from continued PT.       Plan: Continue per plan of care.      POC expires Unit limit Auth Expiration date PT/OT + Visit Limit? Co-Insurance   24                                   Visit/Unit Tracking  AUTH Status:  Date                Used                Remaining

## 2024-11-11 ENCOUNTER — APPOINTMENT (OUTPATIENT)
Age: 69
End: 2024-11-11
Payer: COMMERCIAL

## 2024-11-18 ENCOUNTER — OFFICE VISIT (OUTPATIENT)
Age: 69
End: 2024-11-18
Payer: COMMERCIAL

## 2024-11-18 DIAGNOSIS — G30.0 EARLY ONSET ALZHEIMER'S DEMENTIA WITHOUT BEHAVIORAL DISTURBANCE (HCC): ICD-10-CM

## 2024-11-18 DIAGNOSIS — F02.80 EARLY ONSET ALZHEIMER'S DEMENTIA WITHOUT BEHAVIORAL DISTURBANCE (HCC): ICD-10-CM

## 2024-11-18 DIAGNOSIS — R48.8 OTHER SYMBOLIC DYSFUNCTIONS: Primary | ICD-10-CM

## 2024-11-18 DIAGNOSIS — R26.2 AMBULATORY DYSFUNCTION: Primary | ICD-10-CM

## 2024-11-18 PROCEDURE — 97112 NEUROMUSCULAR REEDUCATION: CPT

## 2024-11-18 PROCEDURE — 92507 TX SP LANG VOICE COMM INDIV: CPT

## 2024-11-18 NOTE — PROGRESS NOTES
"Daily Note     Today's date: 2024  Patient name: Jadon Ann  : 1955  MRN: [id number]  Referring provider: Abran Elizalde MD  Dx:   Encounter Diagnosis     ICD-10-CM    1. Ambulatory dysfunction  R26.2                        Subjective: Zoey reports he is anxious today and having a rough day \"I don't know if I want to do this\" Pt is agreeable to PT      Objective: See treatment diary below  Ankle weights donned 3# ea B/L  NuStep 8'  STS with Y MB 10x  Amb holding 6# MB 200ft x3  Walking 200ft x2  Fwd/bwd 200ft x2  Speed ambulation 200ft x 8  Stair  5 laps 1UE  Tossing beachball 20x attempted forward/backward walking      Assessment: Tolerated treatment well. Patient hesitant and anxious throughout session. Improved with increased time of session and with aerobic activity. NV perform floor transfer.  Patient would benefit from continued PT.       Plan: Continue per plan of care.      POC expires Unit limit Auth Expiration date PT/OT + Visit Limit? Co-Insurance   24                                   Visit/Unit Tracking  AUTH Status:  Date               Used                Remaining                         "

## 2024-11-20 ENCOUNTER — OFFICE VISIT (OUTPATIENT)
Age: 69
End: 2024-11-20
Payer: COMMERCIAL

## 2024-11-20 DIAGNOSIS — R26.2 AMBULATORY DYSFUNCTION: Primary | ICD-10-CM

## 2024-11-20 PROCEDURE — 97112 NEUROMUSCULAR REEDUCATION: CPT

## 2024-11-20 NOTE — PROGRESS NOTES
Daily Note     Today's date: 2024  Patient name: Jadon Ann  : 1955  MRN: [id number]  Referring provider: Abran Elizalde MD  Dx:   Encounter Diagnosis     ICD-10-CM    1. Ambulatory dysfunction  R26.2                        Subjective: Pt reports he is sleepy      Objective: See treatment diary below  Ankle weights donned 4# ea B/L  NuStep 8'  STS with Y MB 10x  Amb holding 6# MB 200ft x3  Walking 200ft x2  Fwd/bwd 200ft x2  Speed ambulation 200ft x 8  Stair  5 laps 1UE  Tossing beachball 20x attempted forward/backward walking  Floor transfer 1x with UE assist on low mat table      Assessment: Tolerated treatment well. Completed floor transfer this session with good technique, minimal cueing required. Pt reports fatigue after 10x STS and deferred more repetitions. Able to complete session with other exercises with redirection frequently. NV continue with push/pull of wheelchair. Patient would benefit from continued PT.       Plan: Continue per plan of care.      POC expires Unit limit Auth Expiration date PT/OT + Visit Limit? Co-Insurance   24                                   Visit/Unit Tracking  AUTH Status:  Date              Used                Remaining

## 2024-11-25 ENCOUNTER — EVALUATION (OUTPATIENT)
Age: 69
End: 2024-11-25
Payer: COMMERCIAL

## 2024-11-25 ENCOUNTER — OFFICE VISIT (OUTPATIENT)
Age: 69
End: 2024-11-25
Payer: COMMERCIAL

## 2024-11-25 DIAGNOSIS — G30.0 EARLY ONSET ALZHEIMER'S DEMENTIA WITHOUT BEHAVIORAL DISTURBANCE (HCC): ICD-10-CM

## 2024-11-25 DIAGNOSIS — R26.2 AMBULATORY DYSFUNCTION: Primary | ICD-10-CM

## 2024-11-25 DIAGNOSIS — R48.8 OTHER SYMBOLIC DYSFUNCTIONS: Primary | ICD-10-CM

## 2024-11-25 DIAGNOSIS — F02.80 EARLY ONSET ALZHEIMER'S DEMENTIA WITHOUT BEHAVIORAL DISTURBANCE (HCC): ICD-10-CM

## 2024-11-25 PROCEDURE — 92523 SPEECH SOUND LANG COMPREHEN: CPT

## 2024-11-25 PROCEDURE — 97112 NEUROMUSCULAR REEDUCATION: CPT

## 2024-11-25 NOTE — PROGRESS NOTES
"Speech-Language Pathology Re-evaluation  Skilled Maintenance Program    Today's date: 2024   Patient’s name: Jadon Ann  : 1955  MRN: [id number]  Safety measures: Alzheimer's   Referring provider: Abran Elizalde MD    Encounter Diagnosis     ICD-10-CM    1. Other symbolic dysfunctions  R48.8       2. Early onset Alzheimer's dementia without behavioral disturbance (HCC)  G30.0     F02.80           Assessment:  Patient presents with progressing severe cognitive-linguistic impairment c/b deficits with orientation (personal information, time, place), attention, memory, organization of thoughts, planning, sequencing, problem solving, following basic and multi-step directions, verbal fluency, and limited participation in conversation. Patient requires consistent encouragement from clinician and spouse in order to engage socially or he tends to shut down d/t communication difficulties. Per patient's wife, \"I think he's doing a lot better, and this is helping him.\" Clinician, patient, and spouse, have discussed keeping patient socially engaged with different peers as much as possible. Patient's wife reported they continue to meet with friends weekly, and go to different community and art events to keep patient participating socially. Patient was administered the [place] Naming Test- Second Edition (BNT-2; short form) today to assess patient's confrontational naming skills and to detect a word finding impairment. The patient obtained a score of 1/15; semantic and phonemic cues did not help the patient generate correct responses. The results of this assessment should be interpreted with caution as test administration protocol was modified (written/orthographic cues were not given) as patient's visual perceptual field may be limited; symbol recognition and reading comprehension are very impaired. It is suspected that patient would benefit from continuation of outpatient skilled Speech Therapy services to " target goals to maintain his current level of cognitive-linguistic functioning. Patient and wife are in agreement with this plan. Solely, an HEP is not appropriate at this time as he continues to require the guidance and clinical decision making from a skilled clinician. Without the skills of a clinician providing these necessary services, patient’s status may decline (e.g., decreased functional recall, reduced attempts with verbal expression, decreased comprehension, socially withdrawn, reduced quality of life, decreased safety, increased frustration, caregiver burden).         Short Term Goals  1. Patient and family/caregiver will be educated on dementia and related topics for improved prognosis of care, to be achieved in 4-6 weeks. GOAL MET. D/C GOAL  2. Patient will participate in continued therapeutic trials with 80% accuracy for continued assessment and recommendations for carryover and functional tasks at home, to be achieved in 4-6 weeks. ONGOING  3. Patient will improve long term memory retrieval and recall of information during reminiscing tasks for creation of memory book with 80% accuracy, to be achieved in 4-6 weeks. ONGOING  4. Patient and family will participate in creation and use of memory book with carry over in 90% of opportunities to facilitate improved overall function and quality of life, to be achieved in 4-6 weeks. ONGOING     Long Term Goals  1. Patient will improve functional cognitive-linguistic skills with the implementation of cues and external aids, by discharge. ONGOING  2. Patient will demonstrate adequate memory/reasoning/judgment to perform desired activities in a supervised environment by discharge. ONGOING  3. Patient's family/caregiver will demonstrate implementation of memory book/aides into daily activities to assist with ADLS and improve quality of life. ONGOING          Plan:  Patient would benefit from outpatient skilled Speech Therapy services: Maintenance therapy  "program    Frequency: 1x weekly  Duration: 3 months    Intervention certification from: 10/8/2024  Intervention certification to: 2024      Subjective:  Patient pleasant, engaged, and cooperative. Patient's wife in attendance for today's session. Patient's wife reported they were \"out gallivanting all day\" and patient was very fatigued.       Patient's goal(s): \"Try to take care of ...\" - patient was unable to complete sentence. As per wife, \"just to keep him the way he is for as long as we can.\"      Objective (testing):  The [place] Naming Test-Second Edition (BNT-2) is a confrontational naming assessment that asks patients to provide the best name for a given picture. It was designed to detect word-finding impairments. The BNT-2 consists of 60 pictures, ordered from easiest to most difficult. Stimulus cues, including semantic, phonemic, and written information, are provided as necessary.      The following results were obtained during the administration of the assessment:     Summary of Scores: Score:   1. Number of spontaneously given correct response: 1       2. Number of stimulus cues given:  14   3. Number of correct responses following a stimulus cue:  0       *TOTAL SCORE: 1   Percentile Rank:  <0.1%ile       Additional Cuein. Number of phonemic cues: 14   5. Number of correct responses following the phonemic cue: 1       6. Number of multiple choices given:  N/A 2/2 patient unable to read   7. Number of correct choices: N/A                  Visit Tracking:   POC expires Unit limit Auth Expiration date PT/OT + Visit Limit?   10/8/24 N/A  NO COG CODES 25 BOMN                                           Visit/Unit Tracking  AUTH Status:  Date    PU  8/29  9/12  9/25  10/3 10/7 10/16 11/4 11/18  11/25  RE    No Used  8  1  2  3  4 5 6 7 8  9      Remaining   2  9  8  7  6 5 4 3 2  1          Intervention comments:  - 45 minutes speech sound production and language comprehension evaluation "

## 2024-11-25 NOTE — PROGRESS NOTES
Daily Note     Today's date: 2024  Patient name: Jadon Ann  : 1955  MRN: [id number]  Referring provider: Abran Elizalde MD  Dx:   Encounter Diagnosis     ICD-10-CM    1. Ambulatory dysfunction  R26.2                        Subjective: Pt reports he is sleepy. Wife Zoey reports had long day in [place]      Objective: See treatment diary below    Ankle weights donned 4# ea B/L  NuStep 8'  STS with Y MB 10x  Amb holding 6# MB 200ft x3  Walking 200ft x2  Fwd/bwd 200ft x2  Speed ambulation 200ft x 8    Assessment: Tolerated treatment poor. Pt fatigued this session. Difficult with single step commands, requiring occasional hand over hand cueing. Modified session as needed with increased rest breaks. Patient would benefit from continued PT.       Plan: Continue per plan of care.      POC expires Unit limit Auth Expiration date PT/OT + Visit Limit? Co-Insurance   24                                   Visit/Unit Tracking  AUTH Status:  Date             Used                Remaining

## 2024-11-27 ENCOUNTER — APPOINTMENT (OUTPATIENT)
Age: 69
End: 2024-11-27
Payer: COMMERCIAL

## 2024-11-29 ENCOUNTER — OFFICE VISIT (OUTPATIENT)
Age: 69
End: 2024-11-29
Payer: COMMERCIAL

## 2024-11-29 DIAGNOSIS — R26.2 AMBULATORY DYSFUNCTION: Primary | ICD-10-CM

## 2024-11-29 PROCEDURE — 97112 NEUROMUSCULAR REEDUCATION: CPT

## 2024-11-29 NOTE — PROGRESS NOTES
Daily Note     Today's date: 2024  Patient name: Jadon Ann  : 1955  MRN: [id number]  Referring provider: Abran Elizalde MD  Dx:   Encounter Diagnosis     ICD-10-CM    1. Ambulatory dysfunction  R26.2                        Subjective: Pt reports he is good today      Objective: See treatment diary below    Ankle weights donned 3# ea B/L  NuStep 8'  STS with Y MB 10x  Amb holding 6# MB 200ft x3  Walking 200ft x2  Fwd/bwd 200ft x2  Speed ambulation 200ft x 8  Bean bag  5x  Beach ball pass 20x    Assessment: Tolerated treatment well. Significant improvement from last session. Able to follow simple 1 step commands 75%of the time. Good tolerance with STS with increased repetition. Continue to progress as tolerated. Patient would benefit from continued PT.       Plan: Continue per plan of care.      POC expires Unit limit Auth Expiration date PT/OT + Visit Limit? Co-Insurance   24                                   Visit/Unit Tracking  AUTH Status:  Date            Used                Remaining

## 2024-12-02 ENCOUNTER — APPOINTMENT (OUTPATIENT)
Age: 69
End: 2024-12-02
Payer: COMMERCIAL

## 2024-12-03 ENCOUNTER — APPOINTMENT (OUTPATIENT)
Age: 69
End: 2024-12-03
Payer: COMMERCIAL

## 2024-12-05 ENCOUNTER — EVALUATION (OUTPATIENT)
Age: 69
End: 2024-12-05
Payer: COMMERCIAL

## 2024-12-05 ENCOUNTER — APPOINTMENT (OUTPATIENT)
Age: 69
End: 2024-12-05
Payer: COMMERCIAL

## 2024-12-05 DIAGNOSIS — R26.2 AMBULATORY DYSFUNCTION: Primary | ICD-10-CM

## 2024-12-05 PROCEDURE — 97164 PT RE-EVAL EST PLAN CARE: CPT

## 2024-12-05 PROCEDURE — 97530 THERAPEUTIC ACTIVITIES: CPT

## 2024-12-05 NOTE — PROGRESS NOTES
PT Re-Evaluation / Maintenance          POC expires Unit limit Auth Expiration date PT/OT + Visit Limit? Co-Insurance   12/23/24       3/5/25                            Visit/Unit Tracking  AUTH Status:  Date                  Used                             Remaining                                         Today's date: 2024  Patient name: Jadon Ann  : 1955  MRN: [id number]  Referring provider: Abran Elizalde MD  Dx:   Encounter Diagnosis     ICD-10-CM    1. Ambulatory dysfunction  R26.2             Assessment  Assessment details: Patient is a 69 y.o. Male who presents to skilled outpatient PT with ambulatory dysfunction. Significant PMHx of dementia. Wife present throughout session. POC complicated by sister passing away. Since last progress note, patient demo significant improvement in all outcome measures. Continues to be limited by cueing, requiring hand over hand and one step, however able to complete without cueing with increased repetition. Despite improvements, pt continues to present with decreased activity tolerance, poor sequencing with activities- improved with one step commands, occasional hand over hand cueing required. Difficult to assess sensation, though pt reports mild tingling in B/L feet. Gait deviation towards the L with ambulation and dec step length B/L. He is still at risk for falls, and is below age related norms. He is making progress towards goals and new goals established. At this time, will transition to maintenance program focusing on maintaining gains due to hx of progressive disorder. Significant amount of time spent completing patient and family education on benefits to aerobic exercise, HEP, consistency of therapy, and prognosis. Pt and wife aware and agreeable to continue for another month and then reassess. At this time the patient is appropriate to  transition to maintenance skilled PT to maintain current gains and prevent regression. Regression is likely, due to history or regression noted, sedentary lifestyle, presence of neurodegenerative/neurocognitive disorder, and limited ability for family member/caregiver to assist with HEP. Both the patient and caregiver would be at risk for increased injury if patient had LOB or fall due to inadequate safety equipment, which could lead to hospitalization and increased healthcare costs. New goals have been created below to reflect new focus of maintenance therapy. Pt and wife in agreement with POC.    Bebeto Huston (2013): a [place] District Court decision that sought to clarify that Medicare will in fact cover skilled therapy services to maintain a patient’s current condition or prevent/slow further deterioration.    Impairments: Abnormal coordination, Abnormal gait, Abnormal muscle tone, Abnormal or restricted ROM, Activity intolerance, Impaired balance, Impaired physical strength, Lacks appropriate HEP, Poor posture, Poor body mechanics, Pain with function, Safety issue, Weight-bearing intolerance, Abnormal movement, Difficulty understanding, Abnormal muscle firing  Understanding of Dx/Px/POC: Good  Prognosis: Good    Patient verbalized understanding of POC.       Please contact me if you have any questions or recommendations. Thank you for the referral and the opportunity to share in Jadon Ann's care.    Plan  Plan details: Will 1-2x/week  Patient would benefit from: PT Eval, Skilled PT, OT Eval, Skilled OT, Speech Eval, and Skilled Speech Therapy  Planned modality interventions: Biofeedback, Cryotherapy, TENS, Thermotherapy  Planned therapy interventions: Abdominal trunk stabilization, ADL training, Balance, Balance/WB training, Breathing training, Body mechanics training, Coordination, Functional ROM exercises, Gait training, HEP, Joint Mobilization, Manual Therapy, Dior taping, Motor  coordination training, Neuromuscular re-education, Patient education, Postural training, Strengthening, Stretching, Therapeutic activities, Therapeutic exercises, Therapeutic training, Transfer training, Activity modification, Work reintegration  Frequency:1- 2x/wk  Duration in weeks: 12  Plan of Care beginning date: 12/5/24  Plan of Care expiration date: 3 months - 3/5/25  Treatment plan discussed with: Patient and Family    Goals  Short Term Goals (4 weeks):    - Patient will improve time on TUG by 2.9 seconds to facilitate improved safety in all ambulation  - Patient will be independent in basic HEP 2-3 weeks  - Patient will improve 5xSTS score by 2.3 seconds to promote improved LE functional strength needed for ADLs    Long Term Goals (12 weeks):  - Patient will be independent in a comprehensive home exercise program  - Patient will improve gait speed by 0.18 m/s to improve safety with community ambulation  - Patient will improve LERMA by 6 points in order to improve static balance and reduce risk for falls  - Patient will be able to demonstrate HT in gait without veering  - Patient will improve 6 Minute Walk Test score by 190 feet to promote improved cardiovascular endurance  - Patient will report 50% reduction in near falls in order to improve safety with functional tasks and reduce his risk for falls  - Patient will report going on walks at least 3 days per week to promote independence and improved cardiovascular endurance  - Patient will be able to ascend/descend stairs reciprocally with 1 UE assist to promote independence and safety with ADLs  - Patient will report 50% reduction in near falls when ambulating on uneven terrain      UPDATED GOALS:  - Patient will ambulate outdoors on uneven surfaces with limited assistance to facilitate safe community ambulation  - Patient will maintain gait speed per 10 meter walk test at 0.6 m/s to facilitate continued safety with community ambulation  - Patient will  continue to score at least 46/56 on LERMA to maintain fall risk status to reduce risk of injury  - Patient will be able to ambulate at least 1,000 ft during 6 Minute Walk Test to maintain current cardiovascular capacity  - Patient will attend PT 1x/week with focus on balance and gait training to maintain functional capacity  - Patient will maintain TUG      Cut off score  All date taken from [facility] Neuro Section or Rehab Measures    Lerma/56  MDC: 6 pts  Age Norms:  60-69: M - 55   F - 55  70-79: M - 54   F - 53  80-89: M - 53   F - 50 5xSTS: Amina et al 2010  MDC: 2.3 sec  Age Norms:  60-69: 11.4 sec  70-79: 12.6 sec  80-89: 14.8 sec   TUG  MDC: 4.14 sec  Cut off score:  >13.5 sec community dwelling adults  >32.2 frail elderly  <20 I for basic transfers  >30 dependent on transfers 10 Meter Walk Test: Ahmet et al 2011  MDC: 0.18 m/s  20-29: M - 1.35 m   F - 1.34 m  30-39: M - 1.43 m   F - 1.34 m  40-49: M - 1.43 m   F - 1.39 m  50-59: M - 1.43 m   F - 1.31 m  60-69: M - 1.34 m   F - 1.24 m  70-79: M - 1.26 m   F - 1.13 m  80-89: M - 0.97 m   F - 0.94 m    Household Ambulator < 0.4 m/s  Limited Community Ambulator 0.4 - 0.8 m/s  Community Ambulator 0.8 - 1.2 m/s  Safely cross the street > 1.2 m/s   FGA  MCID: 4 pts  Geriatrics/community < 22/30 fall risk  Geriatrics/community < 20/30 unexplained falls    DGI  MDC: vestibular - 4 pts  MDC: geriatric/community - 3 pts  Falls risk <19/24 mCTSIB  Norm: 20-60 yrs  Eyes open firm: norm sway 0.21-0.48  Eyes closed firm: norm sway 0.48-0.99  Eyes open foam: norm sway 0.38-0.71  Eyes closed foam: norm sway 0.70-2.22   6 Minute Walk Test  MDC: 190.98 ft  MCID: 164 ft    Age Norms  60-69: M - 1876 ft (571.80 m)  F - 1765 ft (537.98 m)  70-79: M - 1729 ft (527.00 m)  F - 1545 ft (470.92 m)  80-89: M - 1368 ft (416.97 m)  F - 1286 ft (391.97 m) ABC: Candi & Haseeb, 2003  <67% increased risk for falls   Leslie-Tammie Monofilaments  Evaluator Size:        Force  (grams):          Hand/Dorsal Thresholds:        Plantar Thresholds:  - 1.65                       - 0.008                       - Normal                                 - Normal  - 2.36                       - 0.02                         - Normal                                 - Normal  - 2.44                       - 0.04                         - Normal                                 - Normal  - 2.83                       - 0.07                         - Normal                                 - Normal  - 3.22                       - 0.16                         - Diminished light touch          - Normal  - 3.61                       - 0.40                         - Diminished light touch          - Normal  - 3.84                       - 0.60                         - Diminished protective           - Diminished light touch  - 4.08                       - 1.00                         - Diminished protective           - Diminished light touch  - 4.17                       - 1.40                         - Diminished protective           - Diminished light touch  - 4.31                       - 2.00                         - Diminished protective           - Diminished light touch  - 4.56                       - 4.00                         - Loss of protective sense      - Diminished protective  - 4.74                       - 6.00                         - Loss of protective sense      - Diminished protective  - 4.93                       - 8.00                         - Loss of protective sense      - Diminished protective  - 5.07                       - 10.0                         - Loss of protective sense     - Loss of protective sense  - 5.18                       - 15.0                         - Loss of protective sense     - Loss of protective sense  - 5.46                       - 26.0                         - Loss of protective sense     - Loss of protective sense  - 5.88                       - 60.0                          - Loss of protective sense     - Loss of protective sense  - 6.10                       - 100                          - Loss of protective sense     - Loss of protective sense  - 6.45                       - 180                          - Loss of protective sense     - Loss of protective sense  - 6.65                       - 300                          - Deep pressure sense only  - Deep pressure sense only         Subjective    History of Present Illness  - Mechanism of injury: has bike at home- interested in going to the gym but is concerned for safety. Has sig PMHx of dementia. Wife present and assisted with history. He requires assist for ADLs and iADLs. Difficulty with sequencing and has FOF.    UPDATE 24: Pt reports they missed last week due to personal issues. They are staying busy at home.     UPDATE 24: Patient and wife arrive to PT. Nothing new to report. Son drove them to PT.     - Primary AD: none    Patient goal: improve confidence with walking    Pain  - Current pain ratin/10    Social Support  - Steps to enter house: 4 JAMARI   - Stairs in house: 2 story- has first floor set up has FF with HR   - Lives with: wife  - Hand dominance: R    Objective  LE MMT  - R Hip Flexion: 4/5   L Hip Flexion: 4/5  - R Knee Extension: 4/5  L Knee Extension: 4/5  - R Knee Flexion: 4-/5   L Knee Flexion: 4-/5  - R Ankle DF: 4/5   L Ankle DF: 4/5  - R Ankle PF: 4/5   L Ankle PF: 4/5    Sensation  - Light touch: wfl    - Temperature: subjective yes    Coordination  - Heel to Shin: unable to complete  - Alternate Toe Taps: unable to complete    Postural Screen  - Observation: moderate forward head and rounded shoulders      Gait  - Abnormalities: dec step length B/L, with Lateral deviation towrads the L       Outcome Measures Initial Eval        5xSTS 31.61 sec no UE 23.91s no UE 20.73s no UE      TUG  - Regular  - Cognitive   25.53 sec   22.83s    21.5s      10 meter 0.62 m/s  0.6m/s 0.61m/s      FLORENTIN 31/56 42/56 46/56      mCTSIB  - FTEO (firm)  - FTEC (firm)  - FTEO (foam)  - FTEC (foam)   30 sec  30 sec  NT   30  30       NT      6MWT 800 ft GB 900ft GB 1,50ft 1 HHA      ABC 35.63%                   NuStep 8'  STS with Y MB 10x  Amb holding 6# MB 200ft x3        Precautions: dementia  No past medical history on file.

## 2024-12-09 ENCOUNTER — APPOINTMENT (OUTPATIENT)
Age: 69
End: 2024-12-09
Payer: COMMERCIAL

## 2024-12-12 ENCOUNTER — APPOINTMENT (OUTPATIENT)
Age: 69
End: 2024-12-12
Payer: COMMERCIAL

## 2024-12-16 ENCOUNTER — OFFICE VISIT (OUTPATIENT)
Age: 69
End: 2024-12-16
Payer: COMMERCIAL

## 2024-12-16 DIAGNOSIS — R48.8 OTHER SYMBOLIC DYSFUNCTIONS: Primary | ICD-10-CM

## 2024-12-16 DIAGNOSIS — F02.80 EARLY ONSET ALZHEIMER'S DEMENTIA WITHOUT BEHAVIORAL DISTURBANCE (HCC): ICD-10-CM

## 2024-12-16 DIAGNOSIS — R26.2 AMBULATORY DYSFUNCTION: Primary | ICD-10-CM

## 2024-12-16 DIAGNOSIS — G30.0 EARLY ONSET ALZHEIMER'S DEMENTIA WITHOUT BEHAVIORAL DISTURBANCE (HCC): ICD-10-CM

## 2024-12-16 PROCEDURE — 97112 NEUROMUSCULAR REEDUCATION: CPT

## 2024-12-16 PROCEDURE — 92507 TX SP LANG VOICE COMM INDIV: CPT

## 2024-12-16 NOTE — PROGRESS NOTES
Daily Note     Today's date: 2024  Patient name: Jadon Ann  : 1955  MRN: [id number]  Referring provider: Abran Elizalde MD  Dx:   Encounter Diagnosis     ICD-10-CM    1. Ambulatory dysfunction  R26.2                        Subjective: Pt reports he is good today. Wife present, denies falls.      Objective: See treatment diary below    Ankle weights donned 3# ea B/L  NuStep Lvl 2 x 15 min, seat 11, UE 10  STS with Y MB x15  Amb holding 6# MB 200ft x3  Speed amb with L 'x2  Fwd/bwd amb 200'    Assessment: Tolerated treatment well. Able to follow simple 1 step commands 50%of the time. Good tolerance with STS with increased repetition. Progressed with inc intensity. Continue to progress as tolerated. Patient verbalized fatigue end of session and would benefit from continued PT.       Plan: Continue per plan of care.      POC expires Unit limit Auth Expiration date PT/OT + Visit Limit? Co-Insurance   24                                   Visit/Unit Tracking  AUTH Status:  Date  PN          Used                Remaining

## 2024-12-16 NOTE — PROGRESS NOTES
Daily Speech Treatment Note  Skilled Maintenance    Today's date: 2024   Patient’s name: Jadon Ann  : 1955  MRN: [id number]  Safety measures: Alzheimer's   Referring provider: Abran Elizalde MD    Encounter Diagnosis     ICD-10-CM    1. Other symbolic dysfunctions  R48.8       2. Early onset Alzheimer's dementia without behavioral disturbance (HCC)  G30.0     F02.80           Visit Tracking:   POC expires Unit limit Auth Expiration date PT/OT + Visit Limit?   10/8/24 N/A  NO COG CODES 25 BOMN                                           Visit/Unit Tracking  AUTH Status:  Date    RE           No Used  9  1           Remaining   1  9               Subjective/Behavioral:  -Patient pleasant, engaged, and cooperative. Patient's wife present for today's session. Patient's wife reported f/u with [facility] Neurology, who reported patient is not a candidate for any further dementia medication management 2/2 PLOF with cognitive status.     Objective/Assessment:  -Patient's family member/caregiver was present during today's session.  -Reviewed testing results and goals in plan care with patient. Patient is in agreement at this time.    Short-term goals:  Patient will participate in continued therapeutic trials with 80% accuracy for continued assessment and recommendations for carryover and functional tasks at home, to be achieved in 4-6 weeks.  24: Patient answered Y/N questions regarding a given topic with 60% accuracy. Accuracy remained at 60% given max cues including clinician models. Patient then answered fixed choice questions regarding the topic with 40% accuracy. Accuracy increased to 70% given max cues.    Patient will improve long term memory retrieval and recall of information during reminiscing tasks for creation of memory book with 80% accuracy, to be achieved in 4-6 weeks.   Patient and family will participate in creation and use of memory book with carry over in 90% of  opportunities to facilitate improved overall function and quality of life, to be achieved in 4-6 weeks.     Plan:  -Continue with current plan of care.

## 2024-12-19 ENCOUNTER — APPOINTMENT (OUTPATIENT)
Age: 69
End: 2024-12-19
Payer: COMMERCIAL

## 2024-12-19 ENCOUNTER — OFFICE VISIT (OUTPATIENT)
Age: 69
End: 2024-12-19
Payer: COMMERCIAL

## 2024-12-19 DIAGNOSIS — R26.2 AMBULATORY DYSFUNCTION: Primary | ICD-10-CM

## 2024-12-19 PROCEDURE — 97112 NEUROMUSCULAR REEDUCATION: CPT

## 2024-12-19 NOTE — PROGRESS NOTES
Daily Note     Today's date: 2024  Patient name: Jadon Ann  : 1955  MRN: [id number]  Referring provider: Abran Elizalde MD  Dx:   Encounter Diagnosis     ICD-10-CM    1. Ambulatory dysfunction  R26.2                 Subjective: Pt reports he is good today. Wife present, denies falls.      Objective: See treatment diary below    Ankle weights donned 3# ea B/L  NuStep Lvl 2 x 15 min, seat 11, UE 10  STS with Y MB x15  Amb holding 6# MB 200ft x3  Speed amb with L 'x2  Fwd/bwd amb 200'  Bean bag  from floor 10x   Stair  5 laps 1 UE    Assessment: Tolerated treatment well. Able to follow simple 1 step commands 50%of the time. Improved carryover with carrying MB and with stairs this session. Continued freuqent cueing for STS required and frequent redirection/hand over hand assist. Patient verbalized fatigue end of session and would benefit from continued PT.       Plan: Continue per plan of care.      POC expires Unit limit Auth Expiration date PT/OT + Visit Limit? Co-Insurance   24                                   Visit/Unit Tracking  AUTH Status:  Date  PN         Used                Remaining

## 2024-12-23 ENCOUNTER — OFFICE VISIT (OUTPATIENT)
Age: 69
End: 2024-12-23
Payer: COMMERCIAL

## 2024-12-23 DIAGNOSIS — R26.2 AMBULATORY DYSFUNCTION: Primary | ICD-10-CM

## 2024-12-23 PROCEDURE — 97112 NEUROMUSCULAR REEDUCATION: CPT

## 2024-12-23 NOTE — PROGRESS NOTES
Daily Note     Today's date: 2024  Patient name: Jadon Ann  : 1955  MRN: [id number]  Referring provider: Abran Elizalde MD  Dx:   Encounter Diagnosis     ICD-10-CM    1. Ambulatory dysfunction  R26.2                 Subjective: Pt reports he is good today. Wife present, denies falls.      Objective: See treatment diary below    Ankle weights donned 3# ea B/L  NuStep Lvl 2 x 8 min, seat 11, UE 10  STS with Y MB x15  Amb holding 6# MB 200ft x3  Speed amb with L 'x2  Fwd/bwd amb 25ft x 8 laps  Ball toss fwd/bwd 20 catches to PT  Stair  6 laps 1 UE    Assessment: Tolerated treatment well. Able to follow simple 1 step commands 25%of the time, max curing for STS. Pt had inc fatigue. Progressed manual dual task to 10# Tidal tank carry with good tolerance. Patient verbalized fatigue end of session and would benefit from continued PT.       Plan: Continue per plan of care.      POC expires Unit limit Auth Expiration date PT/OT + Visit Limit? Co-Insurance   24                                   Visit/Unit Tracking  AUTH Status:  Date  PN        Used                Remaining

## 2024-12-27 ENCOUNTER — APPOINTMENT (OUTPATIENT)
Age: 69
End: 2024-12-27
Payer: COMMERCIAL

## 2024-12-31 ENCOUNTER — APPOINTMENT (OUTPATIENT)
Age: 69
End: 2024-12-31
Payer: COMMERCIAL

## 2025-01-02 ENCOUNTER — OFFICE VISIT (OUTPATIENT)
Age: 70
End: 2025-01-02
Payer: COMMERCIAL

## 2025-01-02 DIAGNOSIS — F02.80 EARLY ONSET ALZHEIMER'S DEMENTIA WITHOUT BEHAVIORAL DISTURBANCE (HCC): ICD-10-CM

## 2025-01-02 DIAGNOSIS — R48.8 OTHER SYMBOLIC DYSFUNCTIONS: Primary | ICD-10-CM

## 2025-01-02 DIAGNOSIS — G30.0 EARLY ONSET ALZHEIMER'S DEMENTIA WITHOUT BEHAVIORAL DISTURBANCE (HCC): ICD-10-CM

## 2025-01-02 PROCEDURE — 92507 TX SP LANG VOICE COMM INDIV: CPT

## 2025-01-02 NOTE — PROGRESS NOTES
"Daily Speech Treatment Note  Skilled Maintenance    Today's date: 2025   Patient’s name: Jadon Ann  : 1955  MRN: [id number]  Safety measures: Alzheimer's   Referring provider: Abran Elizalde MD    Encounter Diagnosis     ICD-10-CM    1. Other symbolic dysfunctions  R48.8       2. Early onset Alzheimer's dementia without behavioral disturbance (HCC)  G30.0     F02.80             Visit Tracking:   POC expires Unit limit Auth Expiration date PT/OT + Visit Limit?   10/8/24 N/A  NO COG CODES 25 BOMN                                           Visit/Unit Tracking  AUTH Status:  Date    RE  25        No Used  9  1 2          Remaining   1  9 8              Subjective/Behavioral:  -Patient pleasant and cooperative. Patient's wife present for today's session. Patient's wife reported, \"he's a little off. He's a little more confused.\"    Objective/Assessment: Discussed holiday time/events/traveling and increase in exposure to different people/groups may have contributed to increased confusion. Patient demonstrated increased frustration with language task, which was deferred to keep patient engaged in tx tasks.       Short-term goals:  Patient will participate in continued therapeutic trials with 80% accuracy for continued assessment and recommendations for carryover and functional tasks at home, to be achieved in 4-6 weeks.  24: Patient answered Y/N questions regarding a given topic with 60% accuracy. Accuracy remained at 60% given max cues including clinician models. Patient then answered fixed choice questions regarding the topic with 40% accuracy. Accuracy increased to 70% given max cues.  25: Patient IND provided a food for a each letter of the alphabet with 25% accuracy, IND, for those letters addressed. Task deferred 2/2 increased frustration and emotional lability.    Patient will improve long term memory retrieval and recall of information during reminiscing tasks for " creation of memory book with 80% accuracy, to be achieved in 4-6 weeks.   1/2/2/5: Patient, wife, and clinician continued with development of memory book with patient IND providing 1 of 4 siblings names spontaneously. Patient required a fixed choice of two to recall other siblings names, which he did with 50% accuracy given max cues. Patient, with verbal shaping of responses from wife, was able to provide names and information regarding his parents and their professions.   Patient and family will participate in creation and use of memory book with carry over in 90% of opportunities to facilitate improved overall function and quality of life, to be achieved in 4-6 weeks.   1/2/25: Created segments for family information including parents' names, professions, place of residence, and siblings' names.     Plan:  -Continue with current plan of care.

## 2025-01-06 ENCOUNTER — EVALUATION (OUTPATIENT)
Age: 70
End: 2025-01-06
Payer: COMMERCIAL

## 2025-01-06 DIAGNOSIS — R26.2 AMBULATORY DYSFUNCTION: Primary | ICD-10-CM

## 2025-01-06 PROCEDURE — 97112 NEUROMUSCULAR REEDUCATION: CPT

## 2025-01-06 NOTE — PROGRESS NOTES
PT Re-Evaluation / Maintenance          POC expires Unit limit Auth Expiration date PT/OT + Visit Limit? Co-Insurance   12/23/24       3/5/25                            Visit/Unit Tracking  AUTH Status:  Date 25                      Used                             Remaining                                         Today's date: 2025  Patient name: Jadon Ann  : 1955  MRN: [id number]  Referring provider: Abran Elizalde MD  Dx:   Encounter Diagnosis     ICD-10-CM    1. Ambulatory dysfunction  R26.2             Assessment  Assessment details: Patient is a 69 y.o. Male who presents to skilled outpatient PT with ambulatory dysfunction. Significant PMHx of dementia. Wife present throughout session. P Since last progress note, patient demo significant improvement in all outcome measures. Continues to be limited by cueing, requiring hand over hand and one step, however able to complete without cueing with increased repetition. Trialed completing outcome measures 2x with significant improvement in second repetition. Despite improvements, pt continues to present with decreased activity tolerance, poor sequencing with activities- improved with one step commands, occasional hand over hand cueing required.He is still at risk for falls, and is below age related norms. He is making progress towards goals and new goals established. At this time, will transition to maintenance program focusing on maintaining gains due to hx of progressive disorder. Significant amount of time spent completing patient and family education on benefits to aerobic exercise, HEP, consistency of therapy, and prognosis. Pt and wife aware and agreeable to continue for another month and then reassess. At this time the patient is appropriate to transition to maintenance skilled PT to maintain current gains and prevent regression. Regression is likely, due to history or  regression noted, sedentary lifestyle, presence of neurodegenerative/neurocognitive disorder, and limited ability for family member/caregiver to assist with HEP. Both the patient and caregiver would be at risk for increased injury if patient had LOB or fall due to inadequate safety equipment, which could lead to hospitalization and increased healthcare costs. He is making progress towards all goals and new goals have been created below to reflect new focus of maintenance therapy. Pt and wife in agreement with POC.    Bebeto Huston (2013): a [place] District Court decision that sought to clarify that Medicare will in fact cover skilled therapy services to maintain a patient’s current condition or prevent/slow further deterioration.    Impairments: Abnormal coordination, Abnormal gait, Abnormal muscle tone, Abnormal or restricted ROM, Activity intolerance, Impaired balance, Impaired physical strength, Lacks appropriate HEP, Poor posture, Poor body mechanics, Pain with function, Safety issue, Weight-bearing intolerance, Abnormal movement, Difficulty understanding, Abnormal muscle firing  Understanding of Dx/Px/POC: Good  Prognosis: Good    Patient verbalized understanding of POC.       Please contact me if you have any questions or recommendations. Thank you for the referral and the opportunity to share in Jadon Ann's care.    Plan  Plan details: Will 1-2x/week  Patient would benefit from: PT Eval, Skilled PT, OT Eval, Skilled OT, Speech Eval, and Skilled Speech Therapy  Planned modality interventions: Biofeedback, Cryotherapy, TENS, Thermotherapy  Planned therapy interventions: Abdominal trunk stabilization, ADL training, Balance, Balance/WB training, Breathing training, Body mechanics training, Coordination, Functional ROM exercises, Gait training, HEP, Joint Mobilization, Manual Therapy, Dior taping, Motor coordination training, Neuromuscular re-education, Patient education, Postural training,  Strengthening, Stretching, Therapeutic activities, Therapeutic exercises, Therapeutic training, Transfer training, Activity modification, Work reintegration  Frequency:1- 2x/wk  Duration in weeks: 12  Plan of Care beginning date: 12/5/24  Plan of Care expiration date: 3 months - 3/5/25  Treatment plan discussed with: Patient and Family    Goals  Short Term Goals (4 weeks):    - Patient will improve time on TUG by 2.9 seconds to facilitate improved safety in all ambulation  - Patient will be independent in basic HEP 2-3 weeks  - Patient will improve 5xSTS score by 2.3 seconds to promote improved LE functional strength needed for ADLs    Long Term Goals (12 weeks):  - Patient will be independent in a comprehensive home exercise program  - Patient will improve gait speed by 0.18 m/s to improve safety with community ambulation  - Patient will improve LERMA by 6 points in order to improve static balance and reduce risk for falls  - Patient will be able to demonstrate HT in gait without veering  - Patient will improve 6 Minute Walk Test score by 190 feet to promote improved cardiovascular endurance  - Patient will report 50% reduction in near falls in order to improve safety with functional tasks and reduce his risk for falls  - Patient will report going on walks at least 3 days per week to promote independence and improved cardiovascular endurance  - Patient will be able to ascend/descend stairs reciprocally with 1 UE assist to promote independence and safety with ADLs  - Patient will report 50% reduction in near falls when ambulating on uneven terrain      UPDATED GOALS:  - Patient will ambulate outdoors on uneven surfaces with limited assistance to facilitate safe community ambulation  - Patient will maintain gait speed per 10 meter walk test at 0.6 m/s to facilitate continued safety with community ambulation  - Patient will continue to score at least 46/56 on LERMA to maintain fall risk status to reduce risk of  injury  - Patient will be able to ambulate at least 1,000 ft during 6 Minute Walk Test to maintain current cardiovascular capacity  - Patient will attend PT 1x/week with focus on balance and gait training to maintain functional capacity  - Patient will maintain TUG      Cut off score  All date taken from [employer] Neuro Section or Rehab Measures    Dejesus/56  MDC: 6 pts  Age Norms:  60-69: M - 55   F - 55  70-79: M - 54   F - 53  80-89: M - 53   F - 50 5xSTS: Amina et al 2010  MDC: 2.3 sec  Age Norms:  60-69: 11.4 sec  70-79: 12.6 sec  80-89: 14.8 sec   TUG  MDC: 4.14 sec  Cut off score:  >13.5 sec community dwelling adults  >32.2 frail elderly  <20 I for basic transfers  >30 dependent on transfers 10 Meter Walk Test: Levon and Dwayne monteiro al 2011  MDC: 0.18 m/s  20-29: M - 1.35 m   F - 1.34 m  30-39: M - 1.43 m   F - 1.34 m  40-49: M - 1.43 m   F - 1.39 m  50-59: M - 1.43 m   F - 1.31 m  60-69: M - 1.34 m   F - 1.24 m  70-79: M - 1.26 m   F - 1.13 m  80-89: M - 0.97 m   F - 0.94 m    Household Ambulator < 0.4 m/s  Limited Community Ambulator 0.4 - 0.8 m/s  Community Ambulator 0.8 - 1.2 m/s  Safely cross the street > 1.2 m/s   FGA  MCID: 4 pts  Geriatrics/community < 22/30 fall risk  Geriatrics/community < 20/30 unexplained falls    DGI  MDC: vestibular - 4 pts  MDC: geriatric/community - 3 pts  Falls risk <19/24 mCTSIB  Norm: 20-60 yrs  Eyes open firm: norm sway 0.21-0.48  Eyes closed firm: norm sway 0.48-0.99  Eyes open foam: norm sway 0.38-0.71  Eyes closed foam: norm sway 0.70-2.22   6 Minute Walk Test  MDC: 190.98 ft  MCID: 164 ft    Age Norms  60-69: M - 1876 ft (571.80 m)  F - 1765 ft (537.98 m)  70-79: M - 1729 ft (527.00 m)  F - 1545 ft (470.92 m)  80-89: M - 1368 ft (416.97 m)  F - 1286 ft (391.97 m) ABC: Candi & Haseeb, 2003  <67% increased risk for falls   [place] [employer]  Evaluator Size:        Force (grams):          Hand/Dorsal Thresholds:        Plantar Thresholds:  - 1.65                        - 0.008                       - Normal                                 - Normal  - 2.36                       - 0.02                         - Normal                                 - Normal  - 2.44                       - 0.04                         - Normal                                 - Normal  - 2.83                       - 0.07                         - Normal                                 - Normal  - 3.22                       - 0.16                         - Diminished light touch          - Normal  - 3.61                       - 0.40                         - Diminished light touch          - Normal  - 3.84                       - 0.60                         - Diminished protective           - Diminished light touch  - 4.08                       - 1.00                         - Diminished protective           - Diminished light touch  - 4.17                       - 1.40                         - Diminished protective           - Diminished light touch  - 4.31                       - 2.00                         - Diminished protective           - Diminished light touch  - 4.56                       - 4.00                         - Loss of protective sense      - Diminished protective  - 4.74                       - 6.00                         - Loss of protective sense      - Diminished protective  - 4.93                       - 8.00                         - Loss of protective sense      - Diminished protective  - 5.07                       - 10.0                         - Loss of protective sense     - Loss of protective sense  - 5.18                       - 15.0                         - Loss of protective sense     - Loss of protective sense  - 5.46                       - 26.0                         - Loss of protective sense     - Loss of protective sense  - 5.88                       - 60.0                         - Loss of protective sense     - Loss of protective sense  - 6.10                        - 100                          - Loss of protective sense     - Loss of protective sense  - 6.45                       - 180                          - Loss of protective sense     - Loss of protective sense  - 6.65                       - 300                          - Deep pressure sense only  - Deep pressure sense only         Subjective    History of Present Illness  - Mechanism of injury: has bike at home- interested in going to the gym but is concerned for safety. Has sig PMHx of dementia. Wife present and assisted with history. He requires assist for ADLs and iADLs. Difficulty with sequencing and has FOF.    UPDATE 24: Pt reports they missed last week due to personal issues. They are staying busy at home.     UPDATE 24: Patient and wife arrive to PT. Nothing new to report. Son drove them to PT.     - Primary AD: none    Patient goal: improve confidence with walking    Pain  - Current pain ratin/10    Social Support  - Steps to enter house: 4 JAMARI   - Stairs in house: 2 story- has first floor set up has FF with HR   - Lives with: wife  - Hand dominance: R    Objective  LE MMT  - R Hip Flexion: 4/5   L Hip Flexion: 4/5  - R Knee Extension: 4/5  L Knee Extension: 4/5  - R Knee Flexion: 4-/5   L Knee Flexion: 4-/5  - R Ankle DF: 4/5   L Ankle DF: 4/5  - R Ankle PF: 4/5   L Ankle PF: 4/5    Sensation  - Light touch: wfl    - Temperature: subjective yes    Coordination  - Heel to Shin: unable to complete  - Alternate Toe Taps: unable to complete    Postural Screen  - Observation: moderate forward head and rounded shoulders      Gait  - Abnormalities: dec step length B/L, with Lateral deviation towrads the L       Outcome Measures Initial Eval       5xSTS 31.61 sec no UE 23.91s no UE 20.73s no UE 19.49s   1 episode of freezing/ anxiety about where chair is     TUG  - Regular  - Cognitive   25.53 sec   22.83s    21.5s   17.75s     10 meter 0.62 m/s 0.6m/s 0.61m/s  0.7m/s      FLORENTIN 31/56 42/56 46/56 46/56     mCTSIB  - FTEO (firm)  - FTEC (firm)  - FTEO (foam)  - FTEC (foam)   30 sec  30 sec  NT   30  30       NT NT     6MWT 800 ft GB 900ft GB 1,050ft 1 HHA 1,100ft no HHA just distant supervision     ABC 35.63%                   NuStep 8'  STS with Y MB 10x  Amb holding 6# MB 200ft x3        Precautions: dementia  No past medical history on file.

## 2025-01-09 ENCOUNTER — OFFICE VISIT (OUTPATIENT)
Age: 70
End: 2025-01-09
Payer: COMMERCIAL

## 2025-01-09 DIAGNOSIS — G30.0 EARLY ONSET ALZHEIMER'S DEMENTIA WITHOUT BEHAVIORAL DISTURBANCE (HCC): ICD-10-CM

## 2025-01-09 DIAGNOSIS — R48.8 OTHER SYMBOLIC DYSFUNCTIONS: Primary | ICD-10-CM

## 2025-01-09 DIAGNOSIS — F02.80 EARLY ONSET ALZHEIMER'S DEMENTIA WITHOUT BEHAVIORAL DISTURBANCE (HCC): ICD-10-CM

## 2025-01-09 PROCEDURE — 92507 TX SP LANG VOICE COMM INDIV: CPT

## 2025-01-09 NOTE — PROGRESS NOTES
Daily Speech Treatment Note  Skilled Maintenance    Today's date: 2025   Patient’s name: Jadon Ann  : 1955  MRN: [id number]  Safety measures: Alzheimer's   Referring provider: Abran Elizalde MD    Encounter Diagnosis     ICD-10-CM    1. Other symbolic dysfunctions  R48.8       2. Early onset Alzheimer's dementia without behavioral disturbance (HCC)  G30.0     F02.80             Visit Tracking:   POC expires Unit limit Auth Expiration date PT/OT + Visit Limit?   10/8/24 N/A  NO COG CODES 25 BOMN   25 N/A  NO COG CODES  24 BOMN                                 Visit/Unit Tracking  AUTH Status:  Date    RE  25   RE      No Used  9  1 2 3         Remaining   1  9 8 7             Subjective/Behavioral:  -Patient pleasant and cooperative. Patient's wife present for today's session. No new concerns reported.     Objective/Assessment: Patient demonstrated significant difficulty physically recognizing objects in his hand, manipulating said objects, and following basic directions to move object given max hand-over-hand assist. Resistance felt when given physical assist; explanations for hand-over-hand provided in real time.    Short-term goals:  Patient will participate in continued therapeutic trials with 80% accuracy for continued assessment and recommendations for carryover and functional tasks at home, to be achieved in 4-6 weeks.  24: Patient answered Y/N questions regarding a given topic with 60% accuracy. Accuracy remained at 60% given max cues including clinician models. Patient then answered fixed choice questions regarding the topic with 40% accuracy. Accuracy increased to 70% given max cues.  25: Patient IND provided a food for a each letter of the alphabet with 25% accuracy, IND, for those letters addressed. Task deferred /2 increased frustration and emotional lability.  25: Patient matched shapes with 20% accuracy, IND; 40% with mod verbal  cues; and remained at 40% given max cues including clinician models and hand-over-hand assist.     Patient will improve long term memory retrieval and recall of information during reminiscing tasks for creation of memory book with 80% accuracy, to be achieved in 4-6 weeks.   1/2/2/5: Patient, wife, and clinician continued with development of memory book with patient IND providing 1 of 4 siblings names spontaneously. Patient required a fixed choice of two to recall other siblings names, which he did with 50% accuracy given max cues. Patient, with verbal shaping of responses from wife, was able to provide names and information regarding his parents and their professions.   Patient and family will participate in creation and use of memory book with carry over in 90% of opportunities to facilitate improved overall function and quality of life, to be achieved in 4-6 weeks.   1/2/25: Created segments for family information including parents' names, professions, place of residence, and siblings' names.     Plan:  -Continue with current plan of care.

## 2025-01-13 ENCOUNTER — OFFICE VISIT (OUTPATIENT)
Age: 70
End: 2025-01-13
Payer: COMMERCIAL

## 2025-01-13 DIAGNOSIS — R26.2 AMBULATORY DYSFUNCTION: Primary | ICD-10-CM

## 2025-01-13 PROCEDURE — 97112 NEUROMUSCULAR REEDUCATION: CPT

## 2025-01-16 ENCOUNTER — APPOINTMENT (OUTPATIENT)
Age: 70
End: 2025-01-16
Payer: COMMERCIAL

## 2025-01-20 ENCOUNTER — APPOINTMENT (OUTPATIENT)
Age: 70
End: 2025-01-20
Payer: COMMERCIAL

## 2025-01-23 ENCOUNTER — OFFICE VISIT (OUTPATIENT)
Age: 70
End: 2025-01-23
Payer: COMMERCIAL

## 2025-01-23 DIAGNOSIS — F02.80 EARLY ONSET ALZHEIMER'S DEMENTIA WITHOUT BEHAVIORAL DISTURBANCE (HCC): ICD-10-CM

## 2025-01-23 DIAGNOSIS — G30.0 EARLY ONSET ALZHEIMER'S DEMENTIA WITHOUT BEHAVIORAL DISTURBANCE (HCC): ICD-10-CM

## 2025-01-23 DIAGNOSIS — R26.2 AMBULATORY DYSFUNCTION: Primary | ICD-10-CM

## 2025-01-23 DIAGNOSIS — R48.8 OTHER SYMBOLIC DYSFUNCTIONS: Primary | ICD-10-CM

## 2025-01-23 PROCEDURE — 97112 NEUROMUSCULAR REEDUCATION: CPT

## 2025-01-23 PROCEDURE — 96125 COGNITIVE TEST BY HC PRO: CPT

## 2025-01-23 NOTE — PROGRESS NOTES
Skilled Maintenance Note     Today's date: 2025  Patient name: Jadon Ann  : 1955  MRN: [id number]  Referring provider: Abran Elizalde MD  Dx:   Encounter Diagnosis     ICD-10-CM    1. Ambulatory dysfunction  R26.2                      Subjective: Patient's wife reports no new complaints.       Objective: See treatment diary below Wife present throughout session.    Ankle weights donned 3# ea B/L  NuStep Lvl 2 x11  min, seat 11, UE 10  STS with Y MB x10  Amb holding YMB 200ft x3  Speed amb with L 'x2  Fwd/bwd amb 25ft x 8 laps   Ball toss fwd/bwd 20 catches to PT   Stair  6 laps 1 UE        Assessment:   Patient participated in skilled maintenance PT session focused on maintaining current level of mobility. Patient requires maximal cues to stay on task. Patient does well with hand over hand assist. Patient needs one step commands. Patient requires maximal assist in order to sit. Continue to progress. Patient would continue to benefit from skilled maintenance  PT interventions to maintain current level of function and mobility.  Patient demonstrated fatigue post treatment      Plan: Continue per plan of care.        POC expires Unit limit Auth Expiration date PT/OT + Visit Limit? Co-Insurance   12/23/24       3/5/25                            Visit/Unit Tracking  AUTH Status:  Date 25                     Used                             Remaining

## 2025-01-23 NOTE — PROGRESS NOTES
Speech-Language Pathology Re-Evaluation  Skilled Maintenance Program    Today's date: 2025   Patient’s name: Jadon Ann  : 1955  MRN: [id number]  Safety measures: Alzheimer's   Referring provider: Abran Elizalde MD    Encounter Diagnosis     ICD-10-CM    1. Other symbolic dysfunctions  R48.8       2. Early onset Alzheimer's dementia without behavioral disturbance (HCC)  G30.0     F02.80           Assessment:   Patient continues to present with progressing severe cognitive-linguistic impairment c/b deficits with orientation (personal information, time, place), attention, memory, organization of thoughts, planning, sequencing, problem solving, following basic and multi-step directions, verbal fluency, and limited participation in conversation. Patient continues to require consistent encouragement from clinician and spouse in order to engage socially or he tends to shut down d/t communication difficulties. Patient was administered the [employer] Mental Status (UMS) examination which assesses orientation, memory, attention, and executive functions. The patient received a total score of 2/30, down 1 point from 3/30, when this assessment was given on IE (2024). The patient demonstrates significant difficulty with visual recognition of items in his field and requires hand-over-hand assist to orient to objects in his line of sight, or those placed in his hand. He requires physical assist to follow simple 1-step directions (e.g. to sit in chair, to physically leave tx room, to hold a pencil or piece of paper). It is suspected that the patient would benefit from continuation of outpatient skilled Speech Therapy services to target goals to maintain his current level of cognitive-linguistic functioning, as w/o skilled instruction, patient's skills are highly likely to regress. Patient and wife are in agreement with this plan. Solely, an HEP is not appropriate at this time as he continues  "to require the guidance and clinical decision making from a skilled clinician. Without the skills of a clinician providing these necessary services, patient’s status may decline (e.g., decreased functional recall, reduced attempts with verbal expression, decreased comprehension, socially withdrawn, reduced quality of life, decreased safety, increased frustration, caregiver burden).         Short Term Goals  Patient will participate in continued therapeutic trials with 80% accuracy for continued assessment and recommendations for carryover and functional tasks at home, to be achieved in 4-6 weeks. ONGOING  Patient will improve long term memory retrieval and recall of information during reminiscing tasks for creation of memory book with 80% accuracy, to be achieved in 4-6 weeks. ONGOING  Patient and family will participate in creation and use of memory book with carry over in 90% of opportunities to facilitate improved overall function and quality of life, to be achieved in 4-6 weeks. ONGOING     Long Term Goals  1. Patient will improve functional cognitive-linguistic skills with the implementation of cues and external aids, by discharge. ONGOING  2. Patient will demonstrate adequate memory/reasoning/judgment to perform desired activities in a supervised environment by discharge. ONGOING  3. Patient's family/caregiver will demonstrate implementation of memory book/aides into daily activities to assist with ADLS and improve quality of life. ONGOING       Plan:  Patient would benefit from outpatient skilled Speech Therapy services: Maintenance therapy program    Frequency: 1x weekly  Duration: 3 months    Intervention certification from: 1/8/2025  Intervention certification to: 3/5/2025      Subjective:  - Patient pleasant and cooperative. Patient's wife in attendance for today's session. No new concerns reported.     Patient's goal(s): Patient unable to state. As per patient's wife, \"to preserve what he has, that is " "the main goal.\"    Pain: Absent (scale:  N/A )      Objective (testing):  The [employer] Mental Status (UMS) examination is a 30-point screening questionnaire that tests for orientation, memory, attention, and executive functions. It is used to detect dementia and mild neurocognitive disorder (MNCD). During today’s administration of the test, pt achieved a total score of 2/30 points, which is suggestive of \"dementia\".         Visit Tracking:   POC expires Unit limit Auth Expiration date PT/OT + Visit Limit?   10/8/24 N/A  NO COG CODES 4/18/25 BOMN   1/8/25 N/A  NO COG CODES  12/31/24 BOMN    3/5/25 N/A  NO COG CODES   12/31/25 BOMN                       Visit/Unit Tracking  AUTH Status:  Date  11/25  RE  12/16 1/2/25 1/9/25 1/23  RE         No Used  9  1 2 3  4           Remaining   1  9 8 7  6               Intervention comments:  - 45 minutes standard cognitive-linguistic testing  "

## 2025-01-23 NOTE — PROGRESS NOTES
Daily Speech Treatment Note  Skilled Maintenance    Today's date: 2025   Patient’s name: Jadon Ann  : 1955  MRN: [id number]  Safety measures: Alzheimer's   Referring provider: Abran Elizalde MD    No diagnosis found.        Visit Tracking:   POC expires Unit limit Auth Expiration date PT/OT + Visit Limit?   10/8/24 N/A  NO COG CODES 25 BOMN   25 N/A  NO COG CODES  24 BOMN                                 Visit/Unit Tracking  AUTH Status:  Date    RE  25   RE      No Used  9  1 2 3         Remaining   1  9 8 7             Subjective/Behavioral:  -Patient pleasant and cooperative. Patient's wife present for today's session. No new concerns reported.     Objective/Assessment: Patient demonstrated significant difficulty physically recognizing objects in his hand, manipulating said objects, and following basic directions to move object given max hand-over-hand assist. Resistance felt when given physical assist; explanations for hand-over-hand provided in real time.    Short-term goals:  Patient will participate in continued therapeutic trials with 80% accuracy for continued assessment and recommendations for carryover and functional tasks at home, to be achieved in 4-6 weeks.  24: Patient answered Y/N questions regarding a given topic with 60% accuracy. Accuracy remained at 60% given max cues including clinician models. Patient then answered fixed choice questions regarding the topic with 40% accuracy. Accuracy increased to 70% given max cues.  25: Patient IND provided a food for a each letter of the alphabet with 25% accuracy, IND, for those letters addressed. Task deferred  increased frustration and emotional lability.  25: Patient matched shapes with 20% accuracy, IND; 40% with mod verbal cues; and remained at 40% given max cues including clinician models and hand-over-hand assist.     Patient will improve long term memory retrieval and recall  of information during reminiscing tasks for creation of memory book with 80% accuracy, to be achieved in 4-6 weeks.   1/2/2/5: Patient, wife, and clinician continued with development of memory book with patient IND providing 1 of 4 siblings names spontaneously. Patient required a fixed choice of two to recall other siblings names, which he did with 50% accuracy given max cues. Patient, with verbal shaping of responses from wife, was able to provide names and information regarding his parents and their professions.   Patient and family will participate in creation and use of memory book with carry over in 90% of opportunities to facilitate improved overall function and quality of life, to be achieved in 4-6 weeks.   1/2/25: Created segments for family information including parents' names, professions, place of residence, and siblings' names.     Plan:  -Continue with current plan of care.

## 2025-01-27 ENCOUNTER — APPOINTMENT (OUTPATIENT)
Age: 70
End: 2025-01-27
Payer: COMMERCIAL

## 2025-01-30 ENCOUNTER — APPOINTMENT (OUTPATIENT)
Age: 70
End: 2025-01-30
Payer: COMMERCIAL

## 2025-01-31 ENCOUNTER — TELEPHONE (OUTPATIENT)
Age: 70
End: 2025-01-31

## 2025-01-31 NOTE — TELEPHONE ENCOUNTER
Patient's wife, Zoey (on consent) called to express her dissatisfaction  at patient only receiving speech therapy once a week; said he has PT twice a week. Ptient's wife feels may be r/t staffing.     Per ST note:  Plan:  Patient would benefit from outpatient skilled Speech Therapy services: Maintenance therapy program     Frequency: 1x weekly  Duration: 3 months      I let patient's wife know I was messaging  for more information in order to better assist.  Message sent to MEEK Henderson below:    Hi! I am a nurse in neurology, Dr. Elizalde's office. patient's wife (Zoey) was asking if patient can have more than once a week speech therapy; she feels he would benefit if frequency could be increased. Patient's wife feels patient may only be scheduled once a week because of low staffing? Let me us know what you think or you can reach out to her direct. thank you!!!       Awaiting response.

## 2025-02-03 ENCOUNTER — OFFICE VISIT (OUTPATIENT)
Age: 70
End: 2025-02-03
Payer: COMMERCIAL

## 2025-02-03 DIAGNOSIS — R26.2 AMBULATORY DYSFUNCTION: Primary | ICD-10-CM

## 2025-02-03 PROCEDURE — 97112 NEUROMUSCULAR REEDUCATION: CPT

## 2025-02-03 NOTE — TELEPHONE ENCOUNTER
Abran Elizalde MD  You3 days ago       Thank you will wait for the response from speech therapy and if needed they can increase the frequency    Thank you   _________  Awaiting response from OT

## 2025-02-03 NOTE — PROGRESS NOTES
Skilled Maintenance Note     Today's date: 2/3/2025  Patient name: Jadon Ann  : 1955  MRN: [id number]  Referring provider: Abran Elizalde MD  Dx:   Encounter Diagnosis     ICD-10-CM    1. Ambulatory dysfunction  R26.2                      Subjective: Patient's wife reports no new complaints from patient.       Objective: See treatment diary below Wife present throughout session.    Ankle weights donned 3# ea B/L  NuStep Lvl 2 x11  min, seat 11, UE 10  STS with Y MB x10  Amb holding YMB 200ft x3  Speed amb with L 'x2  Fwd/bwd amb 25ft x 8 laps   Ball toss fwd/bwd 20 catches to PT   Stair  6 laps 1 UE    Assessment:   Patient participated in skilled maintenance PT session focused on maintaining current level of mobility. Max and consistent cueing required. Significant amount of time spent completing patient and family education about POC, maintenance program, frequency, and recommendations. Wife inquiring about private pay options, discussed safety, 1:1 supervision, home health, familiar environment benefits. Also discussed diagnosis and prognosis. Wife Zoey agreeable to continue PT POC 1x/week for maintenance therapy. Patient would continue to benefit from skilled maintenance  PT interventions to maintain current level of function and mobility.  Patient demonstrated fatigue post treatment      Plan: Continue per plan of care.        POC expires Unit limit Auth Expiration date PT/OT + Visit Limit? Co-Insurance   12/23/24       3/5/25                            Visit/Unit Tracking  AUTH Status:  Date 25                     Used                             Remaining

## 2025-02-04 NOTE — TELEPHONE ENCOUNTER
Received message from Caitlin Odell, SLP    Good morning, Macrina! Thank you for reaching out regarding Jadon and his POC. I had spoken with Jadon's wife, Zoey, a couple of times over the course of the POC; particularly when Jadon was transitioned to a skilled maintenance program, to explain the rationale as to why he is only seen 1x a week. At this time, given Jadon's PLOF, state of his neurodegenerative process, and immense difficulty participating in OP skilled maintenance, it is recommended he stay at 1x a week. This is not due to low staffing; this is specific to Jadon's current level of functioning and his need for maximal assist to participate in therapeutic activities. The patient and his wife have very specific times preferences. They have been offered an alternate clinician (SLP) to provide services to him if/when I am not available, of which they have not utilized to date. Our assistant facility director spoke with Jadon's wife, Zoey, last night, and she was agreeable to 1x a week for both speech and PT. We had reviewed alternate options, such as home health or private pay services, if she desires additional therapy for him. However, she is agreeable to 1x a week at this time. Thank you for your time and attention to this matter. I am available to answer any more questions you may have! Have a wonderful day!

## 2025-02-05 ENCOUNTER — OFFICE VISIT (OUTPATIENT)
Age: 70
End: 2025-02-05
Payer: COMMERCIAL

## 2025-02-05 DIAGNOSIS — R48.8 OTHER SYMBOLIC DYSFUNCTIONS: Primary | ICD-10-CM

## 2025-02-05 DIAGNOSIS — G30.0 EARLY ONSET ALZHEIMER'S DEMENTIA WITHOUT BEHAVIORAL DISTURBANCE (HCC): ICD-10-CM

## 2025-02-05 DIAGNOSIS — F02.80 EARLY ONSET ALZHEIMER'S DEMENTIA WITHOUT BEHAVIORAL DISTURBANCE (HCC): ICD-10-CM

## 2025-02-05 PROCEDURE — 92507 TX SP LANG VOICE COMM INDIV: CPT

## 2025-02-05 NOTE — TELEPHONE ENCOUNTER
Abran Elizalde MD  You23 hours ago (12:20 PM)       Thank you so then lets just continue with once a week as recommended

## 2025-02-05 NOTE — PROGRESS NOTES
Daily Speech Treatment Note  Skilled Maintenance Program  Today's date: 2025   Patient’s name: Jadon Ann  : 1955  MRN: [id number]  Safety measures:  Alzheimer's   Referring provider: Abran Elizalde MD    Encounter Diagnosis     ICD-10-CM    1. Other symbolic dysfunctions  R48.8       2. Early onset Alzheimer's dementia without behavioral disturbance (HCC)  G30.0     F02.80              Visit Tracking:   POC expires Unit limit Auth Expiration date PT/OT + Visit Limit?   10/8/24 N/A  NO COG CODES 25 BOMN   25 N/A  NO COG CODES  24 BOMN    3/5/25 N/A  NO COG CODES   25 BOMN                       Visit/Unit Tracking  AUTH Status:  Date    RE  25  RE         No Used  9  1 2 3  4  1         Remaining   1  9 8 7  6  9             Subjective/Behavioral:  -Patient pleasant and cooperative. Patient's wife in attendance for today's session. Patient's wife contacted referring neuro provider to request an increase in services. Clinician reiterated rationale regarding frequency recommendation given patient's PLOF, which was provided several times over the course of his POC, particularly when he transitioned from a skilled tx to a skilled maintenance program. Patient's wife was also provided with alternate options including home health services and private pay tx in the home if she is seeking extra support. Patient's wife also provided with information regarding home health services, including the pro's and con's of utilizing home health for patients living with dementia. Patient's wife agreeable to 1x a week of skilled maintenance at this clinic at this time.     Objective/Assessment:  -Patient's family member/caregiver was present during today's session.  -Reviewed testing results and goals in plan care with patient. Patient is in agreement at this time.    Short-term goals:  Patient will participate in continued therapeutic trials with 80% accuracy for  continued assessment and recommendations for carryover and functional tasks at home, to be achieved in 4-6 weeks.   2/5/25: Patient IND identified colors with 50% accuracy, which increased to 100% given mod verbal prompts including fixed choice of 2. Patient then sorted various items according to color with 25% accuracy, IND. Accuracy increased to 100% given max cues including hand-over-hand physical assist.     Patient will improve long term memory retrieval and recall of information during reminiscing tasks for creation of memory book with 80% accuracy, to be achieved in 4-6 weeks.   Patient and family will participate in creation and use of memory book with carry over in 90% of opportunities to facilitate improved overall function and quality of life, to be achieved in 4-6 weeks    Plan:  -Continue with current plan of care.

## 2025-02-06 DIAGNOSIS — G30.0 EARLY ONSET ALZHEIMER'S DEMENTIA WITHOUT BEHAVIORAL DISTURBANCE (HCC): ICD-10-CM

## 2025-02-06 DIAGNOSIS — F02.80 EARLY ONSET ALZHEIMER'S DEMENTIA WITHOUT BEHAVIORAL DISTURBANCE (HCC): ICD-10-CM

## 2025-02-06 RX ORDER — DONEPEZIL HYDROCHLORIDE 10 MG/1
10 TABLET, FILM COATED ORAL
Qty: 90 TABLET | Refills: 2 | Status: SHIPPED | OUTPATIENT
Start: 2025-02-06

## 2025-02-10 ENCOUNTER — APPOINTMENT (OUTPATIENT)
Age: 70
End: 2025-02-10
Payer: COMMERCIAL

## 2025-02-12 ENCOUNTER — APPOINTMENT (OUTPATIENT)
Age: 70
End: 2025-02-12
Payer: COMMERCIAL

## 2025-02-14 ENCOUNTER — APPOINTMENT (OUTPATIENT)
Age: 70
End: 2025-02-14
Payer: COMMERCIAL

## 2025-02-17 ENCOUNTER — OFFICE VISIT (OUTPATIENT)
Age: 70
End: 2025-02-17
Payer: COMMERCIAL

## 2025-02-17 DIAGNOSIS — G30.0 EARLY ONSET ALZHEIMER'S DEMENTIA WITHOUT BEHAVIORAL DISTURBANCE (HCC): ICD-10-CM

## 2025-02-17 DIAGNOSIS — R48.8 OTHER SYMBOLIC DYSFUNCTIONS: Primary | ICD-10-CM

## 2025-02-17 DIAGNOSIS — F02.80 EARLY ONSET ALZHEIMER'S DEMENTIA WITHOUT BEHAVIORAL DISTURBANCE (HCC): ICD-10-CM

## 2025-02-17 PROCEDURE — 92507 TX SP LANG VOICE COMM INDIV: CPT

## 2025-02-17 NOTE — PROGRESS NOTES
"Daily Speech Treatment Note  Skilled Maintenance Program  Today's date: 2025   Patient’s name: Jadon Ann  : 1955  MRN: [id number]  Safety measures:  Alzheimer's   Referring provider: Abran Elizalde MD    Encounter Diagnosis     ICD-10-CM    1. Other symbolic dysfunctions  R48.8       2. Early onset Alzheimer's dementia without behavioral disturbance (HCC)  G30.0     F02.80             Visit Tracking:   POC expires Unit limit Auth Expiration date PT/OT + Visit Limit?   10/8/24 N/A  NO COG CODES 25 BOMN   25 N/A  NO COG CODES  24 BOMN    3/5/25 N/A  NO COG CODES   25 BOMN                       Visit/Unit Tracking  AUTH Status:  Date    RE  25  RE       No Used  9  1 2 3  4  1  2       Remaining   1  9 8 7  6  9  8           Subjective/Behavioral:  -Patient pleasant and cooperative. Patient's wife in attendance for today's session. When asked how he was doing, patient stated, \"bad news\". Patient appeared sullen today; reported he was feeling down.     Objective/Assessment: Utilized gross motor movements to facilitate improved generative naming.      Short-term goals:  Patient will participate in continued therapeutic trials with 80% accuracy for continued assessment and recommendations for carryover and functional tasks at home, to be achieved in 4-6 weeks.   25: Patient IND identified colors with 50% accuracy, which increased to 100% given mod verbal prompts including fixed choice of 2. Patient then sorted various items according to color with 25% accuracy, IND. Accuracy increased to 100% given max cues including hand-over-hand physical assist.   25: Attempted to engage patient in card game, \"War\". During task, patient labeled cards with 50% accuracy, IND. Accuracy increased to 100% given mod-max cues including verbal choice of two and clinician models. Patient stated larger number with 67% accuracy, IND; 100% given mod verbal " cues. Patient then engaged in gross motor task (walking around PT gym) for generative naming task. Patient asked to name a food starting with a letter in the alphabet. Patient named 0 foods, IND, for first 9 letters of the alphabet. Accuracy increased to 9/9 given mod verbal semantic cues, sentence/responsive naming cues, and fixed choice of 2.    Patient will improve long term memory retrieval and recall of information during reminiscing tasks for creation of memory book with 80% accuracy, to be achieved in 4-6 weeks.   Patient and family will participate in creation and use of memory book with carry over in 90% of opportunities to facilitate improved overall function and quality of life, to be achieved in 4-6 weeks    Plan:  -Continue with current plan of care.

## 2025-02-19 ENCOUNTER — APPOINTMENT (OUTPATIENT)
Age: 70
End: 2025-02-19
Payer: COMMERCIAL

## 2025-02-19 ENCOUNTER — OFFICE VISIT (OUTPATIENT)
Age: 70
End: 2025-02-19
Payer: COMMERCIAL

## 2025-02-19 DIAGNOSIS — R26.2 AMBULATORY DYSFUNCTION: Primary | ICD-10-CM

## 2025-02-19 PROCEDURE — 97112 NEUROMUSCULAR REEDUCATION: CPT

## 2025-02-19 NOTE — PROGRESS NOTES
Skilled Maintenance Note     Today's date: 2025  Patient name: Jadon Ann  : 1955  MRN: [id number]  Referring provider: Abran Elizalde MD  Dx:   Encounter Diagnosis     ICD-10-CM    1. Ambulatory dysfunction  R26.2                      Subjective: Patient's wife reports no new complaints from patient.       Objective: See treatment diary below Wife present throughout session.    Ankle weights donned 3# ea B/L  NuStep Lvl 2 x15 min, seat 11, UE 10  STS with Y MB x10  Amb holding YMB 200ft x3  Speed amb with L 'x2  Fwd/bwd amb 25ft x 8 laps   Ball toss fwd/bwd 20 catches to PT   Stair  6 laps 1 UE    Assessment:   Patient participated in skilled maintenance PT session focused on maintaining current level of mobility. Max and consistent cueing required. Patient has a fear of falling and has to be max assisted in order to sit. Patient did well with ball toss but was unable to walk backwards and catch the ball at the same time. . Patient would continue to benefit from skilled maintenance  PT interventions to maintain current level of function and mobility.  Patient demonstrated fatigue post treatment      Plan: Continue per plan of care.        POC expires Unit limit Auth Expiration date PT/OT + Visit Limit? Co-Insurance   12/23/24       3/5/25                            Visit/Unit Tracking  AUTH Status:  Date 1/6/25 1/13 1/23 2/3 2/19                  Used                             Remaining

## 2025-02-24 ENCOUNTER — OFFICE VISIT (OUTPATIENT)
Age: 70
End: 2025-02-24
Payer: COMMERCIAL

## 2025-02-24 DIAGNOSIS — F02.80 EARLY ONSET ALZHEIMER'S DEMENTIA WITHOUT BEHAVIORAL DISTURBANCE (HCC): ICD-10-CM

## 2025-02-24 DIAGNOSIS — R48.8 OTHER SYMBOLIC DYSFUNCTIONS: Primary | ICD-10-CM

## 2025-02-24 DIAGNOSIS — G30.0 EARLY ONSET ALZHEIMER'S DEMENTIA WITHOUT BEHAVIORAL DISTURBANCE (HCC): ICD-10-CM

## 2025-02-24 DIAGNOSIS — R26.2 AMBULATORY DYSFUNCTION: Primary | ICD-10-CM

## 2025-02-24 PROCEDURE — 92507 TX SP LANG VOICE COMM INDIV: CPT

## 2025-02-24 PROCEDURE — 97112 NEUROMUSCULAR REEDUCATION: CPT

## 2025-02-24 NOTE — PROGRESS NOTES
Speech-Language Pathology Progress Update  Skilled Maintenance Program    Today's date: 2025   Patient’s name: Jadon Ann  : 1955  MRN: [id number]  Safety measures: Alzheimer's   Referring provider: Abran Elizalde MD    Encounter Diagnosis     ICD-10-CM    1. Other symbolic dysfunctions  R48.8       2. Early onset Alzheimer's dementia without behavioral disturbance (HCC)  G30.0     F02.80              Assessment:   Patient continues to present with progressing severe cognitive-linguistic impairment c/b deficits with orientation (personal information, time, place), attention, memory, organization of thoughts, planning, sequencing, problem solving, following basic and multi-step directions, verbal fluency, and limited participation in conversation. Patient continues to require consistent encouragement from clinician and spouse in order to engage socially or he tends to shut down d/t communication difficulties. Patient often requires max multimodal cues to orient to expectations of task, follow basic directions, and visually recognize objects placed in his line of sight or in his hand. It is suspected that the patient would benefit from continuation of outpatient skilled Speech Therapy services to target goals to maintain his current level of cognitive-linguistic functioning, as w/o skilled instruction, patient's skills are highly likely to regress. Patient and wife are in agreement with this plan. Solely, an HEP is not appropriate at this time as he continues to require the guidance and clinical decision making from a skilled clinician. Without the skills of a clinician providing these necessary services, patient’s status may decline (e.g., decreased functional recall, reduced attempts with verbal expression, decreased comprehension, socially withdrawn, reduced quality of life, decreased safety, increased frustration, caregiver burden).         Short Term Goals  Patient will participate in  "continued therapeutic trials with 80% accuracy for continued assessment and recommendations for carryover and functional tasks at home, to be achieved in 4-6 weeks. ONGOING  Patient will improve long term memory retrieval and recall of information during reminiscing tasks for creation of memory book with 80% accuracy, to be achieved in 4-6 weeks. ONGOING  Patient and family will participate in creation and use of memory book with carry over in 90% of opportunities to facilitate improved overall function and quality of life, to be achieved in 4-6 weeks. ONGOING     Long Term Goals  1. Patient will improve functional cognitive-linguistic skills with the implementation of cues and external aids, by discharge. ONGOING  2. Patient will demonstrate adequate memory/reasoning/judgment to perform desired activities in a supervised environment by discharge. ONGOING  3. Patient's family/caregiver will demonstrate implementation of memory book/aides into daily activities to assist with ADLS and improve quality of life. ONGOING       Plan:  Patient would benefit from outpatient skilled Speech Therapy services: Maintenance therapy program    Frequency: 1x weekly  Duration: 1-2 weeks    Intervention certification from: 2/24/2025  Intervention certification to: 3/5/2025      Subjective:  -Patient pleasant, engaged, and cooperative. Patient's wife present for today's session. No new concerns reported.       Patient's goal(s): As per patient's wife, \"to keep what he has\".     Pain: Absent (scale:  N/A )      Objective (testing):  No formal testing administered as today's session serves as a progress update.         Treatment:  Patient will participate in continued therapeutic trials with 80% accuracy for continued assessment and recommendations for carryover and functional tasks at home, to be achieved in 4-6 weeks.  2/24/25: Patient completed confrontational naming tasks with 23% accuracy, IND; 54% accuracy with mod verbal cues; 77% " accuracy given max cues including clinician models. Patient stated the fx of a common item with 54% accuracy, IND; 69% with mod verbal cues; 92% with max cues. Patient then sorted objects by color with 64% accuracy, IND. Accuracy increased to 100% given mod verbal cues including semantic cues, gestures, choice of two, and verbal shaping of answer.         Visit Tracking:   POC expires Unit limit Auth Expiration date PT/OT + Visit Limit?   10/8/24 N/A  NO COG CODES 4/18/25 BOMN   1/8/25 N/A  NO COG CODES  12/31/24 BOMN    3/5/25 N/A  NO COG CODES   12/31/25 BOMN                       Visit/Unit Tracking  AUTH Status:  Date  11/25  RE  12/16 1/2/25 1/9/25 1/23  RE  2/5 2/17 2/24   PU     No Used  9  1 2 3  4  1  2  3       Remaining   1  9 8 7  6  9  8  7           Intervention comments:  - 45 minutes speech/language tx

## 2025-02-25 NOTE — PROGRESS NOTES
Skilled Maintenance Note     Today's date: 2025  Patient name: Jadon Ann  : 1955  MRN: [id number]  Referring provider: Abran Elizalde MD  Dx:   Encounter Diagnosis     ICD-10-CM    1. Ambulatory dysfunction  R26.2               Subjective: Patient's wife reports he has good and bad days. OK today      Objective: See treatment diary below Wife present throughout session.    Ankle weights donned 4# ea B/L  NuStep Lvl 2 x15 min, seat 11, UE 10  Amb holding 6# large MB 200ft x3  Speed amb with L 'x2  Fwd/bwd amb 25ft x 8 laps   Ball toss fwd/bwd 20 catches to PT   Stair  6 laps 1 UE  Floor pickup bean bags 10x with DT cog    Assessment:   Patient participated in skilled maintenance PT session focused on maintaining current level of mobility. Max and consistent cueing required. Improved STS transfers. Noted significant fatigue at end of session and patient request to end. Wife and son present throughout.  Patient would continue to benefit from skilled maintenance  PT interventions to maintain current level of function and mobility.  Patient demonstrated fatigue post treatment      Plan: Continue per plan of care.        POC expires Unit limit Auth Expiration date PT/OT + Visit Limit? Co-Insurance   12/23/24       3/5/25                            Visit/Unit Tracking  AUTH Status:  Date 1/6/25 1/13 1/23 2/3 2/19 2/24                 Used                             Remaining

## 2025-02-26 ENCOUNTER — EVALUATION (OUTPATIENT)
Age: 70
End: 2025-02-26
Payer: COMMERCIAL

## 2025-02-26 DIAGNOSIS — R26.2 AMBULATORY DYSFUNCTION: Primary | ICD-10-CM

## 2025-02-26 PROCEDURE — 97164 PT RE-EVAL EST PLAN CARE: CPT

## 2025-02-26 PROCEDURE — 97112 NEUROMUSCULAR REEDUCATION: CPT

## 2025-03-05 ENCOUNTER — OFFICE VISIT (OUTPATIENT)
Age: 70
End: 2025-03-05
Payer: COMMERCIAL

## 2025-03-05 DIAGNOSIS — R48.8 OTHER SYMBOLIC DYSFUNCTIONS: Primary | ICD-10-CM

## 2025-03-05 DIAGNOSIS — G30.0 EARLY ONSET ALZHEIMER'S DEMENTIA WITHOUT BEHAVIORAL DISTURBANCE (HCC): ICD-10-CM

## 2025-03-05 DIAGNOSIS — F02.80 EARLY ONSET ALZHEIMER'S DEMENTIA WITHOUT BEHAVIORAL DISTURBANCE (HCC): ICD-10-CM

## 2025-03-05 PROCEDURE — 92507 TX SP LANG VOICE COMM INDIV: CPT

## 2025-03-05 NOTE — PROGRESS NOTES
"Daily Speech Treatment Note  Skilled Maintenance Program    Today's date: 3/5/2025   Patient’s name: Jadon Ann  : 1955  MRN: [id number]  Safety measures: Alzheimer's   Referring provider: Abran Elizalde MD    Encounter Diagnosis     ICD-10-CM    1. Other symbolic dysfunctions  R48.8       2. Early onset Alzheimer's dementia without behavioral disturbance (HCC)  G30.0     F02.80           Visit Tracking:   POC expires Unit limit Auth Expiration date PT/OT + Visit Limit?   10/8/24 N/A  NO COG CODES 25 BOMN   25 N/A  NO COG CODES  24 BOMN    3/5/25 N/A  NO COG CODES   25 BOMN                       Visit/Unit Tracking  AUTH Status:  Date    RE  25  RE     PU  3/5     No Used  9  1 2 3  4  1  2  3  1       Remaining   1  9 8 7  6  9  8  7  9           Subjective/Behavioral:  -Patient pleasant and cooperative. Patient's wife present for today's session. Patient's wife reported \"insurance overrides ability to retain  services\".    Objective/Assessment:  -Patient's family member/caregiver was present during today's session.  -Reviewed testing results and goals in plan care with patient. Patient is in agreement at this time.    Short-term goals:  Patient will participate in continued therapeutic trials with 80% accuracy for continued assessment and recommendations for carryover and functional tasks at home, to be achieved in 4-6 weeks.   3/5/25: Patient completed confrontational naming tasks with 67% accuracy given max cues including clinician models. Patient then named parts to a common/functional item given a picture card of object with 0% accuracy with visual cue. Accuracy increased to 70% given mod verbal cues including semantic cues and choice of two. Accuracy then increased to 80% given max cues including clinician models.   Patient will improve long term memory retrieval and recall of information during reminiscing tasks for creation " of memory book with 80% accuracy, to be achieved in 4-6 weeks.   Patient and family will participate in creation and use of memory book with carry over in 90% of opportunities to facilitate improved overall function and quality of life, to be achieved in 4-6 weeks    Plan:  -Continue with current plan of care.

## 2025-03-06 ENCOUNTER — OFFICE VISIT (OUTPATIENT)
Age: 70
End: 2025-03-06
Payer: COMMERCIAL

## 2025-03-06 DIAGNOSIS — R26.2 AMBULATORY DYSFUNCTION: Primary | ICD-10-CM

## 2025-03-06 PROCEDURE — 97112 NEUROMUSCULAR REEDUCATION: CPT

## 2025-03-06 NOTE — PROGRESS NOTES
Skilled Maintenance Note     Today's date: 3/6/2025  Patient name: Jadon Ann  : 1955  MRN: [id number]  Referring provider: Abran Elizalde MD  Dx:   Encounter Diagnosis     ICD-10-CM    1. Ambulatory dysfunction  R26.2                 Subjective: Patient's wife reports he has good and bad days. OK today      Objective: See treatment diary below Wife present throughout session.    Ankle weights donned 4# ea B/L  NuStep Lvl 2 x15 min, seat 11, UE 10  Amb holding 6# large MB 200ft x3  Speed amb with L 'x2  Full flight 5 + 14 with HR   Fwd/bwd amb 25ft x 8 laps   STS 4x8 repetitions   Ball toss fwd/bwd 20 catches to PT     Assessment:   Patient participated in skilled maintenance PT session focused on maintaining current level of mobility. Max and consistent cueing required. Improved STS transfers as well as stair negotiation. Able to copmlete FF with R HHA and L HR for full flight and with visual cueing from wife. Wife or son present throughout.  Patient would continue to benefit from skilled maintenance  PT interventions to maintain current level of function and mobility.  Patient demonstrated fatigue post treatment      Plan: Continue per plan of care.        POC expires Unit limit Auth Expiration date PT/OT + Visit Limit? Co-Insurance   12/23/24       3/5/25       5/26/25                     Visit/Unit Tracking  AUTH Status:  Date 25 2/3 2/19 2/24  3/6               Used                             Remaining

## 2025-03-12 ENCOUNTER — OFFICE VISIT (OUTPATIENT)
Age: 70
End: 2025-03-12
Payer: COMMERCIAL

## 2025-03-12 DIAGNOSIS — R26.2 AMBULATORY DYSFUNCTION: Primary | ICD-10-CM

## 2025-03-12 PROCEDURE — 97110 THERAPEUTIC EXERCISES: CPT

## 2025-03-12 PROCEDURE — 97112 NEUROMUSCULAR REEDUCATION: CPT

## 2025-03-12 NOTE — PROGRESS NOTES
Skilled Maintenance Note     Today's date: 3/12/2025  Patient name: Jadon Ann  : 1955  MRN: [id number]  Referring provider: Abran Elizalde MD  Dx:   Encounter Diagnosis     ICD-10-CM    1. Ambulatory dysfunction  R26.2                 Subjective: Patient's wife reports he has good and bad days. OK today       Objective: See treatment diary below Wife present throughout session.    Ankle weights donned 4# ea B/L   NuStep Lvl 2 x15 min, seat 11, UE 10  Fast walking w/ PT pacing x3 laps around clinic   Amb holding 10# TT 2 laps around clinic  Stairs (in clinic) x7 laps w/ handrail  Side stepping w/ PT HHA x2 laps 25'  Fwd/bwd amb 25ft x 4 laps   STS 3x8 repetitions   Ball toss fwd/bwd 20 catches to PT     Assessment:   Patient participated in skilled maintenance PT session focused on maintaining current level of mobility. Max and consistent cueing required. Wife or son present throughout.  Patient would continue to benefit from skilled maintenance  PT interventions to maintain current level of function and mobility.  Patient demonstrated fatigue post treatment        Plan: Continue per plan of care.        POC expires Unit limit Auth Expiration date PT/OT + Visit Limit? Co-Insurance   12/23/24       3/5/25       5/26/25                     Visit/Unit Tracking  AUTH Status:  Date 1/6/25 1/13 1/23 2/3 2/19 2/24  3/6  3/12             Used                             Remaining

## 2025-03-15 DIAGNOSIS — G30.0 EARLY ONSET ALZHEIMER'S DEMENTIA WITHOUT BEHAVIORAL DISTURBANCE (HCC): ICD-10-CM

## 2025-03-15 DIAGNOSIS — F02.80 EARLY ONSET ALZHEIMER'S DEMENTIA WITHOUT BEHAVIORAL DISTURBANCE (HCC): ICD-10-CM

## 2025-03-17 RX ORDER — MEMANTINE HYDROCHLORIDE 10 MG/1
10 TABLET ORAL 2 TIMES DAILY
Qty: 180 TABLET | Refills: 0 | Status: SHIPPED | OUTPATIENT
Start: 2025-03-17

## 2025-03-18 ENCOUNTER — TELEPHONE (OUTPATIENT)
Age: 70
End: 2025-03-18

## 2025-03-18 DIAGNOSIS — F02.C0 SEVERE EARLY ONSET ALZHEIMER'S DEMENTIA, UNSPECIFIED WHETHER BEHAVIORAL, PSYCHOTIC, OR MOOD DISTURBANCE OR ANXIETY (HCC): Primary | ICD-10-CM

## 2025-03-18 DIAGNOSIS — G30.0 SEVERE EARLY ONSET ALZHEIMER'S DEMENTIA, UNSPECIFIED WHETHER BEHAVIORAL, PSYCHOTIC, OR MOOD DISTURBANCE OR ANXIETY (HCC): Primary | ICD-10-CM

## 2025-03-18 NOTE — TELEPHONE ENCOUNTER
Follow up call to patient's spouse Zoey regarding her request for Home Care order for the patient. . Left message for Zoey to call back to discuss same. (Communication Consent on file.)

## 2025-03-18 NOTE — TELEPHONE ENCOUNTER
Patient's wife requesting a referral script for home care from [phone].  Patient didn't know the name.  Please assist.

## 2025-03-18 NOTE — LETTER
3/25/2025    Please see attached referral for home PT, OT and ST.  Not sure if the office visit note is too far in the past or not.  Please let me know if you need anything.  Thank you,     Erendira Rodriguez  Outpatient   [facility]  [phone]  [email or web address]

## 2025-03-18 NOTE — TELEPHONE ENCOUNTER
Pt's wife called on the triage line returning [facility] call. She explains that pt has been going for therapy at the only location in their area for some time now. And while it definitely has been beneficial for the pt, the therapist suggested that pt should try in-home PT, OT and Speech as he would likely be more comfortable and benefit from therapy more in familiar surroundings.    Pt's wife is asking if Dr. Elizalde could please place referrals for all 3 therapies as home-care or in-home therapy with [facility].    Please place referrals if agreeable. Thank you.

## 2025-03-19 ENCOUNTER — APPOINTMENT (OUTPATIENT)
Age: 70
End: 2025-03-19
Payer: COMMERCIAL

## 2025-03-20 ENCOUNTER — APPOINTMENT (OUTPATIENT)
Age: 70
End: 2025-03-20
Payer: COMMERCIAL

## 2025-03-20 DIAGNOSIS — G30.0 EARLY ONSET ALZHEIMER'S DEMENTIA WITHOUT BEHAVIORAL DISTURBANCE (HCC): ICD-10-CM

## 2025-03-20 DIAGNOSIS — F02.80 EARLY ONSET ALZHEIMER'S DEMENTIA WITHOUT BEHAVIORAL DISTURBANCE (HCC): ICD-10-CM

## 2025-03-21 RX ORDER — MEMANTINE HYDROCHLORIDE 10 MG/1
10 TABLET ORAL 2 TIMES DAILY
Qty: 180 TABLET | Refills: 0 | OUTPATIENT
Start: 2025-03-21

## 2025-03-21 NOTE — TELEPHONE ENCOUNTER
Patients wife Ozey returning missed call. Zoey stated she was not able to understand the message that was left, it was unclear. Zoey is asking for a call back please.

## 2025-03-24 NOTE — TELEPHONE ENCOUNTER
SW sent referral to [facility] and waiting to hear back.  SW remains available.

## 2025-03-24 NOTE — TELEPHONE ENCOUNTER
03/24/25    Patient wife called office retuning Voice Message left.    Confirmed message and related the Message again to patient wife.    Patient Wife UNDERSTOOD and AGREED / Gave the OK for Referral to be send to all the Facilities that were suggested on message to check whom would take Patient Care and Insurance is Accepted.       Please Fax Referral to All Suggested Facilities and contact Patient wife with any status in the matter.    Thank You.

## 2025-03-24 NOTE — TELEPHONE ENCOUNTER
Per Home health referral note-  Note:  IB message sent to MD: I am sorry we are currently restricted in this particular area for the disciplines being request due to our current census.  We apologize for this inconvenience.   May I suggest trying either [facility] or [facility]    -----------------------------------------------------  Left detailed message for pt wife making her aware of referral note and asked that she call our office back if she would like us to fax referral to one of these Home health agencies.

## 2025-03-24 NOTE — TELEPHONE ENCOUNTER
Abran Elizalde MD to Macario Vergara, RN       3/18/25  3:06 PM  I did put in a new order for home health care    Thank you   Quality 431: Preventive Care And Screening: Unhealthy Alcohol Use - Screening: Patient not identified as an unhealthy alcohol user when screened for unhealthy alcohol use using a systematic screening method Quality 402: Tobacco Use And Help With Quitting Among Adolescents: Patient screened for tobacco and never smoked Detail Level: Detailed Quality 226: Preventive Care And Screening: Tobacco Use: Screening And Cessation Intervention: Patient screened for tobacco use and is an ex/non-smoker

## 2025-03-25 NOTE — TELEPHONE ENCOUNTER
[facility] responded they are processing the referral and will get back to this writer with details and if they are able to take patient for services.  SW remains available.

## 2025-03-25 NOTE — TELEPHONE ENCOUNTER
AYUSH informed that [facility] will accept the referral, start of care anticipated for Thursday, 3/27 at latest.  AYUSH canceled [facility] referral.  Awaiting SOC for patient.  AYUSH called patient and left message with update.  SW remains available.

## 2025-03-25 NOTE — TELEPHONE ENCOUNTER
Community message to [facility] sent for referral.  AYUSH also faxed referral this date.      AYUSH called patient's spouse, Zoey at [phone].  Updated on above.  Also informed would send referral to [facility].  Zoey made aware patient may need a more recent office visit note in order for home care to accept a referral.  Open to referrals to other agencies to find one that takes insurance and able to go to patient's area.     Referral sent to [facility].  AYUSH awaiting response.

## 2025-03-26 ENCOUNTER — OFFICE VISIT (OUTPATIENT)
Age: 70
End: 2025-03-26
Payer: COMMERCIAL

## 2025-03-26 DIAGNOSIS — R48.8 OTHER SYMBOLIC DYSFUNCTIONS: ICD-10-CM

## 2025-03-26 DIAGNOSIS — R41.841 COGNITIVE COMMUNICATION DEFICIT: Primary | ICD-10-CM

## 2025-03-26 DIAGNOSIS — G30.0 EARLY ONSET ALZHEIMER'S DEMENTIA WITHOUT BEHAVIORAL DISTURBANCE (HCC): ICD-10-CM

## 2025-03-26 DIAGNOSIS — F02.80 EARLY ONSET ALZHEIMER'S DEMENTIA WITHOUT BEHAVIORAL DISTURBANCE (HCC): ICD-10-CM

## 2025-03-26 DIAGNOSIS — R26.2 AMBULATORY DYSFUNCTION: Primary | ICD-10-CM

## 2025-03-26 PROCEDURE — 97112 NEUROMUSCULAR REEDUCATION: CPT

## 2025-03-26 PROCEDURE — 96125 COGNITIVE TEST BY HC PRO: CPT

## 2025-03-26 NOTE — PROGRESS NOTES
Skilled Maintenance Note     Today's date: 3/26/2025  Patient name: Jadon Ann  : 1955  MRN: [id number]  Referring provider: Abran Elizalde MD  Dx:   Encounter Diagnosis     ICD-10-CM    1. Ambulatory dysfunction  R26.2                 Subjective: Patient's wife reports he has good and bad days. OK today - has to call to schedule home health apt      Objective: See treatment diary below Wife present throughout session.    Ankle weights donned 4# ea B/L   NuStep Lvl 2 x15 min, seat 11, UE 10  Fast walking w/ PT pacing x3 laps around clinic   Amb holding 10# TT 2 laps around clinic  STS 2x10 repetitions   Stairs (in clinic) x7 laps w/ handrail  Side stepping w/ PT HHA x2 laps 25'  Fwd/bwd amb 25ft x 4 laps   Floor pickup bean bags with DT cog naming color     Assessment:   Patient participated in skilled maintenance PT session focused on maintaining current level of mobility. Max and consistent cueing required. Wife or son present throughout.  VC for dual task and for direction consistently. Patient would continue to benefit from skilled maintenance  PT interventions to maintain current level of function and mobility. Patient demonstrated fatigue post treatment. Re evaluation NV.       Plan: Continue per plan of care.        POC expires Unit limit Auth Expiration date PT/OT + Visit Limit? Co-Insurance   12/23/24       3/5/25       5/26/25                     Visit/Unit Tracking  AUTH Status:  Date 25 2/3 2/19 2/24  3/6  3/12  3/26           Used                             Remaining

## 2025-04-02 ENCOUNTER — APPOINTMENT (OUTPATIENT)
Age: 70
End: 2025-04-02
Payer: COMMERCIAL

## 2025-04-02 NOTE — PROGRESS NOTES
Skilled Maintenance Note     Today's date: 2025  Patient name: Jadon Ann  : 1955  MRN: [id number]  Referring provider: Abran Elizalde MD  Dx:   No diagnosis found.            Subjective: Patient's wife reports he has good and bad days. OK today - has to call to schedule home health apt      Objective: See treatment diary below Wife present throughout session.    Ankle weights donned 4# ea B/L   NuStep Lvl 2 x15 min, seat 11, UE 10  Fast walking w/ PT pacing x3 laps around clinic   Amb holding 10# TT 2 laps around clinic  STS 2x10 repetitions   Stairs (in clinic) x7 laps w/ handrail  Side stepping w/ PT HHA x2 laps 25'  Fwd/bwd amb 25ft x 4 laps   Floor pickup bean bags with DT cog naming color     Assessment:   Patient participated in skilled maintenance PT session focused on maintaining current level of mobility. Max and consistent cueing required. Wife or son present throughout.  VC for dual task and for direction consistently. Patient would continue to benefit from skilled maintenance  PT interventions to maintain current level of function and mobility. Patient demonstrated fatigue post treatment. Re evaluation NV.       Plan: Continue per plan of care.        POC expires Unit limit Auth Expiration date PT/OT + Visit Limit? Co-Insurance   12/23/24       3/5/25       5/26/25                     Visit/Unit Tracking  AUTH Status:  Date 25 2/3 2/19 2/24  3/6  3/12  3/26           Used                             Remaining

## 2025-04-07 ENCOUNTER — APPOINTMENT (OUTPATIENT)
Age: 70
End: 2025-04-07
Payer: COMMERCIAL

## 2025-04-14 ENCOUNTER — OFFICE VISIT (OUTPATIENT)
Age: 70
End: 2025-04-14
Payer: COMMERCIAL

## 2025-04-14 DIAGNOSIS — G30.0 EARLY ONSET ALZHEIMER'S DEMENTIA WITHOUT BEHAVIORAL DISTURBANCE (HCC): ICD-10-CM

## 2025-04-14 DIAGNOSIS — R26.2 AMBULATORY DYSFUNCTION: Primary | ICD-10-CM

## 2025-04-14 DIAGNOSIS — R41.841 COGNITIVE COMMUNICATION DEFICIT: Primary | ICD-10-CM

## 2025-04-14 DIAGNOSIS — F02.80 EARLY ONSET ALZHEIMER'S DEMENTIA WITHOUT BEHAVIORAL DISTURBANCE (HCC): ICD-10-CM

## 2025-04-14 DIAGNOSIS — R48.8 OTHER SYMBOLIC DYSFUNCTIONS: ICD-10-CM

## 2025-04-14 PROCEDURE — 97112 NEUROMUSCULAR REEDUCATION: CPT

## 2025-04-14 PROCEDURE — 92507 TX SP LANG VOICE COMM INDIV: CPT
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
## 2025-04-18 ENCOUNTER — TELEPHONE (OUTPATIENT)
Age: 70
End: 2025-04-18

## 2025-04-18 NOTE — TELEPHONE ENCOUNTER
Inbound call received from Patient wife. [place] wife said the [facility] Physician called and said there is an abnormality found on the EEG completed at [facility] on 04/17/2025, [facility] Physician requested they get in touch with Dr. Elizalde for advisement.     [place] wife wanted to bring Patient to see Dr. Elizalde today and was made aware no appointments are available. Patient wife wants advisement asa and states it is very urgent. [place] wife made aware if Patient needs to be seen today we recommend ED.     Patient wife confirmed the best call back number is [phone] and we may leave a detailed voicemail if needed.     Dr. Elizalde please review EEG from 04/17/2025 from [facility] and advise. Patient wife wants to know if he should schedule a sooner appointment. Thank you!

## 2025-04-21 ENCOUNTER — EVALUATION (OUTPATIENT)
Age: 70
End: 2025-04-21
Attending: PSYCHIATRY & NEUROLOGY
Payer: COMMERCIAL

## 2025-04-21 ENCOUNTER — OFFICE VISIT (OUTPATIENT)
Age: 70
End: 2025-04-21
Payer: COMMERCIAL

## 2025-04-21 DIAGNOSIS — R41.841 COGNITIVE COMMUNICATION DEFICIT: Primary | ICD-10-CM

## 2025-04-21 DIAGNOSIS — G30.0 EARLY ONSET ALZHEIMER'S DEMENTIA WITHOUT BEHAVIORAL DISTURBANCE (HCC): ICD-10-CM

## 2025-04-21 DIAGNOSIS — R26.2 AMBULATORY DYSFUNCTION: Primary | ICD-10-CM

## 2025-04-21 DIAGNOSIS — R48.8 OTHER SYMBOLIC DYSFUNCTIONS: ICD-10-CM

## 2025-04-21 DIAGNOSIS — F02.80 EARLY ONSET ALZHEIMER'S DEMENTIA WITHOUT BEHAVIORAL DISTURBANCE (HCC): ICD-10-CM

## 2025-04-21 PROCEDURE — 97530 THERAPEUTIC ACTIVITIES: CPT

## 2025-04-21 PROCEDURE — 92507 TX SP LANG VOICE COMM INDIV: CPT

## 2025-04-21 PROCEDURE — 97112 NEUROMUSCULAR REEDUCATION: CPT

## 2025-04-21 NOTE — PROGRESS NOTES
PT Re-Evaluation / Maintenance          POC expires Unit limit Auth Expiration date PT/OT + Visit Limit? Co-Insurance   12/23/24       3/5/25       5/26/25                     Visit/Unit Tracking  AUTH Status:  Date 1/6/25 1/13 1/23 2/3 2/19 2/24  2/26               Used                             Remaining                                      Today's date: 2025  Patient name: Jadon Ann  : 1955  MRN: [id number]  Referring provider: Abran Elizalde MD  Dx:   Encounter Diagnosis     ICD-10-CM    1. Ambulatory dysfunction  R26.2             Assessment  Assessment details: Patient is a 70 y.o. Male who presents to skilled outpatient PT with ambulatory dysfunction. Significant PMHx of dementia. Wife present throughout session. Since last progress note, patient demonstrates maintenance and mini fluctuations in presentation. Required hand over hand placement for transitions, and HHA for continuation of gait. Would highly recommend home health due to familiarity of environment and due to lack of transportation (patient and wife cannot drive).  Continues to be limited by cueing, requiring hand over hand and one step, however able to complete without cueing with increased repetition.  Despite improvements, pt continues to present with decreased activity tolerance, poor sequencing with activities- improved with one step commands, occasional hand over hand cueing required. He is still at risk for falls, and is below age related norms. He is making progress towards goals and new goals established. Continue maintenance program focusing on maintaining gains due to hx of progressive disorder. Significant amount of time spent completing patient and family education on benefits to aerobic exercise, HEP, consistency of therapy, and prognosis. Regression is likely, due to history or regression noted, sedentary lifestyle, presence of  neurodegenerative/neurocognitive disorder, and limited ability for family member/caregiver to assist with HEP. Both the patient and caregiver would be at risk for increased injury if patient had LOB or fall due to inadequate safety equipment, which could lead to hospitalization and increased healthcare costs. At this time, due to increased distractions and unfamiliar environment and patient behavior including anxiety over new places, would recommend eventual transition to home health services. Pt and wife are aware and agreeable. Will keep patient on until transition occurs to reduce gap in care. Goal to transition to home health.     Bebeto Huston (2013): a [place] District Court decision that sought to clarify that Medicare will in fact cover skilled therapy services to maintain a patient’s current condition or prevent/slow further deterioration.    Impairments: Abnormal coordination, Abnormal gait, Abnormal muscle tone, Abnormal or restricted ROM, Activity intolerance, Impaired balance, Impaired physical strength, Lacks appropriate HEP, Poor posture, Poor body mechanics, Pain with function, Safety issue, Weight-bearing intolerance, Abnormal movement, Difficulty understanding, Abnormal muscle firing  Understanding of Dx/Px/POC: Good  Prognosis: Good    Patient verbalized understanding of POC.       Please contact me if you have any questions or recommendations. Thank you for the referral and the opportunity to share in Jadon Ann's care.    Plan  Plan details: Will 1-2x/week  Patient would benefit from: PT Eval, Skilled PT, OT Eval, Skilled OT, Speech Eval, and Skilled Speech Therapy  Planned modality interventions: Biofeedback, Cryotherapy, TENS, Thermotherapy  Planned therapy interventions: Abdominal trunk stabilization, ADL training, Balance, Balance/WB training, Breathing training, Body mechanics training, Coordination, Functional ROM exercises, Gait training, HEP, Joint Mobilization, Manual  Therapy, Barby taping, Motor coordination training, Neuromuscular re-education, Patient education, Postural training, Strengthening, Stretching, Therapeutic activities, Therapeutic exercises, Therapeutic training, Transfer training, Activity modification, Work reintegration  Frequency:1- 2x/wk  Duration in weeks: 12  Plan of Care beginning date: 2/26/25  Plan of Care expiration date: 3 months - 5/26/25  Treatment plan discussed with: Patient and Family    Goals  Short Term Goals (4 weeks):    - Patient will improve time on TUG by 2.9 seconds to facilitate improved safety in all ambulation  - Patient will be independent in basic HEP 2-3 weeks  - Patient will improve 5xSTS score by 2.3 seconds to promote improved LE functional strength needed for ADLs    Long Term Goals (12 weeks):  - Patient will be independent in a comprehensive home exercise program  - Patient will improve gait speed by 0.18 m/s to improve safety with community ambulation  - Patient will improve LERMA by 6 points in order to improve static balance and reduce risk for falls  - Patient will be able to demonstrate HT in gait without veering  - Patient will improve 6 Minute Walk Test score by 190 feet to promote improved cardiovascular endurance  - Patient will report 50% reduction in near falls in order to improve safety with functional tasks and reduce his risk for falls  - Patient will report going on walks at least 3 days per week to promote independence and improved cardiovascular endurance  - Patient will be able to ascend/descend stairs reciprocally with 1 UE assist to promote independence and safety with ADLs  - Patient will report 50% reduction in near falls when ambulating on uneven terrain      UPDATED GOALS:  - Patient will ambulate outdoors on uneven surfaces with limited assistance to facilitate safe community ambulation  - Patient will maintain gait speed per 10 meter walk test at 0.6 m/s to facilitate continued safety with community  ambulation  - Patient will continue to score at least 46/56 on LERMA to maintain fall risk status to reduce risk of injury  - Patient will be able to ambulate at least 1,000 ft during 6 Minute Walk Test to maintain current cardiovascular capacity  - Patient will attend PT 1x/week with focus on balance and gait training to maintain functional capacity  - Patient will maintain TUG      Cut off score  All date taken from [employer] Neuro Section or Rehab Measures    Lerma/56  MDC: 6 pts  Age Norms:  60-69: M - 55   F - 55  70-79: M - 54   F - 53  80-89: M - 53   F - 50 5xSTS: Amina et al 2010  MDC: 2.3 sec  Age Norms:  60-69: 11.4 sec  70-79: 12.6 sec  80-89: 14.8 sec   TUG  MDC: 4.14 sec  Cut off score:  >13.5 sec community dwelling adults  >32.2 frail elderly  <20 I for basic transfers  >30 dependent on transfers 10 Meter Walk Test: Ahmet et al 2011  MDC: 0.18 m/s  20-29: M - 1.35 m   F - 1.34 m  30-39: M - 1.43 m   F - 1.34 m  40-49: M - 1.43 m   F - 1.39 m  50-59: M - 1.43 m   F - 1.31 m  60-69: M - 1.34 m   F - 1.24 m  70-79: M - 1.26 m   F - 1.13 m  80-89: M - 0.97 m   F - 0.94 m    Household Ambulator < 0.4 m/s  Limited Community Ambulator 0.4 - 0.8 m/s  Community Ambulator 0.8 - 1.2 m/s  Safely cross the street > 1.2 m/s   FGA  MCID: 4 pts  Geriatrics/community < 22/30 fall risk  Geriatrics/community < 20/30 unexplained falls    DGI  MDC: vestibular - 4 pts  MDC: geriatric/community - 3 pts  Falls risk <19/24 mCTSIB  Norm: 20-60 yrs  Eyes open firm: norm sway 0.21-0.48  Eyes closed firm: norm sway 0.48-0.99  Eyes open foam: norm sway 0.38-0.71  Eyes closed foam: norm sway 0.70-2.22   6 Minute Walk Test  MDC: 190.98 ft  MCID: 164 ft    Age Norms  60-69: M - 1876 ft (571.80 m)  F - 1765 ft (537.98 m)  70-79: M - 1729 ft (527.00 m)  F - 1545 ft (470.92 m)  80-89: M - 1368 ft (416.97 m)  F - 1286 ft (391.97 m) ABC: Candi & Membreno, 2003  <67% increased risk for falls   Lyons-Tammie  Monofilaments  Evaluator Size:        Force (grams):          Hand/Dorsal Thresholds:        Plantar Thresholds:  - 1.65                       - 0.008                       - Normal                                 - Normal  - 2.36                       - 0.02                         - Normal                                 - Normal  - 2.44                       - 0.04                         - Normal                                 - Normal  - 2.83                       - 0.07                         - Normal                                 - Normal  - 3.22                       - 0.16                         - Diminished light touch          - Normal  - 3.61                       - 0.40                         - Diminished light touch          - Normal  - 3.84                       - 0.60                         - Diminished protective           - Diminished light touch  - 4.08                       - 1.00                         - Diminished protective           - Diminished light touch  - 4.17                       - 1.40                         - Diminished protective           - Diminished light touch  - 4.31                       - 2.00                         - Diminished protective           - Diminished light touch  - 4.56                       - 4.00                         - Loss of protective sense      - Diminished protective  - 4.74                       - 6.00                         - Loss of protective sense      - Diminished protective  - 4.93                       - 8.00                         - Loss of protective sense      - Diminished protective  - 5.07                       - 10.0                         - Loss of protective sense     - Loss of protective sense  - 5.18                       - 15.0                         - Loss of protective sense     - Loss of protective sense  - 5.46                       - 26.0                         - Loss of protective sense     - Loss of protective  sense  - 5.88                       - 60.0                         - Loss of protective sense     - Loss of protective sense  - 6.10                       - 100                          - Loss of protective sense     - Loss of protective sense  - 6.45                       - 180                          - Loss of protective sense     - Loss of protective sense  - 6.65                       - 300                          - Deep pressure sense only  - Deep pressure sense only         Subjective    History of Present Illness  - Mechanism of injury: has bike at home- interested in going to the gym but is concerned for safety. Has sig PMHx of dementia. Wife present and assisted with history. He requires assist for ADLs and iADLs. Difficulty with sequencing and has FOF.    UPDATE 24: Pt reports they missed last week due to personal issues. They are staying busy at home.     UPDATE 24: Patient and wife arrive to PT. Nothing new to report. Son drove them to PT.     UPDATE 25: Wife reports patient is anxious about coming to [facility] at times, is worried about being left. He is doing his exercises at home.     UPDATE 25: Pt wife present throughout session    - Primary AD: none    Patient goal: improve confidence with walking    Pain  - Current pain ratin/10    Social Support  - Steps to enter house: 4 JAMARI   - Stairs in house: 2 story- has first floor set up has FF with HR   - Lives with: wife  - Hand dominance: R    Objective  LE MMT  - R Hip Flexion: 4/5   L Hip Flexion: 4/5  - R Knee Extension: 4/5  L Knee Extension: 4/5  - R Knee Flexion: 4-/5   L Knee Flexion: 4-/5  - R Ankle DF: 4/5   L Ankle DF: 4/5  - R Ankle PF: 4/5   L Ankle PF: 4/5    Sensation  - Light touch: wfl    - Temperature: subjective yes    Coordination  - Heel to Shin: unable to complete  - Alternate Toe Taps: unable to complete    Postural Screen  - Observation: moderate forward head and rounded shoulders      Gait  - Abnormalities:  dec step length B/L, with Lateral deviation towrads the L       Outcome Measures Initial Eval  9/23 11/4 12/5 1/6 2/26 4/21   5xSTS 31.61 sec no UE 23.91s no UE 20.73s no UE 19.49s   1 episode of freezing/ anxiety about where chair is 26.61s BUE    29.96s UE on PT 33.01s BUE    39.86s UE on PT         TUG  - Regular  - Cognitive   25.53 sec   22.83s    21.5s   17.75s   22.91s 28s (difficulty sitting in chair)   10 meter 0.62 m/s 0.6m/s 0.61m/s 0.7m/s  0.68m/s 0.76m/s with HHA VC for inc speed   LERMA 31/56 42/56 46/56 46/56 47/56 43/56 cueing max   mCTSIB  - FTEO (firm)  - FTEC (firm)  - FTEO (foam)  - FTEC (foam)   30 sec  30 sec  NT   30  30       NT NT NT NT   6MWT 800 ft GB 900ft GB 1,050ft 1 HHA 1,100ft no HHA just distant supervision 1,100ft no HHA just distanct supervision 800ft HHA   ABC 35.63%                   NuStep 8'  STS with Y MB 10x  Amb holding 6# MB 200ft x3        Precautions: dementia  No past medical history on file.

## 2025-04-21 NOTE — PROGRESS NOTES
Speech-Language Pathology Re-Evaluation    Today's date: 2025   Patient’s name: Jadon Ann  : 1955  MRN: [id number]  Safety measures: Alzheimer's   Referring provider: Abran Elizalde MD    Encounter Diagnosis     ICD-10-CM    1. Cognitive communication deficit  R41.841       2. Other symbolic dysfunctions  R48.8       3. Early onset Alzheimer's dementia without behavioral disturbance (HCC)  G30.0     F02.80               Assessment:   Patient continues to present with severe cognitive-linguistic impairment c/b deficits with orientation (personal information, time, place), attention, memory, organization of thoughts, planning, sequencing, problem solving, following basic and multi-step directions, verbal fluency, and limited participation in conversation. Patient continues to require consistent encouragement from clinician and spouse in order to engage socially or he tends to shut down d/t communication difficulties.  It is suspected that the patient would benefit from continuation of outpatient skilled Speech Therapy maintenance services to target goals to maintain his current level of cognitive-linguistic functioning, as w/o skilled instruction, patient's skills are highly likely to regress. Patient and wife are in agreement with this plan. Solely, an HEP is not appropriate at this time as he continues to require the guidance and clinical decision making from a skilled clinician. Without the skills of a clinician providing these necessary services, patient’s status may decline (e.g., decreased functional recall, reduced attempts with verbal expression, decreased comprehension, socially withdrawn, reduced quality of life, decreased safety, increased frustration, caregiver burden).       Short Term Goals  Patient will participate in continued therapeutic trials with 80% accuracy for continued assessment and recommendations for carryover and functional tasks at home, to be achieved in 4-6 weeks.  "ONGOING  Patient will improve long term memory retrieval and recall of information during reminiscing tasks for creation of memory book with 80% accuracy, to be achieved in 4-6 weeks. ONGOING  Patient and family will participate in creation and use of memory book with carry over in 90% of opportunities to facilitate improved overall function and quality of life, to be achieved in 4-6 weeks. ONGOING     Long Term Goals  1. Patient will improve functional cognitive-linguistic skills with the implementation of cues and external aids, by discharge. ONGOING  2. Patient will demonstrate adequate memory/reasoning/judgment to perform desired activities in a supervised environment by discharge. ONGOING  3. Patient's family/caregiver will demonstrate implementation of memory book/aides into daily activities to assist with ADLS and improve quality of life. ONGOING       Plan:  Patient would benefit from outpatient skilled Speech Therapy services: Maintenance therapy program    Frequency: 1x weekly  Duration: 2 months    Intervention certification from: 4/21/2025  Intervention certification to: 6/5/2025      Subjective:  -Patient pleasant and cooperative. Patient's wife present for today's session. No new concerns reported.     Patient's goal(s): \"To preserve the skills he has now for as long as we can.\"    Pain: Absent (scale:  N/A )      Objective (testing):  No formal testing administered as today's session serves as a progress update.         Treatment:  Patient will improve long term memory retrieval and recall of information during reminiscing tasks for creation of memory book with 80% accuracy, to be achieved in 4-6 weeks.       Visit Tracking:   POC expires Unit limit Auth Expiration date PT/OT + Visit Limit?   10/8/24 N/A  NO COG CODES 4/18/25 BOMN   1/8/25 N/A  NO COG CODES  12/31/24 BOMN    3/5/25 N/A  NO COG CODES   12/31/25 BOMN    6/5/25 N/A  NO COG CODES   12/31/25 BOMN             Visit/Unit Tracking  AUTH " Status:  Date  3/26  RE 4/14 4/21  PU                 No Used  2 1  2                   Remaining   8 9  8                       Intervention comments:  - 45 minutes language tx

## 2025-04-24 ENCOUNTER — APPOINTMENT (OUTPATIENT)
Dept: LAB | Facility: HOSPITAL | Age: 70
End: 2025-04-24
Attending: INTERNAL MEDICINE
Payer: COMMERCIAL

## 2025-04-24 DIAGNOSIS — F02.83 DEMENTIA OF ALZHEIMER'S TYPE, WITH EARLY ONSET, WITH DEPRESSED MOOD (HCC): ICD-10-CM

## 2025-04-24 DIAGNOSIS — Z13.1 SCREENING FOR DIABETES MELLITUS: ICD-10-CM

## 2025-04-24 DIAGNOSIS — Z13.220 SCREENING FOR LIPOID DISORDERS: ICD-10-CM

## 2025-04-24 DIAGNOSIS — D64.9 ANEMIA, UNSPECIFIED TYPE: ICD-10-CM

## 2025-04-24 DIAGNOSIS — G30.0 DEMENTIA OF ALZHEIMER'S TYPE, WITH EARLY ONSET, WITH DEPRESSED MOOD (HCC): ICD-10-CM

## 2025-04-24 LAB
ALBUMIN SERPL BCG-MCNC: 4.2 G/DL (ref 3.5–5)
ALP SERPL-CCNC: 221 U/L (ref 34–104)
ALT SERPL W P-5'-P-CCNC: 14 U/L (ref 7–52)
ANION GAP SERPL CALCULATED.3IONS-SCNC: 4 MMOL/L (ref 4–13)
AST SERPL W P-5'-P-CCNC: 13 U/L (ref 13–39)
BASOPHILS # BLD AUTO: 0.07 THOUSANDS/ÂΜL (ref 0–0.1)
BASOPHILS NFR BLD AUTO: 1 % (ref 0–1)
BILIRUB SERPL-MCNC: 0.57 MG/DL (ref 0.2–1)
BUN SERPL-MCNC: 16 MG/DL (ref 5–25)
CALCIUM SERPL-MCNC: 9.5 MG/DL (ref 8.4–10.2)
CHLORIDE SERPL-SCNC: 103 MMOL/L (ref 96–108)
CHOLEST SERPL-MCNC: 163 MG/DL (ref ?–200)
CO2 SERPL-SCNC: 33 MMOL/L (ref 21–32)
CREAT SERPL-MCNC: 0.79 MG/DL (ref 0.6–1.3)
EOSINOPHIL # BLD AUTO: 0.82 THOUSAND/ÂΜL (ref 0–0.61)
EOSINOPHIL NFR BLD AUTO: 9 % (ref 0–6)
ERYTHROCYTE [DISTWIDTH] IN BLOOD BY AUTOMATED COUNT: 13.8 % (ref 11.6–15.1)
EST. AVERAGE GLUCOSE BLD GHB EST-MCNC: 123 MG/DL
GFR SERPL CREATININE-BSD FRML MDRD: 90 ML/MIN/1.73SQ M
GLUCOSE P FAST SERPL-MCNC: 101 MG/DL (ref 65–99)
HBA1C MFR BLD: 5.9 %
HCT VFR BLD AUTO: 47.5 % (ref 36.5–49.3)
HDLC SERPL-MCNC: 44 MG/DL
HGB BLD-MCNC: 15.4 G/DL (ref 12–17)
IMM GRANULOCYTES # BLD AUTO: 0.02 THOUSAND/UL (ref 0–0.2)
IMM GRANULOCYTES NFR BLD AUTO: 0 % (ref 0–2)
LDLC SERPL CALC-MCNC: 102 MG/DL (ref 0–100)
LYMPHOCYTES # BLD AUTO: 1.71 THOUSANDS/ÂΜL (ref 0.6–4.47)
LYMPHOCYTES NFR BLD AUTO: 20 % (ref 14–44)
MCH RBC QN AUTO: 29.4 PG (ref 26.8–34.3)
MCHC RBC AUTO-ENTMCNC: 32.4 G/DL (ref 31.4–37.4)
MCV RBC AUTO: 91 FL (ref 82–98)
MONOCYTES # BLD AUTO: 0.57 THOUSAND/ÂΜL (ref 0.17–1.22)
MONOCYTES NFR BLD AUTO: 7 % (ref 4–12)
NEUTROPHILS # BLD AUTO: 5.49 THOUSANDS/ÂΜL (ref 1.85–7.62)
NEUTS SEG NFR BLD AUTO: 63 % (ref 43–75)
NONHDLC SERPL-MCNC: 119 MG/DL
NRBC BLD AUTO-RTO: 0 /100 WBCS
PLATELET # BLD AUTO: 355 THOUSANDS/UL (ref 149–390)
PMV BLD AUTO: 9.8 FL (ref 8.9–12.7)
POTASSIUM SERPL-SCNC: 4.2 MMOL/L (ref 3.5–5.3)
PROT SERPL-MCNC: 7.1 G/DL (ref 6.4–8.4)
RBC # BLD AUTO: 5.23 MILLION/UL (ref 3.88–5.62)
SODIUM SERPL-SCNC: 140 MMOL/L (ref 135–147)
TRIGL SERPL-MCNC: 83 MG/DL (ref ?–150)
WBC # BLD AUTO: 8.68 THOUSAND/UL (ref 4.31–10.16)

## 2025-04-24 PROCEDURE — 83036 HEMOGLOBIN GLYCOSYLATED A1C: CPT

## 2025-04-24 PROCEDURE — 36415 COLL VENOUS BLD VENIPUNCTURE: CPT

## 2025-04-24 PROCEDURE — 80053 COMPREHEN METABOLIC PANEL: CPT

## 2025-04-24 PROCEDURE — 80061 LIPID PANEL: CPT

## 2025-04-24 PROCEDURE — 85025 COMPLETE CBC W/AUTO DIFF WBC: CPT

## 2025-04-28 ENCOUNTER — OFFICE VISIT (OUTPATIENT)
Age: 70
End: 2025-04-28
Payer: COMMERCIAL

## 2025-04-28 DIAGNOSIS — G30.0 EARLY ONSET ALZHEIMER'S DEMENTIA WITHOUT BEHAVIORAL DISTURBANCE (HCC): ICD-10-CM

## 2025-04-28 DIAGNOSIS — R41.841 COGNITIVE COMMUNICATION DEFICIT: Primary | ICD-10-CM

## 2025-04-28 DIAGNOSIS — R26.2 AMBULATORY DYSFUNCTION: Primary | ICD-10-CM

## 2025-04-28 DIAGNOSIS — F02.80 EARLY ONSET ALZHEIMER'S DEMENTIA WITHOUT BEHAVIORAL DISTURBANCE (HCC): ICD-10-CM

## 2025-04-28 DIAGNOSIS — R48.8 OTHER SYMBOLIC DYSFUNCTIONS: ICD-10-CM

## 2025-04-28 PROCEDURE — 92507 TX SP LANG VOICE COMM INDIV: CPT

## 2025-04-28 PROCEDURE — 97112 NEUROMUSCULAR REEDUCATION: CPT

## 2025-04-28 NOTE — PROGRESS NOTES
"Daily Speech Treatment Note    Today's date: 2025   Patient’s name: Jadon Ann  : 1955  MRN: [id number]  Safety measures: Alzheimer's   Referring provider: Abran Elizalde MD    Encounter Diagnosis     ICD-10-CM    1. Cognitive communication deficit  R41.841       2. Other symbolic dysfunctions  R48.8       3. Early onset Alzheimer's dementia without behavioral disturbance (HCC)  G30.0     F02.80           Visit Tracking:   POC expires Unit limit Auth Expiration date PT/OT + Visit Limit?   10/8/24 N/A  NO COG CODES 25 BOMN   25 N/A  NO COG CODES  24 BOMN    3/5/25 N/A  NO COG CODES   25 BOMN    25 N/A  NO COG CODES   25 BOMN             Visit/Unit Tracking  AUTH Status:  Date  3/26  RE   PU                 No Used  2 1  2  3                 Remaining   8 9  8  7                     Subjective/Behavioral:  - Patient pleasant and cooperative. Patient's wife present for today's session. Patient several minutes late for session; agreeable to shortened session. Patient's wife reported patient's blood work came back abnormal. Discussed blood work results and impact of elevated factors; f/u labs scheduled for later this week.     Objective/Assessment:  -Patient's family member/caregiver was present during today's session.  -Reviewed testing results and goals in plan care with patient. Patient is in agreement at this time. Discussed current PLOF with regular solids and thin liquids as patient was observed with wet vocal quality following sip of thin water. Patient unable to follow command and model to throat clear. Discussed safe swallow strategies and observed patient spontaneously commenting, \"I take my time\". Patient and wife reeducated on small bites/sips, slow rate of intake, reduce distractions, use liquid wash following every 2-3 bites. Patient denies any difficulty eating or drinking at this time.     Short-term goals:  Patient will participate in " continued therapeutic trials with 80% accuracy for continued assessment and recommendations for carryover and functional tasks at home, to be achieved in 4-6 weeks.   Patient will improve long term memory retrieval and recall of information during reminiscing tasks for creation of memory book with 80% accuracy, to be achieved in 4-6 weeks.   4/28/25: Discussed patients favorite foods and desserts. Patient requires mod-max prompting to engage in responses, suspected to be 2/2 language deficits; patient benefits from fixed choices and verbal shaping of responses. He provided 4 IND comments and required verbal prompting for an additional 7 comments throughout structured and semi-structured conversations throughout 45 minute session (4/7 = 57% accuracy).   Patient and family will participate in creation and use of memory book with carry over in 90% of opportunities to facilitate improved overall function and quality of life, to be achieved in 4-6 weeks.    Plan:  -Continue with current plan of care.

## 2025-04-29 NOTE — PROGRESS NOTES
Skilled Maintenance Note     Today's date: 2025  Patient name: Jadon Ann  : 1955  MRN: [id number]  Referring provider: Abran Elizalde MD  Dx:   Encounter Diagnosis     ICD-10-CM    1. Ambulatory dysfunction  R26.2                 Subjective: Nothing new to report      Objective: See treatment diary below Wife present throughout session.    Ankle weights donned 4# ea B/L   NuStep Lvl 2 x15 min, seat 11, UE 10  Fast walking w/ PT pacing x3 laps around clinic   Amb holding 10# TT 2 laps around clinic  STS 5x  Fwd/bwd amb 25ft x 4 laps   Floor pickup bean bags with DT cog naming color 5x    Assessment:  Patient participated in skilled maintenance PT session focused on maintaining current level of mobility. Max and consistent cueing required. Wife or son present throughout.  VC for dual task and for direction consistently. Improved technique and dec VC required for STS this session, improved with tactile cueing. Patient would continue to benefit from skilled maintenance  PT interventions to maintain current level of function and mobility. Patient demonstrated fatigue post treatment.       Plan: Continue per plan of care.        POC expires Unit limit Auth Expiration date PT/OT + Visit Limit? Co-Insurance   12/23/24       3/5/25       5/26/25                     Visit/Unit Tracking  AUTH Status:  Date 1/6/25 1/13 1/23 2/3 2/19 2/24  3/6  3/12  3/26           Used                             Remaining

## 2025-04-30 NOTE — PROGRESS NOTES
PT Re-Evaluation / Maintenance          POC expires Unit limit Auth Expiration date PT/OT + Visit Limit? Co-Insurance   12/23/24       3/5/25                            Visit/Unit Tracking  AUTH Status:  Date 1/6/25 1/13 1/23 2/3 2/19 2/24  2/26               Used                             Remaining                                      Today's date: 2025  Patient name: Jadon Ann  : 1955  MRN: [id number]  Referring provider: Abran Elizalde MD  Dx:   Encounter Diagnosis     ICD-10-CM    1. Ambulatory dysfunction  R26.2             Assessment  Assessment details: Patient is a 69 y.o. Male who presents to skilled outpatient PT with ambulatory dysfunction. Significant PMHx of dementia. Wife present throughout session. Since last progress note, patient demo significant improvement in all outcome measures. Continues to be limited by cueing, requiring hand over hand and one step, however able to complete without cueing with increased repetition. Trialed completing outcome measures 2x with significant improvement in second repetition. Despite improvements, pt continues to present with decreased activity tolerance, poor sequencing with activities- improved with one step commands, occasional hand over hand cueing required. He is still at risk for falls, and is below age related norms. He is making progress towards goals and new goals established. Continue maintenance program focusing on maintaining gains due to hx of progressive disorder. Significant amount of time spent completing patient and family education on benefits to aerobic exercise, HEP, consistency of therapy, and prognosis. Regression is likely, due to history or regression noted, sedentary lifestyle, presence of neurodegenerative/neurocognitive disorder, and limited ability for family member/caregiver to assist with HEP. Both the patient and caregiver would be at risk for  Patient ambulated without incident and denies any new or remaining weakness.   increased injury if patient had LOB or fall due to inadequate safety equipment, which could lead to hospitalization and increased healthcare costs. At this time, due to increased distractions and unfamiliar environment and patient behavior including anxiety over new places, would recommend eventual transition to home health services. Pt and wife are aware and agreeable. Will keep patient on until transition occurs to reduce gap in care.     Bebeto Huston (2013): a [place] District Court decision that sought to clarify that Medicare will in fact cover skilled therapy services to maintain a patient’s current condition or prevent/slow further deterioration.    Impairments: Abnormal coordination, Abnormal gait, Abnormal muscle tone, Abnormal or restricted ROM, Activity intolerance, Impaired balance, Impaired physical strength, Lacks appropriate HEP, Poor posture, Poor body mechanics, Pain with function, Safety issue, Weight-bearing intolerance, Abnormal movement, Difficulty understanding, Abnormal muscle firing  Understanding of Dx/Px/POC: Good  Prognosis: Good    Patient verbalized understanding of POC.       Please contact me if you have any questions or recommendations. Thank you for the referral and the opportunity to share in Jadon nAn's care.    Plan  Plan details: Will 1-2x/week  Patient would benefit from: PT Eval, Skilled PT, OT Eval, Skilled OT, Speech Eval, and Skilled Speech Therapy  Planned modality interventions: Biofeedback, Cryotherapy, TENS, Thermotherapy  Planned therapy interventions: Abdominal trunk stabilization, ADL training, Balance, Balance/WB training, Breathing training, Body mechanics training, Coordination, Functional ROM exercises, Gait training, HEP, Joint Mobilization, Manual Therapy, Dior taping, Motor coordination training, Neuromuscular re-education, Patient education, Postural training, Strengthening, Stretching, Therapeutic activities, Therapeutic exercises,  Therapeutic training, Transfer training, Activity modification, Work reintegration  Frequency:1- 2x/wk  Duration in weeks: 12  Plan of Care beginning date: 2/26/25  Plan of Care expiration date: 3 months - 5/26/25  Treatment plan discussed with: Patient and Family    Goals  Short Term Goals (4 weeks):    - Patient will improve time on TUG by 2.9 seconds to facilitate improved safety in all ambulation  - Patient will be independent in basic HEP 2-3 weeks  - Patient will improve 5xSTS score by 2.3 seconds to promote improved LE functional strength needed for ADLs    Long Term Goals (12 weeks):  - Patient will be independent in a comprehensive home exercise program  - Patient will improve gait speed by 0.18 m/s to improve safety with community ambulation  - Patient will improve LERMA by 6 points in order to improve static balance and reduce risk for falls  - Patient will be able to demonstrate HT in gait without veering  - Patient will improve 6 Minute Walk Test score by 190 feet to promote improved cardiovascular endurance  - Patient will report 50% reduction in near falls in order to improve safety with functional tasks and reduce his risk for falls  - Patient will report going on walks at least 3 days per week to promote independence and improved cardiovascular endurance  - Patient will be able to ascend/descend stairs reciprocally with 1 UE assist to promote independence and safety with ADLs  - Patient will report 50% reduction in near falls when ambulating on uneven terrain      UPDATED GOALS:  - Patient will ambulate outdoors on uneven surfaces with limited assistance to facilitate safe community ambulation  - Patient will maintain gait speed per 10 meter walk test at 0.6 m/s to facilitate continued safety with community ambulation  - Patient will continue to score at least 46/56 on LERMA to maintain fall risk status to reduce risk of injury  - Patient will be able to ambulate at least 1,000 ft during 6 Minute Walk  Test to maintain current cardiovascular capacity  - Patient will attend PT 1x/week with focus on balance and gait training to maintain functional capacity  - Patient will maintain TUG      Cut off score  All date taken from [employer] Neuro Section or Rehab Measures    Dejesus/56  MDC: 6 pts  Age Norms:  60-69: M - 55   F - 55  70-79: M - 54   F - 53  80-89: M - 53   F - 50 5xSTS: Amina et al 2010  MDC: 2.3 sec  Age Norms:  60-69: 11.4 sec  70-79: 12.6 sec  80-89: 14.8 sec   TUG  MDC: 4.14 sec  Cut off score:  >13.5 sec community dwelling adults  >32.2 frail elderly  <20 I for basic transfers  >30 dependent on transfers 10 Meter Walk Test: Levon and Dwayne et al 2011  MDC: 0.18 m/s  20-29: M - 1.35 m   F - 1.34 m  30-39: M - 1.43 m   F - 1.34 m  40-49: M - 1.43 m   F - 1.39 m  50-59: M - 1.43 m   F - 1.31 m  60-69: M - 1.34 m   F - 1.24 m  70-79: M - 1.26 m   F - 1.13 m  80-89: M - 0.97 m   F - 0.94 m    Household Ambulator < 0.4 m/s  Limited Community Ambulator 0.4 - 0.8 m/s  Community Ambulator 0.8 - 1.2 m/s  Safely cross the street > 1.2 m/s   FGA  MCID: 4 pts  Geriatrics/community < 22/30 fall risk  Geriatrics/community < 20/30 unexplained falls    DGI  MDC: vestibular - 4 pts  MDC: geriatric/community - 3 pts  Falls risk <19/24 mCTSIB  Norm: 20-60 yrs  Eyes open firm: norm sway 0.21-0.48  Eyes closed firm: norm sway 0.48-0.99  Eyes open foam: norm sway 0.38-0.71  Eyes closed foam: norm sway 0.70-2.22   6 Minute Walk Test  MDC: 190.98 ft  MCID: 164 ft    Age Norms  60-69: M - 1876 ft (571.80 m)  F - 1765 ft (537.98 m)  70-79: M - 1729 ft (527.00 m)  F - 1545 ft (470.92 m)  80-89: M - 1368 ft (416.97 m)  F - 1286 ft (391.97 m) ABC: Candi & Haseeb, 2003  <67% increased risk for falls   Anton-Tammie Monofilaments  Evaluator Size:        Force (grams):          Hand/Dorsal Thresholds:        Plantar Thresholds:  - 1.65                       - 0.008                       - Normal                                  - Normal  - 2.36                       - 0.02                         - Normal                                 - Normal  - 2.44                       - 0.04                         - Normal                                 - Normal  - 2.83                       - 0.07                         - Normal                                 - Normal  - 3.22                       - 0.16                         - Diminished light touch          - Normal  - 3.61                       - 0.40                         - Diminished light touch          - Normal  - 3.84                       - 0.60                         - Diminished protective           - Diminished light touch  - 4.08                       - 1.00                         - Diminished protective           - Diminished light touch  - 4.17                       - 1.40                         - Diminished protective           - Diminished light touch  - 4.31                       - 2.00                         - Diminished protective           - Diminished light touch  - 4.56                       - 4.00                         - Loss of protective sense      - Diminished protective  - 4.74                       - 6.00                         - Loss of protective sense      - Diminished protective  - 4.93                       - 8.00                         - Loss of protective sense      - Diminished protective  - 5.07                       - 10.0                         - Loss of protective sense     - Loss of protective sense  - 5.18                       - 15.0                         - Loss of protective sense     - Loss of protective sense  - 5.46                       - 26.0                         - Loss of protective sense     - Loss of protective sense  - 5.88                       - 60.0                         - Loss of protective sense     - Loss of protective sense  - 6.10                       - 100                          - Loss of protective sense      - Loss of protective sense  - 6.45                       - 180                          - Loss of protective sense     - Loss of protective sense  - 6.65                       - 300                          - Deep pressure sense only  - Deep pressure sense only         Subjective    History of Present Illness  - Mechanism of injury: has bike at home- interested in going to the gym but is concerned for safety. Has sig PMHx of dementia. Wife present and assisted with history. He requires assist for ADLs and iADLs. Difficulty with sequencing and has FOF.    UPDATE 24: Pt reports they missed last week due to personal issues. They are staying busy at home.     UPDATE 24: Patient and wife arrive to PT. Nothing new to report. Son drove them to PT.     UPDATE 25: Wife reports patient is anxious about coming to [facility] at times, is worried about being left. He is doing his exercises at home.     - Primary AD: none    Patient goal: improve confidence with walking    Pain  - Current pain ratin/10    Social Support  - Steps to enter house: 4 JAMARI   - Stairs in house: 2 story- has first floor set up has FF with HR   - Lives with: wife  - Hand dominance: R    Objective  LE MMT  - R Hip Flexion: 4/5   L Hip Flexion: 4/5  - R Knee Extension: 4/5  L Knee Extension: 4/5  - R Knee Flexion: 4-/5   L Knee Flexion: 4-/5  - R Ankle DF: 4/5   L Ankle DF: 4/5  - R Ankle PF: 4/5   L Ankle PF: 4/5    Sensation  - Light touch: wfl    - Temperature: subjective yes    Coordination  - Heel to Shin: unable to complete  - Alternate Toe Taps: unable to complete    Postural Screen  - Observation: moderate forward head and rounded shoulders      Gait  - Abnormalities: dec step length B/L, with Lateral deviation towrads the L       Outcome Measures Initial Eval      5xSTS 31.61 sec no UE 23.91s no UE 20.73s no UE 19.49s   1 episode of freezing/ anxiety about where chair is 26.61s BUE    29.96s UE on PT    TUG  -  Regular  - Cognitive   25.53 sec   22.83s    21.5s   17.75s   22.91s    10 meter 0.62 m/s 0.6m/s 0.61m/s 0.7m/s  0.68m/s    FLORENTIN 31/56 42/56 46/56 46/56 47/56    mCTSIB  - FTEO (firm)  - FTEC (firm)  - FTEO (foam)  - FTEC (foam)   30 sec  30 sec  NT   30  30       NT NT NT    6MWT 800 ft GB 900ft GB 1,050ft 1 HHA 1,100ft no HHA just distant supervision 1,100ft no HHA just distanct supervision    ABC 35.63%                   NuStep 8'  STS with Y MB 10x  Amb holding 6# MB 200ft x3        Precautions: dementia  No past medical history on file.

## 2025-05-05 ENCOUNTER — OFFICE VISIT (OUTPATIENT)
Age: 70
End: 2025-05-05
Attending: PSYCHIATRY & NEUROLOGY
Payer: COMMERCIAL

## 2025-05-05 DIAGNOSIS — R26.2 AMBULATORY DYSFUNCTION: Primary | ICD-10-CM

## 2025-05-05 PROCEDURE — 97112 NEUROMUSCULAR REEDUCATION: CPT

## 2025-05-05 NOTE — PROGRESS NOTES
Skilled Maintenance Note     Today's date: 2025  Patient name: Jadon Ann  : 1955  MRN: [id number]  Referring provider: Abran Elizalde MD  Dx:   Encounter Diagnosis     ICD-10-CM    1. Ambulatory dysfunction  R26.2                 Subjective: Nothing new to report      Objective: See treatment diary below Wife present throughout session.    Ankle weights donned 4# ea B/L   NuStep Lvl 2 x15 min, seat 11, UE 10  Fast walking w/ PT pacing x3 laps around clinic   Amb holding 10# TT 2 laps around clinic  STS 20x  Fwd/bwd amb 25ft x 4 laps   Floor pickup bean bags with DT cog naming color 5x    Assessment:  Patient participated in skilled maintenance PT session focused on maintaining current level of mobility. Max and consistent cueing required. Wife or son present throughout. VC for dual task and for direction consistently.  Highly distracted. Patient would continue to benefit from skilled maintenance  PT interventions to maintain current level of function and mobility. Patient demonstrated fatigue post treatment.       Plan: Continue per plan of care.        POC expires Unit limit Auth Expiration date PT/OT + Visit Limit? Co-Insurance   12/23/24       3/5/25       5/26/25                     Visit/Unit Tracking  AUTH Status:  Date 1/6/25 1/13 1/23 2/3 2/19 2/24  3/6  3/12  3/26  5/5         Used                             Remaining

## 2025-05-07 ENCOUNTER — OFFICE VISIT (OUTPATIENT)
Age: 70
End: 2025-05-07
Payer: COMMERCIAL

## 2025-05-07 ENCOUNTER — OFFICE VISIT (OUTPATIENT)
Age: 70
End: 2025-05-07
Attending: PSYCHIATRY & NEUROLOGY
Payer: COMMERCIAL

## 2025-05-07 DIAGNOSIS — G30.0 EARLY ONSET ALZHEIMER'S DEMENTIA WITHOUT BEHAVIORAL DISTURBANCE (HCC): ICD-10-CM

## 2025-05-07 DIAGNOSIS — R26.2 AMBULATORY DYSFUNCTION: Primary | ICD-10-CM

## 2025-05-07 DIAGNOSIS — R41.841 COGNITIVE COMMUNICATION DEFICIT: Primary | ICD-10-CM

## 2025-05-07 DIAGNOSIS — R48.8 OTHER SYMBOLIC DYSFUNCTIONS: ICD-10-CM

## 2025-05-07 DIAGNOSIS — F02.80 EARLY ONSET ALZHEIMER'S DEMENTIA WITHOUT BEHAVIORAL DISTURBANCE (HCC): ICD-10-CM

## 2025-05-07 PROCEDURE — 97112 NEUROMUSCULAR REEDUCATION: CPT

## 2025-05-07 PROCEDURE — 92507 TX SP LANG VOICE COMM INDIV: CPT

## 2025-05-07 NOTE — PROGRESS NOTES
Skilled Maintenance Note     Today's date: 2025  Patient name: Jadon Ann  : 1955  MRN: [id number]  Referring provider: Abran Elizalde MD  Dx:   Encounter Diagnosis     ICD-10-CM    1. Ambulatory dysfunction  R26.2                   Subjective: Pt reports he is feeling tired      Objective: See treatment diary below Wife present throughout session.    Ankle weights donned 4# ea B/L   NuStep Lvl 2 x15 min, seat 11, UE 10  Fast walking w/ PT pacing x3 laps around clinic   Amb holding 10# TT 2 laps around clinic  STS 20x  Fwd/bwd amb 25ft x 4 laps   Floor pickup bean bags with DT cog naming color 5x    Assessment:  Patient participated in skilled maintenance PT session focused on maintaining current level of mobility. Patient highly distracted today and agitated. Appeared overstimulated with simple cueing and with transfers to/from chair. Completed stairs with hand over hand placement and max VC. Pt at that point became agitated again and requested to go home. Ended session early. Patient would continue to benefit from skilled maintenance  PT interventions to maintain current level of function and mobility with appropriate for [place] home health due to lack of transportation in the near future. Patient demonstrated fatigue post treatment.       Plan: Continue per plan of care.        POC expires Unit limit Auth Expiration date PT/OT + Visit Limit? Co-Insurance   12/23/24       3/5/25       5/26/25                     Visit/Unit Tracking  AUTH Status:  Date 25 2/3 2/19 2/24  3/6  3/12  3/26  5/5         Used                             Remaining

## 2025-05-07 NOTE — PROGRESS NOTES
"Daily Speech Treatment Note  Skilled Maintenance Program    Today's date: 2025   Patient’s name: Jadon Ann  : 1955  MRN: [id number]  Safety measures: Alzheimer's   Referring provider: Abran Elizalde MD    Encounter Diagnosis     ICD-10-CM    1. Cognitive communication deficit  R41.841       2. Other symbolic dysfunctions  R48.8       3. Early onset Alzheimer's dementia without behavioral disturbance (HCC)  G30.0     F02.80             Visit Tracking:   POC expires Unit limit Auth Expiration date PT/OT + Visit Limit?   10/8/24 N/A  NO COG CODES 25 BOMN   25 N/A  NO COG CODES  24 BOMN    3/5/25 N/A  NO COG CODES   25 BOMN    25 N/A  NO COG CODES   25 BOMN             Visit/Unit Tracking  AUTH Status:  Date  3/26  RE   PU               No Used  2 1  2  3  4               Remaining   8 9  8  7  6                   Subjective/Behavioral:  - Patient pleasant and cooperative. Patient's wife present for today's session. No new concerns reported.     Objective/Assessment: Patient's wife reported going out to breakfast with friends a few days ago and reported patient knew who the friends were and \"had no problem\"      Short-term goals:  Patient will participate in continued therapeutic trials with 80% accuracy for continued assessment and recommendations for carryover and functional tasks at home, to be achieved in 4-6 weeks.   25: Patient required max hand-over-hand assist and clinician models to trace letters of last name. Patient observed forming non-orthographic symbols when prompted to write letters from dictation.     Patient will improve long term memory retrieval and recall of information during reminiscing tasks for creation of memory book with 80% accuracy, to be achieved in 4-6 weeks.   25: Discussed patients favorite foods and desserts. Patient requires mod-max prompting to engage in responses, suspected to be 2/2 language deficits; " patient benefits from fixed choices and verbal shaping of responses. He provided 4 IND comments and required verbal prompting for an additional 7 comments throughout structured and semi-structured conversations throughout 45 minute session (4/7 = 57% accuracy).   Patient and family will participate in creation and use of memory book with carry over in 90% of opportunities to facilitate improved overall function and quality of life, to be achieved in 4-6 weeks.  5/7/25: Patient required max cueing from spouse to participate in LTM/reminiscence tasks to provide personal information for development of memory book. Patient unable to fully spell last name - both verbally and in written form. Patient IND provided information regarding first few jobs obtained during early adulthood (teenage) years. Patient IND recalled name of former boss/manager.    Plan:  -Continue with current plan of care.

## 2025-05-14 ENCOUNTER — APPOINTMENT (OUTPATIENT)
Age: 70
End: 2025-05-14
Attending: PSYCHIATRY & NEUROLOGY
Payer: COMMERCIAL

## 2025-05-19 ENCOUNTER — APPOINTMENT (OUTPATIENT)
Age: 70
End: 2025-05-19
Attending: PSYCHIATRY & NEUROLOGY
Payer: COMMERCIAL

## 2025-05-21 ENCOUNTER — APPOINTMENT (OUTPATIENT)
Age: 70
End: 2025-05-21
Attending: PSYCHIATRY & NEUROLOGY
Payer: COMMERCIAL

## 2025-05-28 ENCOUNTER — EVALUATION (OUTPATIENT)
Age: 70
End: 2025-05-28
Attending: PSYCHIATRY & NEUROLOGY
Payer: COMMERCIAL

## 2025-05-28 DIAGNOSIS — R26.2 AMBULATORY DYSFUNCTION: Primary | ICD-10-CM

## 2025-05-28 PROCEDURE — 97164 PT RE-EVAL EST PLAN CARE: CPT

## 2025-05-28 PROCEDURE — 97530 THERAPEUTIC ACTIVITIES: CPT

## 2025-05-28 NOTE — PROGRESS NOTES
PT Re-Evaluation / Maintenance          POC expires Unit limit Auth Expiration date PT/OT + Visit Limit? Co-Insurance   12/23/24       3/5/25       5/26/25                     Visit/Unit Tracking  AUTH Status:  Date 1/6/25 1/13 1/23 2/3 2/19 2/24  2/26               Used                             Remaining                                      Today's date: 2025  Patient name: Jadon Ann  : 1955  MRN: [id number]  Referring provider: Abran Elizalde MD  Dx:   Encounter Diagnosis     ICD-10-CM    1. Ambulatory dysfunction  R26.2             Assessment  Assessment details: Patient is a 70 y.o. Male who presents to skilled outpatient PT with ambulatory dysfunction. Significant PMHx of dementia. Wife present throughout session. Since last progress note, patient demonstrates significant regression in all outcome measures below. Did have fall with head strike. Unable to get here due to rides and increased behavioral issues. Required hand over hand placement for transitions, and HHA for continuation of gait at all times Would highly recommend home health due to familiarity of environment and due to lack of transportation (patient and wife cannot drive). Continues to be limited by cueing, requiring hand over hand and one step. Pt continues to present with decreased activity tolerance, poor sequencing with activities- improved with one step commands, occasional hand over hand cueing required. Significant amount of time spent completing patient and family education on benefits to aerobic exercise, HEP, consistency of therapy, and prognosis. At this time, due to increased distractions and unfamiliar environment and patient behavior including anxiety over new places, would recommend transition to home health services due to lack of progress towards goals. Pt and wife are aware and agreeable. He will be discharged today with the goal of home  health.    Bebeto Huston (2013): a [place] District Court decision that sought to clarify that Medicare will in fact cover skilled therapy services to maintain a patient’s current condition or prevent/slow further deterioration.    Impairments: Abnormal coordination, Abnormal gait, Abnormal muscle tone, Abnormal or restricted ROM, Activity intolerance, Impaired balance, Impaired physical strength, Lacks appropriate HEP, Poor posture, Poor body mechanics, Pain with function, Safety issue, Weight-bearing intolerance, Abnormal movement, Difficulty understanding, Abnormal muscle firing  Understanding of Dx/Px/POC: Good  Prognosis: Good    Patient verbalized understanding of POC.       Please contact me if you have any questions or recommendations. Thank you for the referral and the opportunity to share in Jadon Ann's care.    Plan  Plan details: Will 1-2x/week  Patient would benefit from: PT Eval, Skilled PT, OT Eval, Skilled OT, Speech Eval, and Skilled Speech Therapy  Planned modality interventions: Biofeedback, Cryotherapy, TENS, Thermotherapy  Planned therapy interventions: Abdominal trunk stabilization, ADL training, Balance, Balance/WB training, Breathing training, Body mechanics training, Coordination, Functional ROM exercises, Gait training, HEP, Joint Mobilization, Manual Therapy, Dior taping, Motor coordination training, Neuromuscular re-education, Patient education, Postural training, Strengthening, Stretching, Therapeutic activities, Therapeutic exercises, Therapeutic training, Transfer training, Activity modification, Work reintegration  Plan of Care beginning date: 5/28/25      Goals  Short Term Goals (4 weeks):    - Patient will improve time on TUG by 2.9 seconds to facilitate improved safety in all ambulation  - Patient will be independent in basic HEP 2-3 weeks  - Patient will improve 5xSTS score by 2.3 seconds to promote improved LE functional strength needed for ADLs    Long  Term Goals (12 weeks):  - Patient will be independent in a comprehensive home exercise program  - Patient will improve gait speed by 0.18 m/s to improve safety with community ambulation  - Patient will improve LERMA by 6 points in order to improve static balance and reduce risk for falls  - Patient will be able to demonstrate HT in gait without veering  - Patient will improve 6 Minute Walk Test score by 190 feet to promote improved cardiovascular endurance  - Patient will report 50% reduction in near falls in order to improve safety with functional tasks and reduce his risk for falls  - Patient will report going on walks at least 3 days per week to promote independence and improved cardiovascular endurance  - Patient will be able to ascend/descend stairs reciprocally with 1 UE assist to promote independence and safety with ADLs  - Patient will report 50% reduction in near falls when ambulating on uneven terrain      UPDATED GOALS:  - Patient will ambulate outdoors on uneven surfaces with limited assistance to facilitate safe community ambulation  - Patient will maintain gait speed per 10 meter walk test at 0.6 m/s to facilitate continued safety with community ambulation  - Patient will continue to score at least 46/56 on LERMA to maintain fall risk status to reduce risk of injury  - Patient will be able to ambulate at least 1,000 ft during 6 Minute Walk Test to maintain current cardiovascular capacity  - Patient will attend PT 1x/week with focus on balance and gait training to maintain functional capacity  - Patient will maintain TUG      Cut off score  All date taken from [facility] Neuro Section or Rehab Measures    Lerma/56  MDC: 6 pts  Age Norms:  60-69: M - 55   F - 55  70-79: M - 54   F - 53  80-89: M - 53   F - 50 5xSTS: Amina et al 2010  MDC: 2.3 sec  Age Norms:  60-69: 11.4 sec  70-79: 12.6 sec  80-89: 14.8 sec   TUG  MDC: 4.14 sec  Cut off score:  >13.5 sec community dwelling adults  >32.2 frail elderly  <20 I  for basic transfers  >30 dependent on transfers 10 Meter Walk Test: Jair al 2011  MDC: 0.18 m/s  20-29: M - 1.35 m   F - 1.34 m  30-39: M - 1.43 m   F - 1.34 m  40-49: M - 1.43 m   F - 1.39 m  50-59: M - 1.43 m   F - 1.31 m  60-69: M - 1.34 m   F - 1.24 m  70-79: M - 1.26 m   F - 1.13 m  80-89: M - 0.97 m   F - 0.94 m    Household Ambulator < 0.4 m/s  Limited Community Ambulator 0.4 - 0.8 m/s  Community Ambulator 0.8 - 1.2 m/s  Safely cross the street > 1.2 m/s   FGA  MCID: 4 pts  Geriatrics/community < 22/30 fall risk  Geriatrics/community < 20/30 unexplained falls    DGI  MDC: vestibular - 4 pts  MDC: geriatric/community - 3 pts  Falls risk <19/24 mCTSIB  Norm: 20-60 yrs  Eyes open firm: norm sway 0.21-0.48  Eyes closed firm: norm sway 0.48-0.99  Eyes open foam: norm sway 0.38-0.71  Eyes closed foam: norm sway 0.70-2.22   6 Minute Walk Test  MDC: 190.98 ft  MCID: 164 ft    Age Norms  60-69: M - 1876 ft (571.80 m)  F - 1765 ft (537.98 m)  70-79: M - 1729 ft (527.00 m)  F - 1545 ft (470.92 m)  80-89: M - 1368 ft (416.97 m)  F - 1286 ft (391.97 m) ABC: Candi & Haseeb, 2003  <67% increased risk for falls   [employer]  Evaluator Size:        Force (grams):          Hand/Dorsal Thresholds:        Plantar Thresholds:  - 1.65                       - 0.008                       - Normal                                 - Normal  - 2.36                       - 0.02                         - Normal                                 - Normal  - 2.44                       - 0.04                         - Normal                                 - Normal  - 2.83                       - 0.07                         - Normal                                 - Normal  - 3.22                       - 0.16                         - Diminished light touch          - Normal  - 3.61                       - 0.40                         - Diminished light touch          - Normal  - 3.84                        - 0.60                         - Diminished protective           - Diminished light touch  - 4.08                       - 1.00                         - Diminished protective           - Diminished light touch  - 4.17                       - 1.40                         - Diminished protective           - Diminished light touch  - 4.31                       - 2.00                         - Diminished protective           - Diminished light touch  - 4.56                       - 4.00                         - Loss of protective sense      - Diminished protective  - 4.74                       - 6.00                         - Loss of protective sense      - Diminished protective  - 4.93                       - 8.00                         - Loss of protective sense      - Diminished protective  - 5.07                       - 10.0                         - Loss of protective sense     - Loss of protective sense  - 5.18                       - 15.0                         - Loss of protective sense     - Loss of protective sense  - 5.46                       - 26.0                         - Loss of protective sense     - Loss of protective sense  - 5.88                       - 60.0                         - Loss of protective sense     - Loss of protective sense  - 6.10                       - 100                          - Loss of protective sense     - Loss of protective sense  - 6.45                       - 180                          - Loss of protective sense     - Loss of protective sense  - 6.65                       - 300                          - Deep pressure sense only  - Deep pressure sense only         Subjective    History of Present Illness  - Mechanism of injury: has bike at home- interested in going to the gym but is concerned for safety. Has sig PMHx of dementia. Wife present and assisted with history. He requires assist for ADLs and iADLs. Difficulty with sequencing and has FOF.    UPDATE  24: Pt reports they missed last week due to personal issues. They are staying busy at home.     UPDATE 24: Patient and wife arrive to PT. Nothing new to report. Son drove them to PT.     UPDATE 25: Wife reports patient is anxious about coming to erapy at times, is worried about being left. He is doing his exercises at home.     UPDATE 25: Pt wife present throughout session    UPDATE 25: Pt arrived with wife. Recent POC complicated by fall and headstrike. Pt has ongoing cough from pneumonia last month. Has been increasingly more difficult to do things outside of the home. Has more behavioral issues     - Primary AD: none    Patient goal: improve confidence with walking    Pain  - Current pain ratin/10    Social Support  - Steps to enter house: 4 JAMARI   - Stairs in house: 2 story- has first floor set up has FF with HR   - Lives with: wife  - Hand dominance: R    Objective  LE MMT  - R Hip Flexion: 4/5   L Hip Flexion: 4/5  - R Knee Extension: 4/5  L Knee Extension: 4/5  - R Knee Flexion: 4-/5   L Knee Flexion: 4-/5  - R Ankle DF: 4/5   L Ankle DF: 4/5  - R Ankle PF: 4/5   L Ankle PF: 4/5    Sensation  - Light touch: wfl    - Temperature: subjective yes    Coordination  - Heel to Shin: unable to complete  - Alternate Toe Taps: unable to complete    Postural Screen  - Observation: moderate forward head and rounded shoulders    Gait  - Abnormalities: dec step length B/L, with Lateral deviation towrads the L       Outcome Measures Initial Eval     5xSTS 31.61 sec no UE 23.91s no UE 20.73s no UE 19.49s   1 episode of freezing/ anxiety about where chair is 26.61s BUE    29.96s UE on PT 33.01s BUE    39.86s UE on PT       61.62s on second trial    45.75s UE with PT hands   TUG  - Regular  - Cognitive   25.53 sec   22.83s    21.5s   17.75s   22.91s 28s (difficulty sitting in chair) 1:04s    10 meter 0.62 m/s 0.6m/s 0.61m/s 0.7m/s  0.68m/s 0.76m/s with HHA VC for  inc speed 0.41m/s   LERMA 31/56 42/56 46/56 46/56 47/56 43/56 cueing max 28/56   mCTSIB  - FTEO (firm)  - FTEC (firm)  - FTEO (foam)  - FTEC (foam)   30 sec  30 sec  NT   30  30       NT NT NT NT    6MWT 800 ft GB 900ft GB 1,050ft 1 HHA 1,100ft no HHA just distant supervision 1,100ft no HHA just distanct supervision 800ft HHA 558ft   ABC 35.63%                     NuStep 8'  STS with Y MB 10x  Amb holding 6# MB 200ft x3        Precautions: dementia  No past medical history on file.

## 2025-05-29 ENCOUNTER — TELEPHONE (OUTPATIENT)
Age: 70
End: 2025-05-29

## 2025-05-29 NOTE — TELEPHONE ENCOUNTER
Patient's wife called in requesting referral for home services. In order for him to obtain Physical Therapy and Speech at home .     Please fax Home health services at ,  Fax to :[phone] Attn : Marianna at [facility].    Thanks

## 2025-06-02 ENCOUNTER — APPOINTMENT (EMERGENCY)
Dept: RADIOLOGY | Facility: HOSPITAL | Age: 70
End: 2025-06-02
Payer: COMMERCIAL

## 2025-06-02 ENCOUNTER — HOSPITAL ENCOUNTER (EMERGENCY)
Facility: HOSPITAL | Age: 70
Discharge: HOME | End: 2025-06-02
Attending: EMERGENCY MEDICINE
Payer: COMMERCIAL

## 2025-06-02 VITALS
RESPIRATION RATE: 22 BRPM | SYSTOLIC BLOOD PRESSURE: 97 MMHG | TEMPERATURE: 97.5 F | OXYGEN SATURATION: 97 % | HEART RATE: 65 BPM | DIASTOLIC BLOOD PRESSURE: 63 MMHG

## 2025-06-02 DIAGNOSIS — Z86.59 HISTORY OF DEMENTIA: ICD-10-CM

## 2025-06-02 DIAGNOSIS — R53.83 FATIGUE: Primary | ICD-10-CM

## 2025-06-02 LAB
ALBUMIN SERPL BCG-MCNC: 3.7 G/DL (ref 3.5–5)
ALP SERPL-CCNC: 86 U/L (ref 34–104)
ALT SERPL W P-5'-P-CCNC: 9 U/L (ref 7–52)
ANION GAP SERPL CALCULATED.3IONS-SCNC: 3 MMOL/L (ref 4–13)
AST SERPL W P-5'-P-CCNC: 14 U/L (ref 13–39)
ATRIAL RATE: 61 BPM
ATRIAL RATE: 62 BPM
BASOPHILS # BLD AUTO: 0.07 THOUSANDS/ÂΜL (ref 0–0.1)
BASOPHILS NFR BLD AUTO: 1 % (ref 0–1)
BILIRUB SERPL-MCNC: 0.59 MG/DL (ref 0.2–1)
BUN SERPL-MCNC: 14 MG/DL (ref 5–25)
CALCIUM SERPL-MCNC: 9.2 MG/DL (ref 8.4–10.2)
CARDIAC TROPONIN I PNL SERPL HS: 3 NG/L (ref ?–50)
CHLORIDE SERPL-SCNC: 105 MMOL/L (ref 96–108)
CO2 SERPL-SCNC: 30 MMOL/L (ref 21–32)
CREAT SERPL-MCNC: 0.74 MG/DL (ref 0.6–1.3)
EOSINOPHIL # BLD AUTO: 0.29 THOUSAND/ÂΜL (ref 0–0.61)
EOSINOPHIL NFR BLD AUTO: 4 % (ref 0–6)
ERYTHROCYTE [DISTWIDTH] IN BLOOD BY AUTOMATED COUNT: 13.7 % (ref 11.6–15.1)
GFR SERPL CREATININE-BSD FRML MDRD: 92 ML/MIN/1.73SQ M
GLUCOSE SERPL-MCNC: 94 MG/DL (ref 65–140)
HCT VFR BLD AUTO: 46.3 % (ref 36.5–49.3)
HGB BLD-MCNC: 15 G/DL (ref 12–17)
IMM GRANULOCYTES # BLD AUTO: 0.02 THOUSAND/UL (ref 0–0.2)
IMM GRANULOCYTES NFR BLD AUTO: 0 % (ref 0–2)
LYMPHOCYTES # BLD AUTO: 2.25 THOUSANDS/ÂΜL (ref 0.6–4.47)
LYMPHOCYTES NFR BLD AUTO: 27 % (ref 14–44)
MCH RBC QN AUTO: 29.6 PG (ref 26.8–34.3)
MCHC RBC AUTO-ENTMCNC: 32.4 G/DL (ref 31.4–37.4)
MCV RBC AUTO: 91 FL (ref 82–98)
MONOCYTES # BLD AUTO: 0.82 THOUSAND/ÂΜL (ref 0.17–1.22)
MONOCYTES NFR BLD AUTO: 10 % (ref 4–12)
NEUTROPHILS # BLD AUTO: 4.78 THOUSANDS/ÂΜL (ref 1.85–7.62)
NEUTS SEG NFR BLD AUTO: 58 % (ref 43–75)
NRBC BLD AUTO-RTO: 0 /100 WBCS
P AXIS: 118 DEGREES
P AXIS: 40 DEGREES
P AXIS: 52 DEGREES
P AXIS: 75 DEGREES
PLATELET # BLD AUTO: 271 THOUSANDS/UL (ref 149–390)
PMV BLD AUTO: 9.8 FL (ref 8.9–12.7)
POTASSIUM SERPL-SCNC: 4.5 MMOL/L (ref 3.5–5.3)
PR INTERVAL: 178 MS
PR INTERVAL: 182 MS
PR INTERVAL: 192 MS
PR INTERVAL: 214 MS
PROT SERPL-MCNC: 6.3 G/DL (ref 6.4–8.4)
QRS AXIS: -74 DEGREES
QRS AXIS: -80 DEGREES
QRS AXIS: -81 DEGREES
QRS AXIS: -82 DEGREES
QRSD INTERVAL: 106 MS
QRSD INTERVAL: 112 MS
QRSD INTERVAL: 114 MS
QRSD INTERVAL: 116 MS
QT INTERVAL: 412 MS
QT INTERVAL: 428 MS
QT INTERVAL: 428 MS
QT INTERVAL: 442 MS
QTC INTERVAL: 414 MS
QTC INTERVAL: 430 MS
QTC INTERVAL: 434 MS
QTC INTERVAL: 444 MS
RBC # BLD AUTO: 5.07 MILLION/UL (ref 3.88–5.62)
SODIUM SERPL-SCNC: 138 MMOL/L (ref 135–147)
T WAVE AXIS: 54 DEGREES
T WAVE AXIS: 55 DEGREES
T WAVE AXIS: 59 DEGREES
T WAVE AXIS: 69 DEGREES
VENTRICULAR RATE: 61 BPM
VENTRICULAR RATE: 62 BPM
WBC # BLD AUTO: 8.23 THOUSAND/UL (ref 4.31–10.16)

## 2025-06-02 PROCEDURE — 84484 ASSAY OF TROPONIN QUANT: CPT

## 2025-06-02 PROCEDURE — 99284 EMERGENCY DEPT VISIT MOD MDM: CPT

## 2025-06-02 PROCEDURE — 36415 COLL VENOUS BLD VENIPUNCTURE: CPT

## 2025-06-02 PROCEDURE — 93010 ELECTROCARDIOGRAM REPORT: CPT | Performed by: INTERNAL MEDICINE

## 2025-06-02 PROCEDURE — 99285 EMERGENCY DEPT VISIT HI MDM: CPT | Performed by: EMERGENCY MEDICINE

## 2025-06-02 PROCEDURE — 80053 COMPREHEN METABOLIC PANEL: CPT

## 2025-06-02 PROCEDURE — 85025 COMPLETE CBC W/AUTO DIFF WBC: CPT

## 2025-06-02 PROCEDURE — 93005 ELECTROCARDIOGRAM TRACING: CPT

## 2025-06-02 PROCEDURE — 71045 X-RAY EXAM CHEST 1 VIEW: CPT

## 2025-06-02 NOTE — ED PROVIDER NOTES
Time reflects when diagnosis was documented in both MDM as applicable and the Disposition within this note       Time User Action Codes Description Comment    6/2/2025 12:03 PM Tor Leary [R53.83] Fatigue     6/2/2025 12:04 PM Tor Leary [Z86.59] History of dementia           ED Disposition       ED Disposition   Discharge    Condition   Stable    Date/Time   Mon Jun 2, 2025 12:03 PM    Comment   Jadon Jamilwes discharge to home/self care.                   Assessment & Plan       Medical Decision Making  Patient is a 69 y/o male with past medical history of Alzheimer's dementia presenting to the ED for evaluation of fatigue. Upon evaluation of the patient and obtaining history from family, they stated that his condition was completely back to his baseline. Patient was alert and oriented only to self, which is his baseline. He offered no complaints and stated he felt like his normal self. He required some repeated prompting when given commands, but followed them appropriately, which the family states is usual for him. Family states he ambulates well at home without assistance, and walks around frequently. He had 5/5 strength in all extremities and had no sensory deficits. Witnessed ambulation without assistance and without unsteadiness or abnormal gait.     Plan to check baseline labs and troponin, as well as CXR given recent pneumonia to assess for any leukocytosis indicative of infection, anemia, electrolyte disturbance, renal function, hepatic function, and ACS.     EKG performed demonstrating sinus rhythm with rate of 61 bpm. Left axis deviation. No ST segment elevations or depressions. No prolonged QT interval or MO interval.    Workup was overall unremarkable and CXR with no evidence of consolidations. Patient remains at baseline mental status with no further complaints. Patient is appropriate for discharge at this time. Suspect his fatigue could be secondary to having to wake up earlier than  usual, compounded by his baseline dementia. Reviewed return precautions with the patient's wife and son. Recommended continued monitoring for recurrent changes in mental status. Patient's family understands and agrees to the stated plan.     Amount and/or Complexity of Data Reviewed  Radiology: ordered.             Medications - No data to display    ED Risk Strat Scores                    No data recorded        SBIRT 20yo+      Flowsheet Row Most Recent Value   Initial Alcohol Screen: US AUDIT-C     1. How often do you have a drink containing alcohol? 0 Filed at: 06/02/2025 1035   2. How many drinks containing alcohol do you have on a typical day you are drinking?  0 Filed at: 06/02/2025 1035   3b. FEMALE Any Age, or MALE 65+: How often do you have 4 or more drinks on one occassion? 0 Filed at: 06/02/2025 1035   Audit-C Score 0 Filed at: 06/02/2025 1035   LEYLA: How many times in the past year have you...    Used an illegal drug or used a prescription medication for non-medical reasons? Never Filed at: 06/02/2025 1035                            History of Present Illness       Chief Complaint   Patient presents with    Fatigue     Per ems pt from home, hx of dementia, per family pt woke up earlier than normal today and seems more tired than normal        Past Medical History[1]   Past Surgical History[2]   Family History[3]   Social History[4]   E-Cigarette/Vaping    E-Cigarette Use Former User       E-Cigarette/Vaping Substances    Nicotine No     THC No     CBD No     Flavoring No     Other No     Unknown No       I have reviewed and agree with the history as documented.     Jadon is a 70-year-old male with past medical history of Alzheimer's dementia presenting to the emergency department for evaluation of fatigue. Patient's wife at bedside provides majority of the history. She states that she woke her  up around 8 am this morning, earlier than he usually does, as he had a physical therapy appointment.  She states he normally wakes up around 10 or 11 am. She was concerned as the patient appeared very fatigued and was not as responsive as he normally is to questions or commands. Patient's wife reports recent pneumonia that was treated, but otherwise has been at his baseline health and mental status for the past week. The patient himself has no complaints and states he feels fine.       History provided by:  Spouse and relative  History limited by:  Dementia   used: No        Review of Systems   Constitutional:  Positive for fatigue.   HENT: Negative.     Respiratory: Negative.     Cardiovascular: Negative.    Gastrointestinal: Negative.    Genitourinary: Negative.    Musculoskeletal: Negative.    Neurological: Negative.    All other systems reviewed and are negative.          Objective       ED Triage Vitals   Temperature Pulse Blood Pressure Respirations SpO2 Patient Position - Orthostatic VS   06/02/25 0951 06/02/25 0951 06/02/25 0951 06/02/25 0951 06/02/25 0951 --   97.5 °F (36.4 °C) 64 114/62 18 97 %       Temp Source Heart Rate Source BP Location FiO2 (%) Pain Score    06/02/25 0951 06/02/25 1000 -- -- --    Temporal Monitor         Vitals      Date and Time Temp Pulse SpO2 Resp BP Pain Score FACES Pain Rating User   06/02/25 1200 -- 65 97 % 22 97/63 -- -- AM   06/02/25 1030 -- 63 96 % 16 106/64 -- -- AM   06/02/25 1000 -- 62 95 % 14 99/55 -- -- AM   06/02/25 0951 97.5 °F (36.4 °C) 64 97 % 18 114/62 -- -- AM            Physical Exam  Vitals and nursing note reviewed.   Constitutional:       General: He is not in acute distress.     Appearance: Normal appearance. He is not ill-appearing.   HENT:      Head: Normocephalic.      Nose: Nose normal.      Mouth/Throat:      Mouth: Mucous membranes are moist.      Pharynx: Oropharynx is clear.     Eyes:      General:         Right eye: No discharge.         Left eye: No discharge.      Extraocular Movements: Extraocular movements intact.       Conjunctiva/sclera: Conjunctivae normal.      Pupils: Pupils are equal, round, and reactive to light.       Cardiovascular:      Rate and Rhythm: Regular rhythm.      Pulses: Normal pulses.   Pulmonary:      Effort: Pulmonary effort is normal. No respiratory distress.      Breath sounds: Normal breath sounds.   Abdominal:      General: Abdomen is flat.      Palpations: Abdomen is soft.      Tenderness: There is no abdominal tenderness. There is no right CVA tenderness, left CVA tenderness, guarding or rebound.     Musculoskeletal:      Cervical back: No tenderness.      Right lower leg: No edema.      Left lower leg: No edema.     Skin:     General: Skin is warm and dry.      Capillary Refill: Capillary refill takes less than 2 seconds.     Neurological:      General: No focal deficit present.      Comments: Oriented to self, at his baseline with history of dementia   Psychiatric:         Mood and Affect: Mood normal.         Behavior: Behavior normal.         Results Reviewed       Procedure Component Value Units Date/Time    HS Troponin 0hr (reflex protocol) [[id number]]  (Normal) Collected: 06/02/25 1016    Lab Status: Final result Specimen: Blood from Arm, Right Updated: 06/02/25 1050     hs TnI 0hr 3 ng/L     Comprehensive metabolic panel [[id number]]  (Abnormal) Collected: 06/02/25 1016    Lab Status: Final result Specimen: Blood from Arm, Right Updated: 06/02/25 1037     Sodium 138 mmol/L      Potassium 4.5 mmol/L      Chloride 105 mmol/L      CO2 30 mmol/L      ANION GAP 3 mmol/L      BUN 14 mg/dL      Creatinine 0.74 mg/dL      Glucose 94 mg/dL      Calcium 9.2 mg/dL      AST 14 U/L      ALT 9 U/L      Alkaline Phosphatase 86 U/L      Total Protein 6.3 g/dL      Albumin 3.7 g/dL      Total Bilirubin 0.59 mg/dL      eGFR 92 ml/min/1.73sq m     Narrative:      National Kidney Disease Foundation guidelines for Chronic Kidney Disease (CKD):     Stage 1 with normal or high GFR (GFR > 90 mL/min/1.73 square  meters)    Stage 2 Mild CKD (GFR = 60-89 mL/min/1.73 square meters)    Stage 3A Moderate CKD (GFR = 45-59 mL/min/1.73 square meters)    Stage 3B Moderate CKD (GFR = 30-44 mL/min/1.73 square meters)    Stage 4 Severe CKD (GFR = 15-29 mL/min/1.73 square meters)    Stage 5 End Stage CKD (GFR <15 mL/min/1.73 square meters)  Note: GFR calculation is accurate only with a steady state creatinine    CBC and differential [[id number]] Collected: 25 1016    Lab Status: Final result Specimen: Blood from Arm, Right Updated: 25 1022     WBC 8.23 Thousand/uL      RBC 5.07 Million/uL      Hemoglobin 15.0 g/dL      Hematocrit 46.3 %      MCV 91 fL      MCH 29.6 pg      MCHC 32.4 g/dL      RDW 13.7 %      MPV 9.8 fL      Platelets 271 Thousands/uL      nRBC 0 /100 WBCs      Segmented % 58 %      Immature Grans % 0 %      Lymphocytes % 27 %      Monocytes % 10 %      Eosinophils Relative 4 %      Basophils Relative 1 %      Absolute Neutrophils 4.78 Thousands/µL      Absolute Immature Grans 0.02 Thousand/uL      Absolute Lymphocytes 2.25 Thousands/µL      Absolute Monocytes 0.82 Thousand/µL      Eosinophils Absolute 0.29 Thousand/µL      Basophils Absolute 0.07 Thousands/µL             XR chest 1 view portable   Final Interpretation by Gilberto Reeves DO ( [id number])      Emphysematous changes.  No focal consolidation, pleural effusion, or pneumothorax.            Workstation performed: [id number]             Procedures    ED Medication and Procedure Management   Prior to Admission Medications   Prescriptions Last Dose Informant Patient Reported? Taking?   Cholecalciferol (VITAMIN D-3 PO)  Spouse/Significant Other Yes No   Sig: Take by mouth in the morning   LORazepam (ATIVAN) 1 mg tablet  Spouse/Significant Other No No   SiTablet half an hour prior to the MRI   Patient not taking: Reported on 10/20/2023   Multiple Vitamin (multivitamin) capsule  Spouse/Significant Other Yes No   Sig: Take 1 capsule by mouth  daily   Omega-3 Fatty Acids (FISH OIL PO)  Spouse/Significant Other Yes No   Sig: Take by mouth in the morning   TURMERIC PO  Spouse/Significant Other Yes No   Sig: Take by mouth in the morning   donepezil (ARICEPT) 10 mg tablet   No No   Sig: TAKE 1 TABLET BY MOUTH DAILY AT BEDTIME   memantine (NAMENDA) 10 mg tablet   No No   Sig: TAKE 1 TABLET BY MOUTH TWICE A DAY   naproxen (NAPROSYN) 500 mg tablet  Spouse/Significant Other No No   Sig: Take 1 tablet (500 mg total) by mouth 2 (two) times a day as needed for mild pain (take with food) for up to 20 doses   Patient not taking: Reported on 4/18/2024   oxyCODONE (ROXICODONE) 5 mg immediate release tablet  Spouse/Significant Other No No   Sig: Take 1 tablet (5 mg total) by mouth every 4 (four) hours as needed for moderate pain for up to 5 doses Max Daily Amount: 30 mg   Patient not taking: Reported on 4/18/2024      Facility-Administered Medications: None     Discharge Medication List as of 6/2/2025 12:06 PM        CONTINUE these medications which have NOT CHANGED    Details   Cholecalciferol (VITAMIN D-3 PO) Take by mouth in the morning, Historical Med      donepezil (ARICEPT) 10 mg tablet TAKE 1 TABLET BY MOUTH DAILY AT BEDTIME, Starting Thu 2/6/2025, Normal      LORazepam (ATIVAN) 1 mg tablet 1Tablet half an hour prior to the MRI, Normal      memantine (NAMENDA) 10 mg tablet TAKE 1 TABLET BY MOUTH TWICE A DAY, Starting Mon 3/17/2025, Normal      Multiple Vitamin (multivitamin) capsule Take 1 capsule by mouth daily, Historical Med      naproxen (NAPROSYN) 500 mg tablet Take 1 tablet (500 mg total) by mouth 2 (two) times a day as needed for mild pain (take with food) for up to 20 doses, Starting Thu 4/26/2018, Print      Omega-3 Fatty Acids (FISH OIL PO) Take by mouth in the morning, Historical Med      oxyCODONE (ROXICODONE) 5 mg immediate release tablet Take 1 tablet (5 mg total) by mouth every 4 (four) hours as needed for moderate pain for up to 5 doses Max Daily  Amount: 30 mg, Starting Sun 5/27/2018, Print      TURMERIC PO Take by mouth in the morning, Historical Med           No discharge procedures on file.  ED SEPSIS DOCUMENTATION   Time reflects when diagnosis was documented in both MDM as applicable and the Disposition within this note       Time User Action Codes Description Comment    6/2/2025 12:03 PM Tor Leary [R53.83] Fatigue     6/2/2025 12:04 PM Tor Leary [Z86.59] History of dementia                    [1] No past medical history on file.  [2] No past surgical history on file.  [3] No family history on file.  [4]   Social History  Tobacco Use    Smoking status: Former     Current packs/day: 1.00     Types: Cigarettes    Smokeless tobacco: Never   Vaping Use    Vaping status: Former   Substance Use Topics    Alcohol use: No     Comment: occ    Drug use: No        Tor Leary PA-C  06/02/25 1804

## 2025-06-02 NOTE — DISCHARGE INSTRUCTIONS
Jadon appears at his baseline based on our assessment in the ER today. There were no concerning findings on his blood work or chest x-ray.     Return to the ER for any changes in mental status or new symptoms that concern you.

## 2025-06-02 NOTE — ED ATTENDING ATTESTATION
6/2/2025  IMary MD, saw and evaluated the patient. I have discussed the patient with the resident/non-physician practitioner and agree with the resident's/non-physician practitioner's findings, Plan of Care, and MDM as documented in the resident's/non-physician practitioner's note, except where noted. All available labs and Radiology studies were reviewed.  I was present for key portions of any procedure(s) performed by the resident/non-physician practitioner and I was immediately available to provide assistance.       At this point I agree with the current assessment done in the Emergency Department.  I have conducted an independent evaluation of this patient a history and physical is as follows:    ED Course     71 yo M presents with fatigue. Has history of dementia, family says less responsive this morning as they woke him early for an appointment. He is now back to baseline. Labs unremarkable and reassuring. Family comfortable with taking patient home.    General: VSS, NAD, awake, alert. Well-nourished, well-developed. Appears stated age.   Speaking normally in full sentences.   Head: Normocephalic, atraumatic, nontender.  Eyes: PERRL, EOM-I. No diplopia.   No hyphema.   No subconjunctival hemorrhages.  Symmetrical lids.   ENT: Atraumatic external nose and ears.    MMM  No malocclusion. No stridor. Normal phonation. No drooling. Normal swallowing.   Neck: Symmetric, trachea midline. No JVD.  CV: RRR. +S1/S2  No murmurs or gallops  Peripheral pulses +2 throughout. No chest wall tenderness.   Lungs:   Unlabored No retractions  CTAB, lungs sounds equal bilateral.   No tachypnea.   Abd: +BS, soft, NT/ND.   MSK:   FROM   Back:   No rashes  Skin: Dry, intact.   Neuro: AAOx3, GCS 14, CN II-XII grossly intact.   Motor grossly intact.  Psychiatric/Behavioral: Baseline mental status   Exam: deferred     Critical Care Time  Procedures

## 2025-06-10 ENCOUNTER — TELEPHONE (OUTPATIENT)
Age: 70
End: 2025-06-10

## 2025-06-10 DIAGNOSIS — F02.80 EARLY ONSET ALZHEIMER'S DEMENTIA WITHOUT BEHAVIORAL DISTURBANCE (HCC): ICD-10-CM

## 2025-06-10 DIAGNOSIS — G30.0 EARLY ONSET ALZHEIMER'S DEMENTIA WITHOUT BEHAVIORAL DISTURBANCE (HCC): ICD-10-CM

## 2025-06-10 RX ORDER — MEMANTINE HYDROCHLORIDE 10 MG/1
10 TABLET ORAL 2 TIMES DAILY
Qty: 180 TABLET | Refills: 0 | Status: SHIPPED | OUTPATIENT
Start: 2025-06-10

## 2025-06-10 NOTE — TELEPHONE ENCOUNTER
Mary Jane from [facility] calling to inform Dr Elizalde that she did see pt today for OT. Will continue too see him once a week for 8 weeks for OT.     JAY

## 2025-06-18 ENCOUNTER — TELEPHONE (OUTPATIENT)
Dept: NEUROLOGY | Facility: CLINIC | Age: 70
End: 2025-06-18

## 2025-06-18 DIAGNOSIS — G30.0 EARLY ONSET ALZHEIMER'S DEMENTIA WITHOUT BEHAVIORAL DISTURBANCE (HCC): ICD-10-CM

## 2025-06-18 DIAGNOSIS — R26.2 AMBULATORY DYSFUNCTION: Primary | ICD-10-CM

## 2025-06-18 DIAGNOSIS — F02.80 EARLY ONSET ALZHEIMER'S DEMENTIA WITHOUT BEHAVIORAL DISTURBANCE (HCC): ICD-10-CM

## 2025-06-18 NOTE — TELEPHONE ENCOUNTER
Brian called the RX Refill Line. Message is being forwarded to the office.     Brian from [facility] therpat call;ed the get a script for a commode sent over to [employer]    Please contact Brian at [phone]

## 2025-06-19 NOTE — TELEPHONE ENCOUNTER
Called [facility] and spoke with Jessica, who provided the fax # [phone].    [facility] chair order faxed to # provided.

## 2025-07-10 ENCOUNTER — TELEPHONE (OUTPATIENT)
Age: 70
End: 2025-07-10

## 2025-07-10 NOTE — TELEPHONE ENCOUNTER
Received a call from [facility]. They did not receive the Home Health plan of care that was faxed on 7/8/2025. Faxed the POC to the number provided from Ann, which was [phone].

## 2025-07-11 NOTE — PROGRESS NOTES
Skilled Maintenance Note     Today's date: 2025  Patient name: Jadon Ann  : 1955  MRN: [id number]  Referring provider: Abran Elizalde MD  Dx:   Encounter Diagnosis     ICD-10-CM    1. Ambulatory dysfunction  R26.2           Start Time:   Stop Time:   Total time in clinic (min): 38 minutes    Subjective: Patient's wife reports no new complaints.       Objective: See treatment diary below Wife present throughout session.    Ankle weights donned 3# ea B/L  NuStep Lvl 2 x 8 min, seat 11, UE 10  STS with Y MB x10  Amb holding YMB 200ft x3  Speed amb with L 'x2  Fwd/bwd amb 25ft x 8 laps -  NT  Ball toss fwd/bwd 20 catches to PT -  NT  Stair  6 laps 1 UE        Assessment:   Patient participated in skilled maintenance PT session focused on maintaining current level of mobility.  Continues to be limited by cueing, requiring hand over hand and one step, however able to complete without cueing with increased repetition. Patient would continue to benefit from skilled maintenance  PT interventions to maintain current level of function and mobility.  Patient demonstrated fatigue post treatment      Plan: Continue per plan of care.        POC expires Unit limit Auth Expiration date PT/OT + Visit Limit? Co-Insurance   12/23/24       3/5/25                            Visit/Unit Tracking  AUTH Status:  Date 25                     Used                             Remaining                                          yes

## 2025-07-16 ENCOUNTER — OFFICE VISIT (OUTPATIENT)
Dept: CARDIOLOGY CLINIC | Facility: CLINIC | Age: 70
End: 2025-07-16
Payer: COMMERCIAL

## 2025-07-16 VITALS
HEART RATE: 79 BPM | RESPIRATION RATE: 16 BRPM | WEIGHT: 133 LBS | SYSTOLIC BLOOD PRESSURE: 112 MMHG | BODY MASS INDEX: 19.04 KG/M2 | OXYGEN SATURATION: 94 % | DIASTOLIC BLOOD PRESSURE: 72 MMHG | HEIGHT: 70 IN

## 2025-07-16 DIAGNOSIS — R94.31 ABNORMAL EKG: Primary | ICD-10-CM

## 2025-07-16 DIAGNOSIS — G30.0 EARLY ONSET ALZHEIMER'S DEMENTIA WITHOUT BEHAVIORAL DISTURBANCE (HCC): ICD-10-CM

## 2025-07-16 DIAGNOSIS — F02.80 EARLY ONSET ALZHEIMER'S DEMENTIA WITHOUT BEHAVIORAL DISTURBANCE (HCC): ICD-10-CM

## 2025-07-16 PROCEDURE — 99203 OFFICE O/P NEW LOW 30 MIN: CPT | Performed by: INTERNAL MEDICINE

## 2025-07-16 NOTE — PROGRESS NOTES
Cardiology Consultation     Jadon Ann  [id number]  1955  [address] [facility] [place]  [address] [facility]  [facility] [address]    This patient presents for cardiology consultation and evaluation.  Patient does have a history of dementia Alzheimer's and has been followed by neurology.  Patient presented with fatigue to the emergency room and was found to have an abnormal EKG and recommended cardiology follow-up.  Patient is a poor historian because of dementia.  Family present during the office visit.  Patient does not have any history of chest pain shortness of breath.  No previous history of CAD or MI in the past.  No history of diabetes hypertension or hyperlipidemia.  Patient regularly followed by primary care physician as well as neurology.    1. Abnormal EKG  Echo complete w/ contrast if indicated      2. Early onset Alzheimer's dementia without behavioral disturbance (HCC)          Problem List[1]  Past Medical History[2]  Social History     Socioeconomic History    Marital status: /Civil Union     Spouse name: Not on file    Number of children: Not on file    Years of education: Not on file    Highest education level: Not on file   Occupational History    Not on file   Tobacco Use    Smoking status: Former     Current packs/day: 1.00     Types: Cigarettes    Smokeless tobacco: Never   Vaping Use    Vaping status: Former   Substance and Sexual Activity    Alcohol use: No     Comment: occ    Drug use: No    Sexual activity: Yes     Partners: Female   Other Topics Concern    Not on file   Social History Narrative    Not on file     Social Drivers of Health     Financial Resource Strain: Not At Risk (4/7/2025)    Received from [facility]    Financial Insecurity     In the last 12 months did you skip medications to save money?: No     In the last 12 months was there a time when you  "needed to see a doctor but could not because of cost?: No   Food Insecurity: No Food Insecurity (4/7/2025)    Received from [facility]    Food Insecurity     In the last 12 months did you ever eat less than you felt you should because there wasn't enough money for food?: No   Transportation Needs: Unmet Transportation Needs (4/7/2025)    Received from [facility]    Transportation Needs     In the last 12 months have you ever had to go without healthcare because you didn't have a way to get there?: Yes   Physical Activity: Not on file   Stress: Not on file   Social Connections: Socially Integrated (4/7/2025)    Received from [facility]    Social Connection     Do you often feel lonely?: No   Intimate Partner Violence: Not on file   Housing Stability: Not At Risk (4/7/2025)    Received from [facility]    Housing Stability     Are you worried that in the next 2 months you may not have stable housing?: No      Family History[3]  Past Surgical History[4]  Current Medications[5]  No Known Allergies  Vitals:    07/16/25 1453   BP: 112/72   BP Location: Right arm   Patient Position: Sitting   Cuff Size: Standard   Pulse: 79   Resp: 16   SpO2: 94%   Weight: 60.3 kg (133 lb)   Height: 5' 10\" (1.778 m)       Labs:  Admission on 06/02/2025, Discharged on 06/02/2025   Component Date Value    Ventricular Rate 06/02/2025 62     Atrial Rate 06/02/2025 62     IA Interval 06/02/2025 192     QRSD Interval 06/02/2025 116     QT Interval 06/02/2025 428     QTC Interval 06/02/2025 434     P Axis 06/02/2025 75     QRS Axis 06/02/2025 -74     T Wave [place] 06/02/2025 59     WBC 06/02/2025 8.23     RBC 06/02/2025 5.07     Hemoglobin 06/02/2025 15.0     Hematocrit 06/02/2025 46.3     MCV 06/02/2025 91     MCH 06/02/2025 29.6     MCHC 06/02/2025 32.4     RDW 06/02/2025 13.7     MPV 06/02/2025 9.8     Platelets 06/02/2025 271     nRBC 06/02/2025 0     Segmented % 06/02/2025 " 58     Immature Grans % 06/02/2025 0     Lymphocytes % 06/02/2025 27     Monocytes % 06/02/2025 10     Eosinophils Relative 06/02/2025 4     Basophils Relative 06/02/2025 1     Absolute Neutrophils 06/02/2025 4.78     Absolute Immature Grans 06/02/2025 0.02     Absolute Lymphocytes 06/02/2025 2.25     Absolute Monocytes 06/02/2025 0.82     Eosinophils Absolute 06/02/2025 0.29     Basophils Absolute 06/02/2025 0.07     Sodium 06/02/2025 138     Potassium 06/02/2025 4.5     Chloride 06/02/2025 105     CO2 06/02/2025 30     ANION GAP 06/02/2025 3 (L)     BUN 06/02/2025 14     Creatinine 06/02/2025 0.74     Glucose 06/02/2025 94     Calcium 06/02/2025 9.2     AST 06/02/2025 14     ALT 06/02/2025 9     Alkaline Phosphatase 06/02/2025 86     Total Protein 06/02/2025 6.3 (L)     Albumin 06/02/2025 3.7     Total Bilirubin 06/02/2025 0.59     eGFR 06/02/2025 92     hs TnI 0hr 06/02/2025 3     Ventricular Rate 06/02/2025 61     Atrial Rate 06/02/2025 61     NC Interval 06/02/2025 182     QRSD Interval 06/02/2025 112     QT Interval 06/02/2025 428     QTC Interval 06/02/2025 430     P Axis 06/02/2025 40     QRS Axis 06/02/2025 -81     T Wave [place] 06/02/2025 55     Ventricular Rate 06/02/2025 61     Atrial Rate 06/02/2025 61     NC Interval 06/02/2025 214     QRSD Interval 06/02/2025 106     QT Interval 06/02/2025 412     QTC Interval 06/02/2025 414     P Axis 06/02/2025 52     QRS Axis 06/02/2025 -82     T Wave Axis 06/02/2025 69     Ventricular Rate 06/02/2025 61     Atrial Rate 06/02/2025 61     NC Interval 06/02/2025 178     QRSD Interval 06/02/2025 114     QT Interval 06/02/2025 442     QTC Interval 06/02/2025 444     P Axis 06/02/2025 118     QRS Axis 06/02/2025 -80     T Wave [place] 06/02/2025 54      Imaging: No results found.    Review of Systems:  Review of Systems  REVIEW OF SYSTEMS:  Constitutional:  Denies fever or chills   Eyes:  Denies change in visual acuity   HENT:  Denies nasal congestion or sore throat    Respiratory:  Denies cough or shortness of breath   Cardiovascular:  Denies chest pain or edema   GI:  Denies abdominal pain, nausea, vomiting, bloody stools or diarrhea   :  Denies dysuria, frequency, difficulty in micturition and nocturia  Musculoskeletal:  Denies back pain or joint pain   Neurologic: Dementia  Endocrine:  Denies polyuria or polydipsia   Lymphatic:  Denies swollen glands   Psychiatric:  Denies depression or anxiety    Physical Exam:  Physical Exam  PHYSICAL EXAM:  General:  Patient is not in acute distress   Head: Normocephalic, Atraumatic.  HEENT:  Both pupils normal-size atraumatic, normocephalic, nonicteric  Neck:  JVP not raised. Trachea central. No carotid bruit  Respiratory:  normal breath sounds no crackles. no rhonchi  Cardiovascular:  Regular rate and rhythm no S3 no murmurs  GI:  Abdomen soft nontender. No organomegaly.   Lymphatic:  No cervical or inguinal lymphadenopathy  Neurologic: Responds intermittently to verbal commands  Extremities no edema    EKG done on June 2, 2025 in the emergency room showed sinus rhythm left axis deviation incomplete right bundle branch block  Cannot exclude anterior infarct age-indeterminate.    Discussion/Summary:    This patient has history of Alzheimer's dementia and was seen in the emergency room for weakness and tiredness.  Patient has an abnormal EKG.  Patient has no symptoms referable to cardiovascular system.  Patient will be scheduled for an echocardiogram to evaluate ejection fraction and assess for any evidence of wall motion abnormalities given abnormal EKG.    Recent lipid panel done was overall good.    Symptoms to watch out from a cardiac standpoint which would indicate the need for further cardiac evaluation discussed with patient's family.    Overall conservative management otherwise given significant dementia.  Patient will follow-up with primary care physician as well as neurology.  Family is agreeable with the plan of care.        [1]   Patient Active Problem List  Diagnosis    Abnormal brain MRI    Early onset Alzheimer's dementia without behavioral disturbance (HCC)    Ambulatory dysfunction   [2] No past medical history on file.  [3] No family history on file.  [4] No past surgical history on file.  [5]   Current Outpatient Medications:     Cholecalciferol (VITAMIN D-3 PO), Take by mouth in the morning, Disp: , Rfl:     donepezil (ARICEPT) 10 mg tablet, TAKE 1 TABLET BY MOUTH DAILY AT BEDTIME, Disp: 90 tablet, Rfl: 2    LORazepam (ATIVAN) 1 mg tablet, 1Tablet half an hour prior to the MRI, Disp: 1 tablet, Rfl: 0    memantine (NAMENDA) 10 mg tablet, TAKE 1 TABLET BY MOUTH TWICE A DAY, Disp: 180 tablet, Rfl: 0    Multiple Vitamin (multivitamin) capsule, Take 1 capsule by mouth in the morning., Disp: , Rfl:     Omega-3 Fatty Acids (FISH OIL PO), Take by mouth in the morning, Disp: , Rfl:     TURMERIC PO, Take by mouth in the morning, Disp: , Rfl: